# Patient Record
Sex: FEMALE | Race: WHITE | HISPANIC OR LATINO | Employment: UNEMPLOYED | URBAN - METROPOLITAN AREA
[De-identification: names, ages, dates, MRNs, and addresses within clinical notes are randomized per-mention and may not be internally consistent; named-entity substitution may affect disease eponyms.]

---

## 2017-04-24 ENCOUNTER — GENERIC CONVERSION - ENCOUNTER (OUTPATIENT)
Dept: OTHER | Facility: OTHER | Age: 67
End: 2017-04-24

## 2017-04-24 ENCOUNTER — APPOINTMENT (OUTPATIENT)
Dept: LAB | Facility: HOSPITAL | Age: 67
End: 2017-04-24
Payer: MEDICARE

## 2017-04-24 ENCOUNTER — ALLSCRIPTS OFFICE VISIT (OUTPATIENT)
Dept: OTHER | Facility: OTHER | Age: 67
End: 2017-04-24

## 2017-04-24 ENCOUNTER — TRANSCRIBE ORDERS (OUTPATIENT)
Dept: LAB | Facility: HOSPITAL | Age: 67
End: 2017-04-24
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
## 2017-04-24 DIAGNOSIS — R42 DIZZINESS AND GIDDINESS: ICD-10-CM

## 2017-04-24 DIAGNOSIS — D72.828 OTHER ELEVATED WHITE BLOOD CELL COUNT: ICD-10-CM

## 2017-04-24 LAB
ALBUMIN SERPL BCP-MCNC: 3.7 G/DL (ref 3.5–5)
ALP SERPL-CCNC: 118 U/L (ref 46–116)
ALT SERPL W P-5'-P-CCNC: 25 U/L (ref 12–78)
ANION GAP SERPL CALCULATED.3IONS-SCNC: 6 MMOL/L (ref 4–13)
AST SERPL W P-5'-P-CCNC: 14 U/L (ref 5–45)
BASOPHILS # BLD AUTO: 0.03 THOUSANDS/ΜL (ref 0–0.1)
BASOPHILS NFR BLD AUTO: 0 % (ref 0–1)
BILIRUB SERPL-MCNC: 0.67 MG/DL (ref 0.2–1)
BUN SERPL-MCNC: 16 MG/DL (ref 5–25)
CALCIUM SERPL-MCNC: 9.7 MG/DL (ref 8.3–10.1)
CHLORIDE SERPL-SCNC: 104 MMOL/L (ref 100–108)
CO2 SERPL-SCNC: 31 MMOL/L (ref 21–32)
CREAT SERPL-MCNC: 1.26 MG/DL (ref 0.6–1.3)
EOSINOPHIL # BLD AUTO: 0.26 THOUSAND/ΜL (ref 0–0.61)
EOSINOPHIL NFR BLD AUTO: 2 % (ref 0–6)
ERYTHROCYTE [DISTWIDTH] IN BLOOD BY AUTOMATED COUNT: 12.8 % (ref 11.6–15.1)
GFR SERPL CREATININE-BSD FRML MDRD: 42.6 ML/MIN/1.73SQ M
GLUCOSE SERPL-MCNC: 92 MG/DL (ref 65–140)
HBA1C MFR BLD HPLC: 5.4 %
HCT VFR BLD AUTO: 42.1 % (ref 34.8–46.1)
HGB BLD-MCNC: 13.6 G/DL (ref 11.5–15.4)
LYMPHOCYTES # BLD AUTO: 2.63 THOUSANDS/ΜL (ref 0.6–4.47)
LYMPHOCYTES NFR BLD AUTO: 22 % (ref 14–44)
MCH RBC QN AUTO: 30 PG (ref 26.8–34.3)
MCHC RBC AUTO-ENTMCNC: 32.3 G/DL (ref 31.4–37.4)
MCV RBC AUTO: 93 FL (ref 82–98)
MONOCYTES # BLD AUTO: 0.55 THOUSAND/ΜL (ref 0.17–1.22)
MONOCYTES NFR BLD AUTO: 5 % (ref 4–12)
NEUTROPHILS # BLD AUTO: 8.24 THOUSANDS/ΜL (ref 1.85–7.62)
NEUTS SEG NFR BLD AUTO: 71 % (ref 43–75)
NRBC BLD AUTO-RTO: 0 /100 WBCS
PLATELET # BLD AUTO: 295 THOUSANDS/UL (ref 149–390)
PMV BLD AUTO: 12.7 FL (ref 8.9–12.7)
POTASSIUM SERPL-SCNC: 4.3 MMOL/L (ref 3.5–5.3)
PROT SERPL-MCNC: 8.3 G/DL (ref 6.4–8.2)
RBC # BLD AUTO: 4.53 MILLION/UL (ref 3.81–5.12)
SODIUM SERPL-SCNC: 141 MMOL/L (ref 136–145)
WBC # BLD AUTO: 11.75 THOUSAND/UL (ref 4.31–10.16)

## 2017-04-24 PROCEDURE — 36415 COLL VENOUS BLD VENIPUNCTURE: CPT

## 2017-04-24 PROCEDURE — 85025 COMPLETE CBC W/AUTO DIFF WBC: CPT

## 2017-04-24 PROCEDURE — 80053 COMPREHEN METABOLIC PANEL: CPT

## 2017-04-25 ENCOUNTER — GENERIC CONVERSION - ENCOUNTER (OUTPATIENT)
Dept: OTHER | Facility: OTHER | Age: 67
End: 2017-04-25

## 2017-05-30 ENCOUNTER — APPOINTMENT (OUTPATIENT)
Dept: LAB | Facility: CLINIC | Age: 67
End: 2017-05-30
Payer: MEDICARE

## 2017-05-30 DIAGNOSIS — D72.828 OTHER ELEVATED WHITE BLOOD CELL COUNT: ICD-10-CM

## 2017-05-30 LAB
ERYTHROCYTE [DISTWIDTH] IN BLOOD BY AUTOMATED COUNT: 13 % (ref 11.6–15.1)
HCT VFR BLD AUTO: 41.1 % (ref 34.8–46.1)
HGB BLD-MCNC: 12.8 G/DL (ref 11.5–15.4)
MCH RBC QN AUTO: 29.6 PG (ref 26.8–34.3)
MCHC RBC AUTO-ENTMCNC: 31.1 G/DL (ref 31.4–37.4)
MCV RBC AUTO: 95 FL (ref 82–98)
PLATELET # BLD AUTO: 289 THOUSANDS/UL (ref 149–390)
PMV BLD AUTO: 12.6 FL (ref 8.9–12.7)
RBC # BLD AUTO: 4.33 MILLION/UL (ref 3.81–5.12)
WBC # BLD AUTO: 8.43 THOUSAND/UL (ref 4.31–10.16)

## 2017-05-30 PROCEDURE — 36415 COLL VENOUS BLD VENIPUNCTURE: CPT

## 2017-05-30 PROCEDURE — 85027 COMPLETE CBC AUTOMATED: CPT

## 2017-06-02 ENCOUNTER — ALLSCRIPTS OFFICE VISIT (OUTPATIENT)
Dept: OTHER | Facility: OTHER | Age: 67
End: 2017-06-02

## 2017-06-02 DIAGNOSIS — Z78.0 ASYMPTOMATIC MENOPAUSAL STATE: ICD-10-CM

## 2017-06-02 DIAGNOSIS — Z12.31 ENCOUNTER FOR SCREENING MAMMOGRAM FOR MALIGNANT NEOPLASM OF BREAST: ICD-10-CM

## 2018-01-10 NOTE — RESULT NOTES
Verified Results  (1) CBC/PLT/DIFF 24Apr2017 04:59PM Bertrand Skiff Order Number: [id number]     Test Name Result Flag Reference   WBC COUNT 11 75 Thousand/uL H 4 31-10 16   RBC COUNT 4 53 Million/uL  3 81-5 12   HEMOGLOBIN 13 6 g/dL  11 5-15 4   HEMATOCRIT 42 1 %  34 8-46  1   MCV 93 fL  82-98   MCH 30 0 pg  26 8-34 3   MCHC 32 3 g/dL  31 4-37 4   RDW 12 8 %  11 6-15 1   MPV 12 7 fL  8 9-12 7   PLATELET COUNT 484 Thousands/uL  149-390   nRBC AUTOMATED 0 /100 WBCs     NEUTROPHILS RELATIVE PERCENT 71 %  43-75   LYMPHOCYTES RELATIVE PERCENT 22 %  14-44   MONOCYTES RELATIVE PERCENT 5 %  4-12   EOSINOPHILS RELATIVE PERCENT 2 %  0-6   BASOPHILS RELATIVE PERCENT 0 %  0-1   NEUTROPHILS ABSOLUTE COUNT 8 24 Thousands/? ??L H 1 85-7 62   LYMPHOCYTES ABSOLUTE COUNT 2 63 Thousands/? ??L  0 60-4 47   MONOCYTES ABSOLUTE COUNT 0 55 Thousand/? ??L  0 17-1 22   EOSINOPHILS ABSOLUTE COUNT 0 26 Thousand/? ??L  0 00-0 61   BASOPHILS ABSOLUTE COUNT 0 03 Thousands/? ??L  0 00-0 10       Plan  Other elevated white blood cell (WBC) count    · (1) CBC/ PLT (NO DIFF); Status:Active;  Requested for:25Apr2017;

## 2018-01-11 NOTE — PROGRESS NOTES
Chief Complaint  Patient here for A1C testing      Active Problems    1  Acute ischemic stroke (434 91) (I63 9)   2  Asthma (493 90) (J45 909)   3  Chronic migraine (346 70) (G43 709)   4  Diabetes mellitus type 2, noninsulin dependent (250 00) (E11 9)   5  Encounter for cervical Pap smear with pelvic exam (V76 2,V72 31) (Z01 419)   6  Encounter for screening colonoscopy (V76 51) (Z12 11)   7  Encounter for screening mammogram for malignant neoplasm of breast (V76 12)   (Z12 31)   8  Hernia, abdominal (553 9) (K46 9)   9  Hyperlipidemia (272 4) (E78 5)   10  Hypertension (401 9) (I10)   11  Need for pneumococcal vaccine (V03 82) (Z23)    Current Meds   1  Accu-Chek Petra Device; USE AS DIRECTED; Therapy: 21JSN5982 to (Last PW:76ABK6922)  Requested for: 27WVT9480 Ordered   2  Accu-Chek Petra In Vitro Strip; use 1-2 test strips every day for glucose checks; Therapy: 77NPH0322 to (Last Rx:99Ibm6914)  Requested for: 08RLA1509 Ordered   3  Accu-Chek Petra Plus w/Device Kit; Therapy: 43PBQ1435 to Recorded   4  Atorvastatin Calcium 40 MG Oral Tablet; TAKE 1 TABLET DAILY AS DIRECTED; Therapy: 83PBC8993 to (Deo Milligan)  Requested for: 11VMI9307; Last   Rx:13Oct2015 Ordered   5  Clopidogrel Bisulfate 75 MG Oral Tablet; take 1 tablet by mouth once daily; Therapy: 28ZSS7172 to (Evaluate:62Dvt4745)  Requested for: 45Wwj1112; Last   Rx:18Ghx1195 Ordered   6  Folic Acid 1 MG Oral Tablet; TAKE 1 TABLET DAILY; Therapy: 62DTP5664 to (Deo Milligan)  Requested for: 79LFS2812; Last   Rx:13Oct2015 Ordered   7  Lancets Miscellaneous; Use to obtain blood sample twice a day to monitor your blood   sugar; Therapy: 63ITD9846 to (Last Rx:56Nzn9471)  Requested for: 14CYI8231 Ordered   8  Lisinopril 10 MG Oral Tablet; TAKE 1 TABLET DAILY; Therapy: 61YYM7491 to (Chris Cabrales)  Requested for: 16HNW4322; Last   TC:61HHF4202 Ordered   9   MetFORMIN HCl  MG Oral Tablet Extended Release 24 Hour; take 1 tablet by   mouth daily  WITH THE EVENING MEAL; Therapy: 04UXA0972 to (Evaluate:01Jun2017)  Requested for: 97UTS6320; Last   Rx:06Jun2016 Ordered   10  Symbicort 80-4 5 MCG/ACT Inhalation Aerosol; Therapy: 43OLQ6548 to Recorded   11  Ventolin  (90 Base) MCG/ACT Inhalation Aerosol Solution; Therapy: 08WRD7916 to Recorded    Allergies    1   No Known Drug Allergies    Results/Data  Hemoglobin A1c- POC 78GLI1852 04:13PM [facility]     Test Name Result Flag Reference   HEMOGLOBIN A1C 5 5         Plan  Diabetes mellitus type 2, noninsulin dependent    · Hemoglobin A1c- POC; Status:Complete;   Done: 59IWQ4327 04:13PM    Future Appointments    Date/Time Provider Specialty Site   09/22/2016 03:20 PM Yamila Ruiz DO Internal Medicine  [facility] PCP   08/26/2016 01:40 PM Specialty Clinic, [address] [place] [address]     Signatures   Electronically signed by : Sonali Juárez, ; Jul 14 2016  4:08PM EST                       (Author)    Electronically signed by : ROMAINE Lynch ; Jul 14 2016  4:23PM EST                       (Co-author)

## 2018-01-14 VITALS
BODY MASS INDEX: 33.05 KG/M2 | DIASTOLIC BLOOD PRESSURE: 88 MMHG | TEMPERATURE: 98.4 F | WEIGHT: 175.04 LBS | HEART RATE: 78 BPM | HEIGHT: 61 IN | SYSTOLIC BLOOD PRESSURE: 150 MMHG

## 2018-01-22 VITALS
WEIGHT: 179.01 LBS | HEIGHT: 60 IN | DIASTOLIC BLOOD PRESSURE: 82 MMHG | HEART RATE: 82 BPM | SYSTOLIC BLOOD PRESSURE: 140 MMHG | BODY MASS INDEX: 35.15 KG/M2 | TEMPERATURE: 98.7 F

## 2018-01-23 NOTE — MISCELLANEOUS
Reason For Visit  Reason For Visit Free Text Note Form: Assistance with community resources   Case Management Documentation [facility]:   Information obtained from the patient, family member(s) and medical record Miranda Baker [phone]  The patient is receiving food stamps assistance  Other needs and concerns include safety  Patient's financial status inadequate medical insurance  She is also dealing with additional issues such as language/communication barrier  Patient is participating in [facility] [place]  Action Plan: information provided and PA Waiver/ MOWs/HHC if MD moran  plan reviewed  Progress Note  SW met with pt as per Provider request to see this 72 y/o  Bhutanese Speaking female pt who is s/p a fall in her bathtub this date  Pt has suffered a stroke last Fall  She resides with he disabled  is s first floor apt  Pt uses a can and has a walker as well  As per Provider pt is also not eating enough  SW has explained Taty Bruno and PA Waiver Program services as well as MOWs  Pt agrees to all three  Referral for the PA waiver has been faxed in this date  Request fro [employer] also called intis date  When MD order received with also make referral to [facility] as per pt approval and request  Pt also referred to [facility] our Financial Counselor for [employer] after Medicare  Sw to remain available to assist as  indicated  ADDENDUM:  Referral for [facility] received and faxed in on 5/1/17 1         1 Amended By: Lynne Pena; May 01 2017 4:38 PM EST    Active Problems   1  Acute ischemic stroke (434 91) (I63 9)  2  Asthma (493 90) (J45 909)  3  Chronic migraine (346 70) (G43 709)  4  Diabetes mellitus type 2, noninsulin dependent (250 00) (E11 9)  5  Dizziness (780 4) (R42)  6  Encounter for cervical Pap smear with pelvic exam (V76 2,V72 31) (Z01 419)  7  Encounter for screening colonoscopy (V76 51) (Z12 11)  8   Encounter for screening mammogram for malignant neoplasm of breast (V76 12)   (Z12 31)  9  Fall in home, initial encounter (C882 5,Q958 8) ([address]  FFFO,Y00 936)  10  Hernia, abdominal (553 9) (K46 9)  11  Hyperlipidemia (272 4) (E78 5)  12  Hypertension (401 9) (I10)  13  Need for pneumococcal vaccine (V03 82) (Z23)    Current Meds  1  Accu-Chek Petra Device; USE AS DIRECTED; Therapy: 94HLG7575 to (Last TE:59XPT5354)  Requested for: 79DJD0176 Ordered  2  Accu-Chek Petra Plus w/Device Kit; Therapy: 99TXL4566 to Recorded  3  Accu-Chek Petra STRP; use 1-2 test strips every day for glucose checks; Therapy: 48BQW1588 to (Last Rx:65Kze5826)  Requested for: 85KST4594 Ordered  4  Atorvastatin Calcium 40 MG Oral Tablet; TAKE 1 TABLET DAILY AS DIRECTED; Therapy: 42UPA8209 to (Evaluate:68Wfn3577)  Requested for: 88Fac5587; Last   Rx:37Bfl4027 Ordered  5  Clopidogrel Bisulfate 75 MG Oral Tablet (Plavix); take 1 tablet by mouth once daily; Therapy: 37ZSS8231 to (Evaluate:17Mar2017)  Requested for: 05VPF6098; Last   Rx:28Wwq1566 Ordered  6  Folic Acid 1 MG Oral Tablet; TAKE 1 TABLET DAILY; Therapy: 74RUN7010 to (Evaluate:13Oct2017)  Requested for: 45PBX9897; Last   Rx:18Oct2016 Ordered  7  Lancets Miscellaneous; Use to obtain blood sample twice a day to monitor your blood   sugar; Therapy: 43UXT7419 to (Last Rx:34Pnk6173)  Requested for: 27JWO2493 Ordered  8  Lisinopril 10 MG Oral Tablet; TAKE 1 TABLET DAILY; Therapy: 18VIU0165 to (Tacy Heidrick)  Requested for: 99SBK4411; Last   VX:25RMH4880 Ordered  9  MetFORMIN HCl  MG Oral Tablet Extended Release 24 Hour; take 1 tablet by   mouth daily  WITH THE EVENING MEAL; Therapy: 31VSK3147 to (Evaluate:01Jun2017)  Requested for: 29CHA8330; Last   Rx:06Jun2016 Ordered  10  Symbicort 80-4 5 MCG/ACT Inhalation Aerosol; Therapy: 40WOJ0220 to Recorded  11  Ventolin  (90 Base) MCG/ACT Inhalation Aerosol Solution; Therapy: 81DQW4438 to Recorded    Allergies   1   No Known Drug Allergies    Future Appointments    Date/Time Provider Specialty Site   05/10/2017 10:20 AM Rosana Whitten 6 PCP     Signatures   Electronically signed by : Carmen Dutta LCSW; Apr 24 2017  5:21PM EST                       (Author)    Electronically signed by : Carmen Dutta LCSW; May  1 2017  4:39PM EST                       (Author)

## 2018-01-30 RX ORDER — BUTALBITAL, ACETAMINOPHEN AND CAFFEINE 50; 325; 40 MG/1; MG/1; MG/1
1 TABLET ORAL EVERY 4 HOURS
COMMUNITY
Start: 2015-10-17 | End: 2022-01-30 | Stop reason: HOSPADM

## 2018-01-30 RX ORDER — LISINOPRIL 10 MG/1
10 TABLET ORAL
COMMUNITY
Start: 2015-10-17

## 2018-01-30 RX ORDER — ATORVASTATIN CALCIUM 40 MG/1
40 TABLET, FILM COATED ORAL
COMMUNITY
Start: 2015-10-17

## 2018-01-30 RX ORDER — ATORVASTATIN CALCIUM 40 MG/1
40 TABLET, FILM COATED ORAL
Refills: 0 | OUTPATIENT
Start: 2018-01-30

## 2018-01-30 RX ORDER — METFORMIN HYDROCHLORIDE 750 MG/1
750 TABLET, EXTENDED RELEASE ORAL
COMMUNITY

## 2018-01-30 RX ORDER — LANCETS 30 GAUGE
1 EACH MISCELLANEOUS DAILY
COMMUNITY
Start: 2015-07-01

## 2018-01-30 RX ORDER — ATORVASTATIN CALCIUM 40 MG/1
TABLET, FILM COATED ORAL
Qty: 90 TABLET | Refills: 0 | OUTPATIENT
Start: 2018-01-30

## 2018-01-30 RX ORDER — FOLIC ACID 1 MG/1
1 TABLET ORAL
COMMUNITY

## 2018-01-30 RX ORDER — CLOPIDOGREL BISULFATE 75 MG/1
75 TABLET ORAL
COMMUNITY
Start: 2015-10-17 | End: 2018-01-30

## 2018-01-30 RX ORDER — ASPIRIN 325 MG
325 TABLET, DELAYED RELEASE (ENTERIC COATED) ORAL
COMMUNITY
End: 2018-01-30

## 2018-01-30 RX ORDER — BUDESONIDE AND FORMOTEROL FUMARATE DIHYDRATE 80; 4.5 UG/1; UG/1
1 AEROSOL RESPIRATORY (INHALATION) 2 TIMES DAILY
COMMUNITY
Start: 2016-01-08

## 2018-01-30 RX ORDER — ATORVASTATIN CALCIUM 40 MG/1
1 TABLET, FILM COATED ORAL DAILY
COMMUNITY
Start: 2015-06-15 | End: 2018-01-30

## 2018-01-30 RX ORDER — ALBUTEROL SULFATE 90 UG/1
2 AEROSOL, METERED RESPIRATORY (INHALATION) 2 TIMES DAILY
COMMUNITY
Start: 2015-06-15

## 2018-01-30 NOTE — TELEPHONE ENCOUNTER
Please advise patient to make an apt  No med refills  She past due for her six month apt  Thanks   Cyrus Grimaldo

## 2018-01-31 NOTE — TELEPHONE ENCOUNTER
Called pt to schedule appt  She stated she will call back to schedule appt once she coordinate a ride

## 2018-06-04 RX ORDER — METFORMIN HYDROCHLORIDE 750 MG/1
TABLET, EXTENDED RELEASE ORAL
Qty: 90 TABLET | Refills: 3 | OUTPATIENT
Start: 2018-06-04

## 2018-06-27 ENCOUNTER — TELEPHONE (OUTPATIENT)
Dept: INTERNAL MEDICINE CLINIC | Facility: CLINIC | Age: 68
End: 2018-06-27

## 2022-01-29 ENCOUNTER — HOSPITAL ENCOUNTER (OUTPATIENT)
Facility: HOSPITAL | Age: 72
Setting detail: OBSERVATION
Discharge: HOME | End: 2022-01-30
Attending: EMERGENCY MEDICINE | Admitting: PSYCHIATRY & NEUROLOGY
Payer: COMMERCIAL

## 2022-01-29 ENCOUNTER — APPOINTMENT (EMERGENCY)
Dept: RADIOLOGY | Facility: HOSPITAL | Age: 72
End: 2022-01-29
Payer: COMMERCIAL

## 2022-01-29 ENCOUNTER — APPOINTMENT (OUTPATIENT)
Dept: RADIOLOGY | Facility: HOSPITAL | Age: 72
End: 2022-01-29
Payer: COMMERCIAL

## 2022-01-29 DIAGNOSIS — G45.9 TIA (TRANSIENT ISCHEMIC ATTACK): ICD-10-CM

## 2022-01-29 DIAGNOSIS — I63.9 STROKE (HCC): Primary | ICD-10-CM

## 2022-01-29 DIAGNOSIS — I63.9 DYSARTHRIA DUE TO ACUTE STROKE (HCC): ICD-10-CM

## 2022-01-29 DIAGNOSIS — Z86.73 HISTORY OF CVA (CEREBROVASCULAR ACCIDENT): ICD-10-CM

## 2022-01-29 DIAGNOSIS — R47.1 DYSARTHRIA: ICD-10-CM

## 2022-01-29 DIAGNOSIS — R47.1 DYSARTHRIA DUE TO ACUTE STROKE (HCC): ICD-10-CM

## 2022-01-29 PROBLEM — R29.90 STROKE-LIKE SYMPTOMS: Status: ACTIVE | Noted: 2022-01-29

## 2022-01-29 PROBLEM — J45.20 MILD INTERMITTENT ASTHMA WITHOUT COMPLICATION: Status: ACTIVE | Noted: 2017-06-02

## 2022-01-29 PROBLEM — I10 PRIMARY HYPERTENSION: Status: ACTIVE | Noted: 2022-01-29

## 2022-01-29 LAB
ANION GAP SERPL CALCULATED.3IONS-SCNC: 3 MMOL/L (ref 4–13)
APTT PPP: 32 SECONDS (ref 23–37)
BACTERIA UR QL AUTO: ABNORMAL /HPF
BILIRUB UR QL STRIP: NEGATIVE
BUN SERPL-MCNC: 14 MG/DL (ref 5–25)
CALCIUM SERPL-MCNC: 9.1 MG/DL (ref 8.3–10.1)
CARDIAC TROPONIN I PNL SERPL HS: 3 NG/L
CHLORIDE SERPL-SCNC: 108 MMOL/L (ref 100–108)
CLARITY UR: CLEAR
CO2 SERPL-SCNC: 29 MMOL/L (ref 21–32)
COLOR UR: YELLOW
CREAT SERPL-MCNC: 1.19 MG/DL (ref 0.6–1.3)
ERYTHROCYTE [DISTWIDTH] IN BLOOD BY AUTOMATED COUNT: 13.4 % (ref 11.6–15.1)
FLUAV RNA RESP QL NAA+PROBE: NEGATIVE
FLUBV RNA RESP QL NAA+PROBE: NEGATIVE
GFR SERPL CREATININE-BSD FRML MDRD: 46 ML/MIN/1.73SQ M
GLUCOSE SERPL-MCNC: 130 MG/DL (ref 65–140)
GLUCOSE SERPL-MCNC: 196 MG/DL (ref 65–140)
GLUCOSE SERPL-MCNC: 203 MG/DL (ref 65–140)
GLUCOSE UR STRIP-MCNC: NEGATIVE MG/DL
HCT VFR BLD AUTO: 43.1 % (ref 34.8–46.1)
HGB BLD-MCNC: 13 G/DL (ref 11.5–15.4)
HGB UR QL STRIP.AUTO: ABNORMAL
HYALINE CASTS #/AREA URNS LPF: ABNORMAL /LPF
INR PPP: 1.03 (ref 0.84–1.19)
KETONES UR STRIP-MCNC: NEGATIVE MG/DL
LEUKOCYTE ESTERASE UR QL STRIP: NEGATIVE
MCH RBC QN AUTO: 29.6 PG (ref 26.8–34.3)
MCHC RBC AUTO-ENTMCNC: 30.2 G/DL (ref 31.4–37.4)
MCV RBC AUTO: 98 FL (ref 82–98)
NITRITE UR QL STRIP: NEGATIVE
NON-SQ EPI CELLS URNS QL MICRO: ABNORMAL /HPF
PA ADP BLD-ACNC: 268 PRU (ref 194–418)
PH UR STRIP.AUTO: 6 [PH] (ref 4.5–8)
PLATELET # BLD AUTO: 231 THOUSANDS/UL (ref 149–390)
PLATELET # BLD AUTO: 292 THOUSANDS/UL (ref 149–390)
PMV BLD AUTO: 12.5 FL (ref 8.9–12.7)
PMV BLD AUTO: 12.6 FL (ref 8.9–12.7)
POTASSIUM SERPL-SCNC: 4.2 MMOL/L (ref 3.5–5.3)
PROT UR STRIP-MCNC: NEGATIVE MG/DL
PROTHROMBIN TIME: 13.1 SECONDS (ref 11.6–14.5)
RBC # BLD AUTO: 4.39 MILLION/UL (ref 3.81–5.12)
RBC #/AREA URNS AUTO: ABNORMAL /HPF
RSV RNA RESP QL NAA+PROBE: NEGATIVE
SARS-COV-2 RNA RESP QL NAA+PROBE: NEGATIVE
SODIUM SERPL-SCNC: 140 MMOL/L (ref 136–145)
SP GR UR STRIP.AUTO: 1.01 (ref 1–1.03)
UROBILINOGEN UR QL STRIP.AUTO: 0.2 E.U./DL
WBC # BLD AUTO: 10.88 THOUSAND/UL (ref 4.31–10.16)
WBC #/AREA URNS AUTO: ABNORMAL /HPF

## 2022-01-29 PROCEDURE — 82948 REAGENT STRIP/BLOOD GLUCOSE: CPT

## 2022-01-29 PROCEDURE — 85730 THROMBOPLASTIN TIME PARTIAL: CPT

## 2022-01-29 PROCEDURE — 85049 AUTOMATED PLATELET COUNT: CPT | Performed by: PSYCHIATRY & NEUROLOGY

## 2022-01-29 PROCEDURE — 0241U HB NFCT DS VIR RESP RNA 4 TRGT: CPT

## 2022-01-29 PROCEDURE — 36415 COLL VENOUS BLD VENIPUNCTURE: CPT

## 2022-01-29 PROCEDURE — 84484 ASSAY OF TROPONIN QUANT: CPT

## 2022-01-29 PROCEDURE — 85576 BLOOD PLATELET AGGREGATION: CPT | Performed by: PSYCHIATRY & NEUROLOGY

## 2022-01-29 PROCEDURE — 93005 ELECTROCARDIOGRAM TRACING: CPT

## 2022-01-29 PROCEDURE — G1004 CDSM NDSC: HCPCS

## 2022-01-29 PROCEDURE — 70498 CT ANGIOGRAPHY NECK: CPT

## 2022-01-29 PROCEDURE — 99223 1ST HOSP IP/OBS HIGH 75: CPT | Performed by: PSYCHIATRY & NEUROLOGY

## 2022-01-29 PROCEDURE — NC001 PR NO CHARGE: Performed by: PSYCHIATRY & NEUROLOGY

## 2022-01-29 PROCEDURE — 85027 COMPLETE CBC AUTOMATED: CPT

## 2022-01-29 PROCEDURE — 70496 CT ANGIOGRAPHY HEAD: CPT

## 2022-01-29 PROCEDURE — 80048 BASIC METABOLIC PNL TOTAL CA: CPT

## 2022-01-29 PROCEDURE — 85610 PROTHROMBIN TIME: CPT

## 2022-01-29 PROCEDURE — 99285 EMERGENCY DEPT VISIT HI MDM: CPT

## 2022-01-29 PROCEDURE — 99285 EMERGENCY DEPT VISIT HI MDM: CPT | Performed by: EMERGENCY MEDICINE

## 2022-01-29 PROCEDURE — 81001 URINALYSIS AUTO W/SCOPE: CPT

## 2022-01-29 PROCEDURE — 87086 URINE CULTURE/COLONY COUNT: CPT

## 2022-01-29 PROCEDURE — 70551 MRI BRAIN STEM W/O DYE: CPT

## 2022-01-29 PROCEDURE — 71045 X-RAY EXAM CHEST 1 VIEW: CPT

## 2022-01-29 RX ORDER — LISINOPRIL 10 MG/1
10 TABLET ORAL DAILY
Status: DISCONTINUED | OUTPATIENT
Start: 2022-01-30 | End: 2022-01-29

## 2022-01-29 RX ORDER — METFORMIN HYDROCHLORIDE 750 MG/1
750 TABLET, EXTENDED RELEASE ORAL
Status: DISCONTINUED | OUTPATIENT
Start: 2022-01-30 | End: 2022-01-29

## 2022-01-29 RX ORDER — ATORVASTATIN CALCIUM 40 MG/1
40 TABLET, FILM COATED ORAL EVERY EVENING
Status: DISCONTINUED | OUTPATIENT
Start: 2022-01-30 | End: 2022-01-30 | Stop reason: HOSPADM

## 2022-01-29 RX ORDER — FOLIC ACID 1 MG/1
1 TABLET ORAL DAILY
Status: DISCONTINUED | OUTPATIENT
Start: 2022-01-30 | End: 2022-01-30 | Stop reason: HOSPADM

## 2022-01-29 RX ORDER — BUDESONIDE AND FORMOTEROL FUMARATE DIHYDRATE 80; 4.5 UG/1; UG/1
1 AEROSOL RESPIRATORY (INHALATION) 2 TIMES DAILY
Status: DISCONTINUED | OUTPATIENT
Start: 2022-01-29 | End: 2022-01-30 | Stop reason: HOSPADM

## 2022-01-29 RX ORDER — ATORVASTATIN CALCIUM 20 MG/1
40 TABLET, FILM COATED ORAL EVERY EVENING
Status: DISCONTINUED | OUTPATIENT
Start: 2022-01-29 | End: 2022-01-29

## 2022-01-29 RX ORDER — ALBUTEROL SULFATE 90 UG/1
2 AEROSOL, METERED RESPIRATORY (INHALATION) EVERY 6 HOURS PRN
Status: DISCONTINUED | OUTPATIENT
Start: 2022-01-29 | End: 2022-01-30 | Stop reason: HOSPADM

## 2022-01-29 RX ORDER — ATORVASTATIN CALCIUM 20 MG/1
80 TABLET, FILM COATED ORAL EVERY EVENING
Status: DISCONTINUED | OUTPATIENT
Start: 2022-01-30 | End: 2022-01-29

## 2022-01-29 RX ORDER — ASPIRIN 81 MG/1
81 TABLET, CHEWABLE ORAL DAILY
Status: DISCONTINUED | OUTPATIENT
Start: 2022-01-30 | End: 2022-01-30 | Stop reason: HOSPADM

## 2022-01-29 RX ADMIN — IOHEXOL 85 ML: 350 INJECTION, SOLUTION INTRAVENOUS at 14:32

## 2022-01-29 RX ADMIN — TICAGRELOR 180 MG: 90 TABLET ORAL at 17:22

## 2022-01-29 RX ADMIN — BUDESONIDE AND FORMOTEROL FUMARATE DIHYDRATE 1 PUFF: 80; 4.5 AEROSOL RESPIRATORY (INHALATION) at 23:48

## 2022-01-29 NOTE — CONSULTS
[address] 80 y o  female MRN: [id number]  Unit/Bed#: ED 03 Encounter: [id number]    Code Status: Level 3 - DNAR and DNI  POA:      Reason for Admission / Principal Problem: stroke symptoms; workup  Reason for Consult: management of underlying medical problem     Impression:  Patient Active Problem List   Diagnosis    History of CVA (cerebrovascular accident)    Left-sided weakness    Mild intermittent asthma without complication    Morbid obesity [facility] [address] )    Vertigo    Diabetes mellitus type 2, noninsulin dependent ([facility] [address] )    Chronic migraine    Primary hypertension    Stroke-like symptoms       A/P:    Stroke like symptoms  DDX include: Ischemic infarct vs TIA vs recrudescence of prior CVA  Hx of prior CVA with residual left sided upper and lower extremity weakness 4/5 strength   Compliant with home ASA and plavix daily at home   -not tPA candidate; NIH score 3  -admitted to Neurology primary service with Medicine consult  -CT w o contrast and CTA H/N negative   Plavix switched to Brilinta; give 1x load 180mg and continued with 90mg BID;  will need price check for this   -cont ASA 81 mg daily  -increased atorvastatin from 40mg to 80mg daily; follow up lipid panel  -obtain EKG   -Echo  -tele  -Brain MRI  -Neuro Checks  -STAT CT with change in neurologic exam/ mental status   -SBP goal: 160-180; allow for permissive HTN  -speech eval  -PT/OT  -Fall precautions; uses cane to ambulate    Essential HTN  Home meds: Lisinopril 10mg daily  BP on presentation ranging -180  Permit permissive HTN with -180  Holding lisinopril to allow for this   Can resume after 24hrs    HLD  Home atorva 40mg daily increased to 80mg daily   Check lipid panel     T2 Diabetes Mellitus; non-insulin dependent   Home Metformin held while in pt  Check A1c  Consider correctional SSI if hyperglycemia occurs    Morbid Obesity  Follow up OP PCP  Wt loss encouraged w life style modifications    Mild intermittent Asthma  Not in acute exacerbation   Cont home inhalers: Symbicort and albuterol PRN     DVT PPX:  Lovenox 40mg SC daily       HPI: Tyree Henning is a 80 y o  female w sig PMH of prior CVA with residual weakness 4/5 in  LUE/LLE (compliant with daily ASA and plavix ), HTN, HLD, non-insulin dependent T2 DM, migraines, vertigo, obesity who presents to [facility] ED with dysarthria and mild aphasia which started this morning on 1/29/22 at 9am  Last well known was last night prior to bed at 9pm  Pt's  noted pt to have dysarthria on awakening  Pt states her only symptoms included numbness and tingling in left side of face and more pronounce in left jaw/ face area  Denies any weakness/ numbness or tingling in any other body part  Denies falls  Denies HA, fevers, chills, SOB, CP, palpitations, visual disturbance, gait instability  Pt admitted to Neurology service  Internal medicine consulted for assistance of management of underlying chronic medical conditions  History obtained from pt and pt's daughter at bedside who helped with Tunisian translation  At time of examine, pt resting in bed comfortably  NAD  Admits to numbness/ tingling in left side of face  Daughter states that pt still has some slurred speech  On exam, pt has LUE/LLE weakness 4/5 strength which is pt's baseline  Pt is vaccinated for COVID-19 X2  Still needs booster  Denies any known allergies  Level 3 DNR DNI Code status  PMH:  Past Medical History:   Diagnosis Date    Diabetes mellitus ([facility] [address] )     Hypertension     Stroke ([facility] [address] )         PSH:  History reviewed  No pertinent surgical history  Allergies: No Known Allergies    Family History: History reviewed  No pertinent family history      Social History:   Social History     Tobacco Use   Smoking Status Never Smoker   Smokeless Tobacco Not on file Social History     Substance and Sexual Activity   Alcohol Use No      Social History     Substance and Sexual Activity   Drug Use No        Home Medications:   Prior to Admission medications    Medication Sig Start Date End Date Taking? Authorizing Provider   albuterol (VENTOLIN HFA) 90 mcg/act inhaler Inhale 2 puffs 2 (two) times a day 6/15/15   Historical Provider, MD   aspirin 325 mg tablet Take 325 mg by mouth    Historical Provider, MD   atorvastatin (LIPITOR) 40 mg tablet Take 40 mg by mouth 10/17/15   Historical Provider, MD   Blood Glucose Monitoring Suppl (ACCU-CHEK YOUSUF CONNECT) w/Device KIT 1 Device by Does not apply route daily 7/1/15   Historical Provider, MD   budesonide-formoterol (SYMBICORT) 80-4 5 MCG/ACT inhaler Inhale 1 puff 2 (two) times a day 1/8/16   Historical Provider, MD alarconbital-acetaminophen-caffeine (ESGIC PLUS) -40 MG per tablet Take 1 tablet by mouth every 4 (four) hours as needed for headaches 11/10/16   Jennifer Lockett MD   butalbital-acetaminophen-caffeine (FIORICET,ESGIC) -40 mg per tablet Take 1 tablet by mouth every 4 (four) hours 10/17/15   Historical Provider, MD   butalbital-acetaminophen-caffeine-codeine (FIORICET WITH CODEINE) -69-30 MG per capsule Take 1 capsule by mouth every 4 (four) hours as needed for headaches    Historical Provider, MD   clopidogrel (PLAVIX) 75 mg tablet Take 75 mg by mouth daily    Historical Provider, MD   folic acid (FOLVITE) 1 mg tablet Take 1 mg by mouth    Historical Provider, MD   glucose blood (ACCU-CHEK YOUSUF PLUS) test strip 1 strip by In Vitro route daily 7/1/15   Historical Provider, MD   Lancets MISC 1 each by Does not apply route daily 7/1/15   Historical Provider, MD   lisinopril (ZESTRIL) 10 mg tablet Take 10 mg by mouth 10/17/15   Historical Provider, MD   metFORMIN (GLUCOPHAGE-XR) 750 mg 24 hr tablet Take 750 mg by mouth    Historical Provider, MD       ROS:   Review of Systems   Constitutional: Negative  Negative for activity change, chills and fever  HENT: Negative  Eyes: Negative  Negative for visual disturbance  Respiratory: Negative  Cardiovascular: Negative  Gastrointestinal: Negative  Genitourinary: Negative  Musculoskeletal: Negative  Neurological: Positive for speech difficulty, weakness and numbness (left side of face)  Negative for dizziness, seizures, syncope, light-headedness and headaches  Dysarthria  Mild aphasia   Psychiatric/Behavioral: Negative  All other systems reviewed and are negative  Vitals:   Vitals:    01/29/22 1445 01/29/22 1500 01/29/22 1530 01/29/22 1600   BP: 139/62 141/61 150/70 160/68   Pulse: 84 86 76 92   Resp: 18 18 18 18   Temp:       SpO2: 97% 97% 98% 99%     Temp  Min: 97 9 °F (36 6 °C)  Max: 97 9 °F (36 6 °C)     There is no height or weight on file to calculate BMI  PHYSICAL EXAM:  Physical Exam  Vitals and nursing note reviewed  Constitutional:       General: She is not in acute distress  Appearance: Normal appearance  She is obese  She is not ill-appearing  HENT:      Head: Normocephalic and atraumatic  Eyes:      General: No scleral icterus  Conjunctiva/sclera: Conjunctivae normal    Cardiovascular:      Rate and Rhythm: Normal rate and regular rhythm  Pulses: Normal pulses  Heart sounds: Normal heart sounds  No murmur heard  Pulmonary:      Effort: Pulmonary effort is normal  No respiratory distress  Breath sounds: Normal breath sounds  Abdominal:      General: Bowel sounds are normal  There is no distension  Palpations: Abdomen is soft  Tenderness: There is no abdominal tenderness  Musculoskeletal:         General: No swelling or tenderness  Normal range of motion  Cervical back: Normal range of motion and neck supple  Skin:     General: Skin is warm and dry  Capillary Refill: Capillary refill takes less than 2 seconds     Neurological:      Mental Status: She is alert and oriented to person, place, and time  Mental status is at baseline  Sensory: No sensory deficit  Motor: Weakness present  Comments: 4/5 LUE/LLE strength     Psychiatric:         Mood and Affect: Mood normal          Behavior: Behavior normal          Thought Content: Thought content normal          Judgment: Judgment normal          Labs:   Results from last 7 days   Lab Units 01/29/22  1428   WBC Thousand/uL 10 88*   HEMOGLOBIN g/dL 13 0   HEMATOCRIT % 43 1   PLATELETS Thousands/uL 292     Results from last 7 days   Lab Units 01/29/22  1428   POTASSIUM mmol/L 4 2   CHLORIDE mmol/L 108   CO2 mmol/L 29   BUN mg/dL 14   CREATININE mg/dL 1 19   CALCIUM mg/dL 9 1         No results found for: PHOS   Results from last 7 days   Lab Units 01/29/22  1428   INR  1 03   PTT seconds 32     No results found for: TROPONINT  ABG:No results found for: PHART, PFM2KON, PO2ART, QOE4CLE, Y5PEJNCE, BEART, SOURCE    Imaging: I have personally reviewed pertinent reports  EKG: This was personally reviewed by myself     Micro: reviewed chart  Blood Culture: No results found for: BLOODCX  Urine Culture: No results found for: URINECX  Sputum Culture: No components found for: SPUTUMCX  Wound Culure: No results found for: WOUNDCULT    VTE Pharmacologic Prophylaxis: Enoxaparin (Lovenox)  VTE Mechanical Prophylaxis: sequential compression device    Fina John MD  1/29/2022 6:52 PM

## 2022-01-29 NOTE — H&P
History and Pyhsical- Stroke   [facility] 80 y o  female MRN: [id number]  Unit/Bed#: ED 03 Encounter: [id number]      Assessment/Plan     * Dysarthria due to acute stroke [facility])  Assessment & Plan  Assessment:  80 y o  right handed female with history of HTN, DM, and multiple prior strokes with residual left sided weakness and numbness and presumed right-sided visual deficit (on chronic DAPT with aspirin and plavix), who presented as a stroke on 1/29/2022 with dysarthria that was first noted when she woke up around 9am, but she did not present to the ED until 2:20pm and was therefore not a candidate for tPA  She was also not a candidate for tPA due to her symptoms being mild and significantly improved since they began  Etiology of dysarthria is uncertain, with the differential including TIA/stroke (not localizable), or recrudescence of prior stroke symptoms, which pt's daughter confirmed pt has had previously  Workup:  CT head wo 1/29/2022:  No acute findings  Numerous chronic strokes  CTA head/neck 1/29/2022: No acute findings, no LVO or critical stenosis, scattered atherosclerosis of varying severity seen throughout the intracranial vessels, including ACAs and MCAs  Plan:  - admit to neurology service with SOD consulting  - stroke pathway  - continue pt's home aspirin 81mg daily  - check P2Y12 and then load with Brilinta 180mg followed by 90mg BID  If P2Y12 is low, could consider changing to pletal instead  - continue home lipitor 40mg daily (consider increasing if LDL >70)  - MRI brain wo, echo, lipid panel, HbA1c  - permissive HTN to 220/110 for 24 hours, monitor on telemetry  - normothermia, euglycemia  - avoid hypotonic fluids  - frequent neurochecks per stroke pathway protocol   Contact neurology and obtain STAT CT head wo and consider CTA head/neck for significant change in exam          History of CVA (cerebrovascular accident)  Assessment & Plan  Pt had 3 strokes in 2015 with residual left-sided weakness and numbness, and right inferior quadrantanopsia  She has been on DAPT with aspirin and plavix ever since  - see above for ongoing plan for stroke prevention    Primary hypertension  Assessment & Plan  - permissive HTN for 24 hours  Holding home lisinopril for now  Diabetes mellitus type 2, noninsulin dependent ([address] )  Assessment & Plan  History of DM2 on metformin   - hold metformin while inpatient and because she received IV contrast  - Fingersticks and sliding scale insulin TIDAC    Recent Labs     01/29/22  1440   POCGLU 196*       Blood Sugar Average: Last 72 hrs:  (P) 196    Mild intermittent asthma without complication  Assessment & Plan  - Symbicort BID  - Albuterol inhaler PRN      TPA Decision: Patient not a TPA candidate  Unclear time of onset outside appropriate time window  and Symptoms resolved/clearly non disabling  Recommendations for outpatient neurological follow up have yet to be determined  History of Present Illness     Reason for Consult / Principal Problem: dysarthria  Hx and PE limited by: English not being the patient's primary language, but she is able to speak English fairly well and her daughter was able to help as well  Patient last known well: last night before bed  Stroke alert called: 2:21pm  Neurology time of arrival: 2:23pm  HPI: Victorino Portillo is a 80 y o  right handed female with history of HTN, DM, and multiple prior strokes (3 known acute strokes in 2015 per available records) with residual left sided weakness and numbness and presumed right-sided visual deficit (on chronic DAPT with aspirin and plavix), who presented as a stroke alert with dysarthria that was first noted when she woke up this morning  Last known normal the night prior before going to bed    Per pt's daughter, pt's  reported that pt was having slurred speech when she first woke up around 9am   He apparently tried to get her to go to the hospital to be evaluated by she refused  Eventually he called their daughter, who when she heard pt's speech, immediately determined she needed to go to the hospital   She did note that pt had more difficulty walking than usual on the way to the car  When the patient was being evaluated in the ED, pt's daughter reported that pt's speech was actually much improved from earlier when she had first heard it, but that is was still slurred, and sounded like a child having trouble annunciating properly when learning how to speak  Review of Systems: As above  10 point ROS completed, and otherwise unremarkable and not pertinent to the HPI  Historical Information   Past Medical History:   Diagnosis Date    Diabetes mellitus ([facility] [address] )     Hypertension     Stroke [facility])      Past Surgical History:   Procedure Laterality Date     SECTION      x2     Social History   Social History     Substance and Sexual Activity   Alcohol Use No     Social History     Substance and Sexual Activity   Drug Use No     E-Cigarette/Vaping     E-Cigarette/Vaping Substances     Social History     Tobacco Use   Smoking Status Never Smoker   Smokeless Tobacco Not on file     Family History:   Family History   Problem Relation Age of Onset    Stroke Father        Review of previous medical records was completed      Meds/Allergies   all current active meds have been reviewed, current meds:   Current Facility-Administered Medications   Medication Dose Route Frequency    albuterol (PROVENTIL HFA,VENTOLIN HFA) inhaler 2 puff  2 puff Inhalation Q6H PRN    [START ON 2022] aspirin chewable tablet 81 mg  81 mg Oral Daily    [START ON 2022] atorvastatin (LIPITOR) tablet 80 mg  80 mg Oral QPM    budesonide-formoterol (SYMBICORT) 80-4 5 MCG/ACT inhaler 1 puff  1 puff Inhalation BID    [START ON 2022] enoxaparin (LOVENOX) subcutaneous injection 40 mg  40 mg Subcutaneous Daily    [START ON ] folic acid (FOLVITE) tablet 1 mg  1 mg Oral Daily  [START ON 2022] ticagrelor (BRILINTA) tablet 90 mg  90 mg Oral Q12H [address]    and PTA meds:   Prior to Admission Medications   Prescriptions Last Dose Informant Patient Reported? Taking? Blood Glucose Monitoring Suppl (ACCU-CHEK YOUSUF CONNECT) w/Device KIT   Yes No   Si Device by Does not apply route daily   Lancets MISC   Yes No   Si each by Does not apply route daily   albuterol (VENTOLIN HFA) 90 mcg/act inhaler   Yes No   Sig: Inhale 2 puffs 2 (two) times a day   aspirin 325 mg tablet   Yes No   Sig: Take 325 mg by mouth   atorvastatin (LIPITOR) 40 mg tablet   Yes No   Sig: Take 40 mg by mouth   budesonide-formoterol (SYMBICORT) 80-4 5 MCG/ACT inhaler   Yes No   Sig: Inhale 1 puff 2 (two) times a day   butalbital-acetaminophen-caffeine (ESGIC PLUS) -40 MG per tablet   No No   Sig: Take 1 tablet by mouth every 4 (four) hours as needed for headaches   butalbital-acetaminophen-caffeine (FIORICET,ESGIC) -40 mg per tablet   Yes No   Sig: Take 1 tablet by mouth every 4 (four) hours   butalbital-acetaminophen-caffeine-codeine (FIORICET WITH CODEINE) -66-30 MG per capsule   Yes No   Sig: Take 1 capsule by mouth every 4 (four) hours as needed for headaches   clopidogrel (PLAVIX) 75 mg tablet   Yes No   Sig: Take 75 mg by mouth daily   folic acid (FOLVITE) 1 mg tablet   Yes No   Sig: Take 1 mg by mouth   glucose blood (ACCU-CHEK YOUSUF PLUS) test strip   Yes No   Si strip by In Vitro route daily   lisinopril (ZESTRIL) 10 mg tablet   Yes No   Sig: Take 10 mg by mouth   metFORMIN (GLUCOPHAGE-XR) 750 mg 24 hr tablet   Yes No   Sig: Take 750 mg by mouth      Facility-Administered Medications: None       No Known Allergies    Objective   Vitals:Blood pressure 160/68, pulse 92, temperature 97 9 °F (36 6 °C), resp  rate 18, SpO2 99 %  ,There is no height or weight on file to calculate BMI  No intake or output data in the 24 hours ending 22 7490    Invasive Devices:    Invasive Devices Report    Peripheral Intravenous Line            Peripheral IV 01/29/22 Left Antecubital <1 day              GENERAL PHYSICAL EXAMINATION   Constitutional: No acute distress  Pleasant, well-groomed  ENMT: Mucous membranes are moist    Cardiovascular: regular rate and rhythm  No edema  Pulmonary: normal respiratory effort  Musculoskeletal: See below  Skin: warm and dry  Psych: See mental status below     NEUROLOGICAL EXAMINATION   Mental Status: Mood and affect normal, alert and oriented to person, place and time  Able to provide some history  Language: Spontaneous & fluent with good comprehension, able to repeat phrases, name objects, and follow complex commands  Cranial Nerves:   II, III: PERRL, right inferior homonymous quadrantinopsia  III, IV, VI: Extraocular movements intact, no nystagmus, no ptosis  V: Sensation to light touch decrease on the left in V1-V3   VII: Symmetrical face and expression  No weakness of orbicularis oculi  VIII: Hearing intact to voice  IX, X: Uvula midline, palate elevates symmetrically, mild dysarthria  XI: 5/5 strength in trapezius & sternocleidomastoid bilaterally  XII: Tongue is midline with symmetric movement  Motor examination: There is no atrophy and tone is mildly increased on the left  Strength 5/5 throughout in the right, 4-/5 left deltoid, 4+/5 left biceps/triceps, 4+/5 left , 4+/5 left hip flexion, 4+/5 left knee flex/ext, 3/5 left dorsiflexion, 5/5 left plantar flexion  No pronator drift  No fasciculations or spontaneous muscle movements are noted  Sensory examination: Sensation is decreased to pinprick throughout on the left  Reflexes: Reflexes are 2+ B/L UEs, 0 right patella, 2+ left patella, 0 B/L ankles  Toes downgoing on the right, mute on the left  Coordination: Finger to nose and heel to shin are intact without dysmetria  There is no tremor noted  Gait: Deferred due to acuity of the situation        NIHSS:  1a Level of Consciousness: 0 = Alert   1b  LOC Questions: 0 = Answers both correctly   1c  LOC Commands: 0 = Obeys both correctly   2  Best Gaze: 0 = Normal   3  Visual: 1 = Partial hemianopia   4  Facial Palsy: 0=Normal symmetric movement   5a  Motor Right Arm: 0=No drift, limb holds 90 (or 45) degrees for full 10 seconds   5b  Motor Left Arm: 0=No drift, limb holds 90 (or 45) degrees for full 10 seconds   6a  Motor Right Le=No drift, limb holds 90 (or 45) degrees for full 10 seconds   6b  Motor Left Le=No drift, limb holds 90 (or 45) degrees for full 10 seconds   7  Limb Ataxia:  0=Absent   8  Sensory: 1=Mild to moderate sensory loss; patient feels pinprick is less sharp or is dull on the affected side; there is a loss of superficial pain with pinprick but patient is aware She is being touched   9  Best Language:  0=No aphasia, normal   10  Dysarthria: 1=Mild to moderate, patient slurs at least some words and at worst, can be understood with some difficulty   11  Extinction and Inattention (formerly Neglect): 0=No abnormality   Total Score: 3 (only 1 point for new deficits)     Time NIHSS was completed: 2:23pm    Modified Keith Score: 1-2    Lab Results:   I have personally reviewed pertinent reports    CBC:   Results from last 7 days   Lab Units 22  1722 22  1428   WBC Thousand/uL  --  10 88*   RBC Million/uL  --  4 39   HEMOGLOBIN g/dL  --  13 0   HEMATOCRIT %  --  43 1   MCV fL  --  98   PLATELETS Thousands/uL 231 292   BMP/CMP:   Results from last 7 days   Lab Units 22  1428   SODIUM mmol/L 140   POTASSIUM mmol/L 4 2   CHLORIDE mmol/L 108   CO2 mmol/L 29   BUN mg/dL 14   CREATININE mg/dL 1 19   CALCIUM mg/dL 9 1   EGFR ml/min/1 73sq m 46   , HgBA1C:   , TSH:   , Coagulation:   Results from last 7 days   Lab Units 22  1428   INR  1 03   , Lipid Profile:   , Urinalysis:   Results from last 7 days   Lab Units 22  1556   COLOR UA  Yellow   CLARITY UA  Clear   SPEC GRAV UA  1 010   PH UA 6  0   LEUKOCYTES UA  Negative   NITRITE UA  Negative   GLUCOSE UA mg/dl Negative   KETONES UA mg/dl Negative   BILIRUBIN UA  Negative   BLOOD UA  Trace*     Imaging Studies: I have personally reviewed pertinent reports  and I have personally reviewed pertinent films in PACS  VTE Prophylaxis: Sequential compression device (Venodyne)  and Enoxaparin (Lovenox)    Code Status: Level 3 - DNAR and DNI  Advance Directive and Living Will:       Power of :    POLST:      Counseling / Coordination of Care  Total Critical Care time spent 52 minutes excluding procedures, teaching and family updates

## 2022-01-29 NOTE — ED PROVIDER NOTES
History  Chief Complaint   Patient presents with   [address]    Slurred Speech     Patient is a 81 y/o F with PMH CVA in 2015 with residual left arm and left leg weakness, DM presenting with dysarthria  Pt Dominican speaking, daughter here translating  Daughter states patient with dysarthria first noticed around 9AM this morning on her initial contact with her mother for the day  Daughter spoke with patient before bed last night and she had normal speech  Also concerned for patient speaking "gibberish" and not making sense  Daughter notes she has some left arm and left leg weakness from her prior stroke that is not worsened today  She is on aspirin and plavix daily and notes compliance  Denies recent illness, fevers, chills, CP, SOB, N/V/D, new weakness  Prior to Admission Medications   Prescriptions Last Dose Informant Patient Reported? Taking?    Blood Glucose Monitoring Suppl (ACCU-CHEK YOUSUF CONNECT) w/Device KIT   Yes No   Si Device by Does not apply route daily   Lancets MISC   Yes No   Si each by Does not apply route daily   albuterol (VENTOLIN HFA) 90 mcg/act inhaler   Yes No   Sig: Inhale 2 puffs 2 (two) times a day   aspirin 325 mg tablet   Yes No   Sig: Take 325 mg by mouth   atorvastatin (LIPITOR) 40 mg tablet   Yes No   Sig: Take 40 mg by mouth   budesonide-formoterol (SYMBICORT) 80-4 5 MCG/ACT inhaler   Yes No   Sig: Inhale 1 puff 2 (two) times a day   butalbital-acetaminophen-caffeine (ESGIC PLUS) -40 MG per tablet   No No   Sig: Take 1 tablet by mouth every 4 (four) hours as needed for headaches   butalbital-acetaminophen-caffeine (FIORICET,ESGIC) -40 mg per tablet   Yes No   Sig: Take 1 tablet by mouth every 4 (four) hours   butalbital-acetaminophen-caffeine-codeine (FIORICET WITH CODEINE) -12-30 MG per capsule   Yes No   Sig: Take 1 capsule by mouth every 4 (four) hours as needed for headaches   clopidogrel (PLAVIX) 75 mg tablet   Yes No   Sig: Take 75 mg by mouth daily   folic acid (FOLVITE) 1 mg tablet   Yes No   Sig: Take 1 mg by mouth   glucose blood (ACCU-CHEK YOUSUF PLUS) test strip   Yes No   Si strip by In Vitro route daily   lisinopril (ZESTRIL) 10 mg tablet   Yes No   Sig: Take 10 mg by mouth   metFORMIN (GLUCOPHAGE-XR) 750 mg 24 hr tablet   Yes No   Sig: Take 750 mg by mouth      Facility-Administered Medications: None       Past Medical History:   Diagnosis Date    Diabetes mellitus ([facility] [address] )     Hypertension     Stroke [facility])        Past Surgical History:   Procedure Laterality Date     SECTION      x2       Family History   Problem Relation Age of Onset    Stroke Father      I have reviewed and agree with the history as documented  E-Cigarette/Vaping     E-Cigarette/Vaping Substances     Social History     Tobacco Use    Smoking status: Never Smoker    Smokeless tobacco: Not on file   Substance Use Topics    Alcohol use: No    Drug use: No        Review of Systems   Constitutional: Negative for chills and fever  HENT: Negative for ear pain and sore throat  Eyes: Negative for pain and visual disturbance  Respiratory: Negative for cough and shortness of breath  Cardiovascular: Negative for chest pain and palpitations  Gastrointestinal: Negative for abdominal pain and vomiting  Genitourinary: Negative for dysuria and hematuria  Musculoskeletal: Negative for arthralgias and back pain  Skin: Negative for color change and rash  Neurological: Positive for weakness  Negative for seizures, syncope, numbness and headaches  All other systems reviewed and are negative        Physical Exam  ED Triage Vitals   Temperature Pulse Respirations Blood Pressure SpO2   22 1422 22 1422 22 1422 22 1422 22 1422   97 9 °F (36 6 °C) 102 18 (!) 174/79 98 %      Temp src Heart Rate Source Patient Position - Orthostatic VS BP Location FiO2 (%)   -- 22 1430 22 1900 -- --    Monitor Lying        Pain Score --                    Orthostatic Vital Signs  Vitals:    01/29/22 1600 01/29/22 1829 01/29/22 1900 01/29/22 2000   BP: 160/68 155/75 170/75 (!) 177/74   Pulse: 92 90 74 78   Patient Position - Orthostatic VS:   Lying Lying       Physical Exam  Vitals and nursing note reviewed  Constitutional:       General: She is not in acute distress  Appearance: She is not toxic-appearing  HENT:      Head: Normocephalic and atraumatic  Right Ear: External ear normal       Left Ear: External ear normal       Nose: Nose normal       Mouth/Throat:      Pharynx: Oropharynx is clear  No oropharyngeal exudate or posterior oropharyngeal erythema  Eyes:      Extraocular Movements: Extraocular movements intact  Pupils: Pupils are equal, round, and reactive to light  Cardiovascular:      Rate and Rhythm: Normal rate and regular rhythm  Pulses: Normal pulses  Heart sounds: Normal heart sounds  No murmur heard  No friction rub  No gallop  Pulmonary:      Effort: Pulmonary effort is normal  No respiratory distress  Breath sounds: Normal breath sounds  No wheezing, rhonchi or rales  Abdominal:      General: Abdomen is flat  There is no distension  Palpations: Abdomen is soft  Tenderness: There is no abdominal tenderness  There is no guarding or rebound  Musculoskeletal:         General: No deformity  Normal range of motion  Cervical back: Normal range of motion  No rigidity  Right lower leg: No edema  Left lower leg: No edema  Skin:     General: Skin is warm and dry  Capillary Refill: Capillary refill takes less than 2 seconds  Findings: No rash  Neurological:      Mental Status: She is alert and oriented to person, place, and time  Cranial Nerves: No cranial nerve deficit  Sensory: No sensory deficit  Motor: Weakness present  Comments: No CN deficit  No pronator drift  4/5 strength left UE and LLE   Mild ataxia in left UE, unclear if 2/2 weakness  Pt with dysarthria w/o aphasia  Sensation intact  Psychiatric:         Mood and Affect: Mood normal          ED Medications  Medications   albuterol (PROVENTIL HFA,VENTOLIN HFA) inhaler 2 puff (has no administration in time range)   budesonide-formoterol (SYMBICORT) 80-4 5 MCG/ACT inhaler 1 puff (has no administration in time range)   folic acid (FOLVITE) tablet 1 mg (has no administration in time range)   aspirin chewable tablet 81 mg (has no administration in time range)   ticagrelor (BRILINTA) tablet 180 mg (180 mg Oral Given 1/29/22 1722)     Followed by   ticagrelor (BRILINTA) tablet 90 mg (has no administration in time range)   enoxaparin (LOVENOX) subcutaneous injection 40 mg (has no administration in time range)   insulin lispro (HumaLOG) 100 units/mL subcutaneous injection 1-5 Units (has no administration in time range)   atorvastatin (LIPITOR) tablet 40 mg (has no administration in time range)   iohexol (OMNIPAQUE) 350 MG/ML injection (SINGLE-DOSE) 85 mL (85 mL Intravenous Given 1/29/22 1432)       Diagnostic Studies  Results Reviewed     Procedure Component Value Units Date/Time    P2Y12 Platelet inhibitor [[id number]]  (Normal) Collected: 01/29/22 1722    Lab Status: Final result Specimen: Blood from Arm, Left Updated: 01/29/22 1758     P2Y12 268 PRU     Platelet count [[id number]]  (Normal) Collected: 01/29/22 1722    Lab Status: Final result Specimen: Blood from Arm, Left Updated: 01/29/22 1741     Platelets 147 Thousands/uL      MPV 12 5 fL     COVID/FLU/RSV - 2 hour TAT [[id number]]  (Normal) Collected: 01/29/22 1441    Lab Status: Final result Specimen: Nares from Nose Updated: 01/29/22 1632     SARS-CoV-2 Negative     INFLUENZA A PCR Negative     INFLUENZA B PCR Negative     RSV PCR Negative    Narrative:      FOR PEDIATRIC PATIENTS - copy/paste COVID Guidelines URL to browser: [email or web address]    SARS-CoV-2 assay is a Nucleic Acid Amplification assay intended for the  qualitative detection of nucleic acid from SARS-CoV-2 in nasopharyngeal  swabs  Results are for the presumptive identification of SARS-CoV-2 RNA  Positive results are indicative of infection with SARS-CoV-2, the virus  causing COVID-19, but do not rule out bacterial infection or co-infection  with other viruses  Laboratories within the [place] and its  territories are required to report all positive results to the appropriate  public health authorities  Negative results do not preclude SARS-CoV-2  infection and should not be used as the sole basis for treatment or other  patient management decisions  Negative results must be combined with  clinical observations, patient history, and epidemiological information  This test has not been FDA cleared or approved  This test has been authorized by FDA under an Emergency Use Authorization  (EUA)  This test is only authorized for the duration of time the  declaration that circumstances exist justifying the authorization of the  emergency use of an in vitro diagnostic tests for detection of SARS-CoV-2  virus and/or diagnosis of COVID-19 infection under section 564(b)(1) of  the Act, 21 U  S C  640NZO-0(O)(1), unless the authorization is terminated  or revoked sooner  The test has been validated but independent review by FDA  and CLIA is pending  Test performed using [employer] GeneXpert: This RT-PCR assay targets N2,  a region unique to SARS-CoV-2  A conserved region in the E-gene was chosen  for pan-Sarbecovirus detection which includes SARS-CoV-2  Urine Microscopic [[id number]]  (Abnormal) Collected: 01/29/22 1556    Lab Status: Final result Specimen: Urine, Clean Catch Updated: 01/29/22 1620     RBC, UA 2-4 /hpf      WBC, UA 10-20 /hpf      Epithelial Cells Moderate /hpf      Bacteria, UA Occasional /hpf      Hyaline Casts, UA None Seen /lpf     Urine culture [[id number]] Collected: 01/29/22 1556    Lab Status:  In process Specimen: Urine, Clean Catch Updated: 01/29/22 1620    Urine Macroscopic, POC [[id number]]  (Abnormal) Collected: 01/29/22 1556    Lab Status: Final result Specimen: Urine Updated: 01/29/22 1557     Color, UA Yellow     Clarity, UA Clear     pH, UA 6 0     Leukocytes, UA Negative     Nitrite, UA Negative     Protein, UA Negative mg/dl      Glucose, UA Negative mg/dl      Ketones, UA Negative mg/dl      Urobilinogen, UA 0 2 E U /dl      Bilirubin, UA Negative     Blood, UA Trace     Specific [place], UA 1 010    Narrative:      CLINITEK RESULT    HS Troponin 0hr (reflex protocol) [[id number]]  (Normal) Collected: 01/29/22 1428    Lab Status: Final result Specimen: Blood from Arm, Left Updated: 01/29/22 1500     hs TnI 0hr 3 ng/L     Protime-INR [[id number]]  (Normal) Collected: 01/29/22 1428    Lab Status: Final result Specimen: Blood from Arm, Left Updated: 01/29/22 1456     Protime 13 1 seconds      INR 1 03    APTT [[id number]]  (Normal) Collected: 01/29/22 1428    Lab Status: Final result Specimen: Blood from Arm, Left Updated: 01/29/22 1456     PTT 32 seconds     Basic metabolic panel [[id number]]  (Abnormal) Collected: 01/29/22 1428    Lab Status: Final result Specimen: Blood from Arm, Left Updated: 01/29/22 1453     Sodium 140 mmol/L      Potassium 4 2 mmol/L      Chloride 108 mmol/L      CO2 29 mmol/L      ANION GAP 3 mmol/L      BUN 14 mg/dL      Creatinine 1 19 mg/dL      Glucose 203 mg/dL      Calcium 9 1 mg/dL      eGFR 46 ml/min/1 73sq m     Narrative:      [facility] guidelines for Chronic Kidney Disease (CKD):     Stage 1 with normal or high GFR (GFR > 90 mL/min/1 73 square meters)    Stage 2 Mild CKD (GFR = 60-89 mL/min/1 73 square meters)    Stage 3A Moderate CKD (GFR = 45-59 mL/min/1 73 square meters)    Stage 3B Moderate CKD (GFR = 30-44 mL/min/1 73 square meters)    Stage 4 Severe CKD (GFR = 15-29 mL/min/1 73 square meters)    Stage 5 End Stage CKD (GFR <15 mL/min/1 73 square meters)  Note: GFR calculation is accurate only with a steady state creatinine    Fingerstick Glucose (POCT) [[id number]]  (Abnormal) Collected: 01/29/22 1440    Lab Status: Final result Updated: 01/29/22 1448     POC Glucose 196 mg/dl     CBC and Platelet [[id number]]  (Abnormal) Collected: 01/29/22 1428    Lab Status: Final result Specimen: Blood from Arm, Left Updated: 01/29/22 1437     WBC 10 88 Thousand/uL      RBC 4 39 Million/uL      Hemoglobin 13 0 g/dL      Hematocrit 43 1 %      MCV 98 fL      MCH 29 6 pg      MCHC 30 2 g/dL      RDW 13 4 %      Platelets 529 Thousands/uL      MPV 12 6 fL                  CTA stroke alert (head/neck)   Final Result by Shay Wilde MD (01/29 1456)      No cervical or intracranial large vessel occlusion  Chronic intracranial MCA atherosclerotic disease  Anatomic variations of the anterior circulation and posterior circulation as described above  Findings were directly discussed with Suresh Collins at 2:50 PM                         Workstation performed: [id number]         CT stroke alert brain   Final Result by Shay Wilde MD (01/29 1456)      No acute intracranial abnormality  Chronic ischemic changes as described above  If there is concern for acute on chronic white matter disease, follow-up MRI imaging is recommended  Findings were directly discussed with Suresh Collins at 2:50 PM       Workstation performed: [id number]         XR chest 1 view portable    (Results Pending)   MRI brain wo contrast    (Results Pending)         Procedures  Procedures      ED Course  ED Course as of 01/29/22 2037   Sat Jan 29, 2022   1451 D/w radiologist who did not see large vessel occlusion, hemorrhage, or edema               Identification of Seniors at Risk      Most Recent Value   (ISAR) Identification of Seniors at Risk    Before the illness or injury that brought you to the Emergency, did you need someone to help you on a regular basis?  1 Filed at: 01/29/2022 1432   In the last 24 hours, have you needed more help than usual? 0 Filed at: 01/29/2022 1432   Have you been hospitalized for one or more nights during the past 6 months? 0 Filed at: 01/29/2022 1432   In general, do you see well? 0 Filed at: 01/29/2022 1432   In general, do you have serious problems with your memory? 0 Filed at: 01/29/2022 1432   Do you take more than three different medications every day? 1 Filed at: 01/29/2022 1432   ISAR Score 2 Filed at: 01/29/2022 1432                              [facility]  Number of Diagnoses or Management Options  Dysarthria  TIA (transient ischemic attack)  Diagnosis management comments: 81 y/o F with PMH CVA presenting with concern for stroke-like symptoms  Per daughter, Murtaza Ng initially thought to be 9AM, stroke alert called  On further questioning, daughter states she knows she was normal last night, but unsure if she was normal this morning or if she woke up like this  CT and CTA read as w/o findings concerning for stroke  Pt examines with NIHSS of 3, though 2 points due to deficits of prior stroke, new point given for mild dysarthria  Patient admitted to neurology for stroke pathway  Disposition  Final diagnoses:   TIA (transient ischemic attack)   Dysarthria     Time reflects when diagnosis was documented in both MDM as applicable and the Disposition within this note     Time User Action Codes Description Comment    1/29/2022  2:25 PM Vickey Iglesias Add [G45 9] TIA (transient ischemic attack)     1/29/2022  4:24 PM Lupillo Anderson Add [R47 1] Dysarthria       ED Disposition     ED Disposition Condition Date/Time Comment    Admit Stable Sat Jan 29, 2022  4:25 PM Case was discussed with Dr Isabela Mcrae and the patient's admission status was agreed to be Admission Status: inpatient status to the service of Dr Isabela Mcrae           Follow-up Information    None         Patient's Medications   Discharge Prescriptions    No medications on file     No discharge procedures on file  PDMP Review     None           ED Provider  Attending physically available and evaluated THE [facility]  I managed the patient along with the ED Attending      Electronically Signed by         Joseline Duffy MD  01/29/22 2037

## 2022-01-30 ENCOUNTER — APPOINTMENT (OUTPATIENT)
Dept: NON INVASIVE DIAGNOSTICS | Facility: HOSPITAL | Age: 72
End: 2022-01-30
Payer: COMMERCIAL

## 2022-01-30 VITALS
WEIGHT: 198 LBS | HEART RATE: 81 BPM | BODY MASS INDEX: 36.44 KG/M2 | DIASTOLIC BLOOD PRESSURE: 59 MMHG | SYSTOLIC BLOOD PRESSURE: 169 MMHG | RESPIRATION RATE: 14 BRPM | OXYGEN SATURATION: 97 % | HEIGHT: 62 IN | TEMPERATURE: 98 F

## 2022-01-30 LAB
ANION GAP SERPL CALCULATED.3IONS-SCNC: 6 MMOL/L (ref 4–13)
AORTIC ROOT: 3 CM
APICAL FOUR CHAMBER EJECTION FRACTION: 78 %
ASCENDING AORTA: 2.8 CM (ref 2.04–3.06)
ATRIAL RATE: 78 BPM
ATRIAL RATE: 89 BPM
BUN SERPL-MCNC: 12 MG/DL (ref 5–25)
CALCIUM SERPL-MCNC: 9.9 MG/DL (ref 8.3–10.1)
CHLORIDE SERPL-SCNC: 108 MMOL/L (ref 100–108)
CHOLEST SERPL-MCNC: 94 MG/DL
CO2 SERPL-SCNC: 26 MMOL/L (ref 21–32)
CREAT SERPL-MCNC: 1.04 MG/DL (ref 0.6–1.3)
E WAVE DECELERATION TIME: 247 MS
ERYTHROCYTE [DISTWIDTH] IN BLOOD BY AUTOMATED COUNT: 13.3 % (ref 11.6–15.1)
EST. AVERAGE GLUCOSE BLD GHB EST-MCNC: 134 MG/DL
FRACTIONAL SHORTENING: 34 % (ref 28–44)
GFR SERPL CREATININE-BSD FRML MDRD: 54 ML/MIN/1.73SQ M
GLUCOSE SERPL-MCNC: 103 MG/DL (ref 65–140)
GLUCOSE SERPL-MCNC: 116 MG/DL (ref 65–140)
GLUCOSE SERPL-MCNC: 116 MG/DL (ref 65–140)
GLUCOSE SERPL-MCNC: 118 MG/DL (ref 65–140)
GLUCOSE SERPL-MCNC: 199 MG/DL (ref 65–140)
HBA1C MFR BLD: 6.3 %
HCT VFR BLD AUTO: 39.2 % (ref 34.8–46.1)
HDLC SERPL-MCNC: 43 MG/DL
HGB BLD-MCNC: 12 G/DL (ref 11.5–15.4)
INTERVENTRICULAR SEPTUM IN DIASTOLE (PARASTERNAL SHORT AXIS VIEW): 1.1 CM (ref 0.54–1)
LDLC SERPL CALC-MCNC: 31 MG/DL (ref 0–100)
LEFT ATRIUM AREA SYSTOLE SINGLE PLANE A4C: 17.7 CM2
LEFT ATRIUM SIZE: 3.8 CM
LEFT INTERNAL DIMENSION IN SYSTOLE: 2.5 CM (ref 2.1–4)
LEFT VENTRICULAR INTERNAL DIMENSION IN DIASTOLE: 3.8 CM (ref 4.93–7.34)
LEFT VENTRICULAR POSTERIOR WALL IN END DIASTOLE: 1.2 CM (ref 0.52–0.99)
LEFT VENTRICULAR STROKE VOLUME: 39 ML
MCH RBC QN AUTO: 29.4 PG (ref 26.8–34.3)
MCHC RBC AUTO-ENTMCNC: 30.6 G/DL (ref 31.4–37.4)
MCV RBC AUTO: 96 FL (ref 82–98)
MV E'TISSUE VEL-SEP: 7 CM/S
MV PEAK A VEL: 1.12 M/S
MV PEAK E VEL: 80 CM/S
MV STENOSIS PRESSURE HALF TIME: 0 MS
P AXIS: 69 DEGREES
P AXIS: 79 DEGREES
PLATELET # BLD AUTO: 244 THOUSANDS/UL (ref 149–390)
PMV BLD AUTO: 12.7 FL (ref 8.9–12.7)
POTASSIUM SERPL-SCNC: 3.9 MMOL/L (ref 3.5–5.3)
PR INTERVAL: 160 MS
PR INTERVAL: 176 MS
QRS AXIS: 68 DEGREES
QRS AXIS: 74 DEGREES
QRSD INTERVAL: 72 MS
QRSD INTERVAL: 78 MS
QT INTERVAL: 350 MS
QT INTERVAL: 358 MS
QTC INTERVAL: 408 MS
QTC INTERVAL: 425 MS
RA PRESSURE ESTIMATED: 3 MMHG
RBC # BLD AUTO: 4.08 MILLION/UL (ref 3.81–5.12)
RIGHT ATRIUM AREA SYSTOLE A4C: 14.5 CM2
RIGHT VENTRICLE ID DIMENSION: 3 CM
SL CV LV EF: 70
SL CV PED ECHO LEFT VENTRICLE DIASTOLIC VOLUME (MOD BIPLANE) 2D: 62 ML
SL CV PED ECHO LEFT VENTRICLE SYSTOLIC VOLUME (MOD BIPLANE) 2D: 23 ML
SODIUM SERPL-SCNC: 140 MMOL/L (ref 136–145)
T WAVE AXIS: 69 DEGREES
T WAVE AXIS: 90 DEGREES
TRIGL SERPL-MCNC: 98 MG/DL
VENTRICULAR RATE: 78 BPM
VENTRICULAR RATE: 89 BPM
WBC # BLD AUTO: 11.85 THOUSAND/UL (ref 4.31–10.16)
Z-SCORE OF ASCENDING AORTA: 0.97
Z-SCORE OF INTERVENTRICULAR SEPTUM IN END DIASTOLE: 2.76
Z-SCORE OF LEFT VENTRICULAR DIMENSION IN END SYSTOLE: -4.59
Z-SCORE OF LEFT VENTRICULAR POSTERIOR WALL IN END DIASTOLE: 3.71

## 2022-01-30 PROCEDURE — 97163 PT EVAL HIGH COMPLEX 45 MIN: CPT

## 2022-01-30 PROCEDURE — 80048 BASIC METABOLIC PNL TOTAL CA: CPT | Performed by: PSYCHIATRY & NEUROLOGY

## 2022-01-30 PROCEDURE — 85027 COMPLETE CBC AUTOMATED: CPT | Performed by: PSYCHIATRY & NEUROLOGY

## 2022-01-30 PROCEDURE — 93306 TTE W/DOPPLER COMPLETE: CPT | Performed by: INTERNAL MEDICINE

## 2022-01-30 PROCEDURE — 92610 EVALUATE SWALLOWING FUNCTION: CPT

## 2022-01-30 PROCEDURE — 99217 PR OBSERVATION CARE DISCHARGE MANAGEMENT: CPT | Performed by: PSYCHIATRY & NEUROLOGY

## 2022-01-30 PROCEDURE — 93010 ELECTROCARDIOGRAM REPORT: CPT | Performed by: INTERNAL MEDICINE

## 2022-01-30 PROCEDURE — 82948 REAGENT STRIP/BLOOD GLUCOSE: CPT

## 2022-01-30 PROCEDURE — 83036 HEMOGLOBIN GLYCOSYLATED A1C: CPT | Performed by: PSYCHIATRY & NEUROLOGY

## 2022-01-30 PROCEDURE — 80061 LIPID PANEL: CPT | Performed by: PSYCHIATRY & NEUROLOGY

## 2022-01-30 PROCEDURE — 97166 OT EVAL MOD COMPLEX 45 MIN: CPT

## 2022-01-30 PROCEDURE — C8929 TTE W OR WO FOL WCON,DOPPLER: HCPCS

## 2022-01-30 PROCEDURE — 99219 PR INITIAL OBSERVATION CARE/DAY 50 MINUTES: CPT | Performed by: INTERNAL MEDICINE

## 2022-01-30 RX ORDER — ASPIRIN 81 MG/1
81 TABLET, CHEWABLE ORAL DAILY
Qty: 90 TABLET | Refills: 0 | Status: SHIPPED | OUTPATIENT
Start: 2022-01-31 | End: 2022-08-07

## 2022-01-30 RX ADMIN — ATORVASTATIN CALCIUM 40 MG: 40 TABLET, FILM COATED ORAL at 17:11

## 2022-01-30 RX ADMIN — BUDESONIDE AND FORMOTEROL FUMARATE DIHYDRATE 1 PUFF: 80; 4.5 AEROSOL RESPIRATORY (INHALATION) at 09:04

## 2022-01-30 RX ADMIN — TICAGRELOR 90 MG: 90 TABLET ORAL at 12:06

## 2022-01-30 RX ADMIN — ALBUTEROL SULFATE 2 PUFF: 90 AEROSOL, METERED RESPIRATORY (INHALATION) at 02:02

## 2022-01-30 RX ADMIN — BUDESONIDE AND FORMOTEROL FUMARATE DIHYDRATE 1 PUFF: 80; 4.5 AEROSOL RESPIRATORY (INHALATION) at 17:11

## 2022-01-30 RX ADMIN — PERFLUTREN 0.6 ML/MIN: 6.52 INJECTION, SUSPENSION INTRAVENOUS at 14:30

## 2022-01-30 RX ADMIN — INSULIN LISPRO 1 UNITS: 100 INJECTION, SOLUTION INTRAVENOUS; SUBCUTANEOUS at 11:48

## 2022-01-30 RX ADMIN — ENOXAPARIN SODIUM 40 MG: 40 INJECTION SUBCUTANEOUS at 09:04

## 2022-01-30 RX ADMIN — FOLIC ACID 1 MG: 1 TABLET ORAL at 09:04

## 2022-01-30 RX ADMIN — ASPIRIN 81 MG: 81 TABLET, CHEWABLE ORAL at 09:04

## 2022-01-30 NOTE — DISCHARGE INSTRUCTIONS
Efectos de un derrame cerebral   LO QUE NECESITA SABER:   Los efectos de un accidente cerebrovascular pueden ser diferentes para cada persona  Pueden depender de donde ocurrió el accidente cerebrovascular en el cerebro y cuánto daño hay  Algunas personas podrían recuperarse por completo  Otras podrían sufrir efectos a Danyell Jetty  Es importante hablar con jackson médico sobre cualquier síntoma que tenga después de un derrame  Hay medicamentos y terapias que ayudan a [place] de un derrame  INSTRUCCIONES SOBRE [facility]ITAL[facility]:   Llame al 911 o pida a otra persona que llame al 911 en cualquiera de los siguientes casos:   · Usted tiene alguno de los siguientes signos de derrame cerebral:      ¨ Adormecimiento o caída de un lado de jackson isael     ¨ Debilidad en un brazo o mayte pierna    ¨ Confusión o debilidad para hablar    ¨ Mareos o dolor de sukh intenso, o pérdida de la visión  ·            · A usted no lo pueden despertar  · Usted se  y se golpea la sukh  · Usted sufre mayte convulsión  · Usted se siente mareada, le hace falta el aire y tiene dolor de [place]  · Usted expectora clyde  Busque atención médica de inmediato si:   · Jackson brazo o pierna está adolorido, lorenz o más domonique de lo normal      · Usted se siente mareado, débil o tiene sensación de [place]  · Usted se  sin golpearse la sukh  · Jackson nivel de azúcar en la clyde o jackson presión arterial es más alto o bajo de lo que ha indicado jackson médico     · Usted sangra muy abena o no puede dejar de sangrar de mayte cortada o lesión  · Usted tiene dolor de sukh nuevo o severo  Pregúntele a jackson Pepper Chance vitaminas y minerales son adecuados para usted  · Usted tiene fiebre  · Usted tiene sarpullido  · Usted tiene nuevos síntomas o angie síntomas empeoran  · Usted se siente extremadamente cade o ansioso  · Usted siente que no puede lidiar con jackson condición  · Usted tiene dificultad para dormir  · Usted tiene [place]  · Usted se atraganta o tose cuando come o robert algo  · Usted tiene preguntas o inquietudes acerca de jackson condición o cuidado  Problemas con el habla, lenguaje o memoria:   · Dificultad para encontrar las palabras correctas o formar oraciones completas    · Dificultad para juntar palabras que anthony sentido    · Dificultad para prestar atención, o la habilidad para concentrarse por muy poco tiempo    · Dificultad para escribir o leer    · Problemas con la memoria, o sentirse desorientado con la hora, lugares o situaciones    · Problemas para pensar claramente, o sentirse confundido    · Dificultad para comprender el idioma escrito o hablado, o para aprender algo nuevo  Problemas musculares y nerviosos:  Un derrame podría afectar un lado de jackson cuerpo o parte de un lado  Puede que usted corra un mayor riesgo de caídas si tiene dificultad para  los músculos de angie piernas   Puede presentar cualquiera de los siguientes signos o síntomas:  · Incapacidad de  jackson brazo, pierna o un lado de jackson yordy (parálisis)    · Debilidad muscular, espasmos o músculos que se quedan en mayte posición (contraídos)    · Falta de equilibrio, dificultad para caminar o dificultad para agarrar objetos    · Lockanjana Glez en jackson vista o ricco vista    · Ignorar o no estar consciente de un lado de jackson cuerpo    · Adormecimiento de jackson brazo, mano, dedos, pierna, pie o dedos de los pies    · Dolor, hormigueo o pinchazos en las partes débiles o paralizadas de jackson cuerpo  Problemas intestinales o de vejiga:   · Pérdida del control de la orina o de las evacuaciones intestinales    · Sentirse maggy si tuviera que orinar frecuentemente, aun cuando jackson vejiga no está llena    · Dificultad para vaciar jackson vejiga, estreñimiento  Problemas para tragar o comer:   · Dificultad para tragar alimentos    · Dificultad para alimentarse    · Falta del sentido del gusto o un cambio en la forma en que usted prueba las cosas    · Riesgo más alto de inhalación (cuando la comida pasa a los pulmones) y neumonía debido a los problemas para tragar  Cambios en personalidad o estado de ánimo:   · Depresión, tristeza, irritabilidad o desesperanza    · Dorette Churn, frustración o ansiedad    · Dificultad para controlar emociones o expresar emociones inapropiadas    · Cambios de humor rápidos  Fatiga:   · Niveles bajos de energía    · Cansarse con facilidad    · Falta de motivación  Problemas para dormir:   · Desarrollo del apnea del sueño    · Cambios en el sueño, maggy dormir demasiado o no lo suficiente  Problemas con la función sexual:   · Dificultad para tener o mantener mayte erección    · Sequedad vaginal    · Mayte disminución en el interés por el sexo  Cuidados personales después de un derrame:   · Acuda a rehabilitación maggy se le indique  La rehabilitación es mayte parte importante del tratamiento  Un terapeuta del habla le ayuda a aprender de nuevo y a mejorar jackson capacidad de hablar y de tragar  Usted podría comenzar lentamente y empezar a realizar tareas más difíciles con el tiempo  Los fisioterapeutas pueden ayudarlo a obtener fuerza y resistencia  Los terapeutas ocupacionales le enseñan nuevas formas de hacer angie actividades cotidianas, maggy vestirse  La terapia puede ayudarlo a mejorar jackson capacidad de caminar y de LEX el equilibrio  Jackson terapia podría incluir tareas o movimientos que usted necesitará hacer para las actividades cotidianas  Un ejemplo es el poder subirse o levantarse usted mismo de mayte silla  · Quite cualquier cosa en la que se pueda tropezar  Quite cualquier cosa con la que pueda tropezarse  Coloque cinta [employer]  Mantenga libres los pasillos libres en jackson hogar  Asegúrese que jackson hogar esté charo iluminado  Coloque material antiderrapante en las superficies que podrían estar resbaladizas  Un ejemplo sería jackson karen de baño o el piso de la ducha  · Use aparatos de asistencia    Un bastón o mayte caminadora podrían ayudarlo a mantener jackson equilibrio mientras camina  · Controle otras afecciones médicas  Controle angie trastornos cardíacos, presión arterial y diabetes  El [place] de estos trastornos puede reducir jackson riesgo de otro derrame  Tómese angie medicamentos maggy se le indique  Siga las indicaciones de jackson médico para jackson dieta  · Únase a un jerry de apoyo  La scott después de un derrame puede ser difícil  Podría ser útil hablar con otras personas que amaya sufrido un derrame  También hay grupos de apoyo para miembros de la robin o personas de [de-identified] de las personas que amaya sufrido un derrame  Pídale a jackson médico más información sobre grupos de [place]  Conozca los signos de la depresión: La depresión podría ocurrir después de tener un accidente cerebrovascular  La depresión puede reducir jackson calidad de scott y llevar a que usted deje de hacer cosas que podrían fortalecerlo  La depresión puede llevar a que usted pierda jackson independencia  Conozca los signos de la depresión para que pueda recibir [place]  Dígale a angie familiares y amigos que si notan estos signos, deben informar a jackson médico  Usted podría mostrar cualquiera de los siguientes signos de depresión:  · Tristeza extrema    · Roslyn la interacción social con familiares o amigos    · Falta de interés en cosas que antes disfrutaba    · Irritabilidad    · Dificultad para dormir    · Niveles bajos de energía    · Un cambio en hábitos alimenticios, ganancia o pérdida repentina de peso  Manejar después de un derrame:  No maneje un automóvil sin hablar antes con jackson médico o terapeuta ocupacional  Los efectos de un derrame podrían hacer del manejo mayte actividad difícil o insegura  Es probable que usted tenga que someterse a mayte evaluación de manejo antes de poder manejar un jose de forma vang y legal  Driscilla [place] es probable que necesite ashley un programa de capacitación para manejar o instalar equipo especial en jackson jose   Pídale a jackson médico más información sobre manejar después de un derrame  Para apoyo y más información:   · National Stroke Association  [address] , [place] [address]  Phone: [phone]  Web Address: Cal com au  org  Acuda a laura consultas de control con hassan médico según le indicaron  Es posible que usted necesite concurrir para que le realicen exámenes periódicos de hassan función cerebral  Si usted está tomando warfarina, usted necesitará hacer citas para análisis de [place] periódicos  Laura niveles del índice internacional normalizado (IIN) también necesitan revisarse  Estos exámenes ayudan a determinar si usted está tomando la cantidad apropiada de Verneda Amarillo  Anote laura preguntas para que se acuerde de hacerlas maikel laura visitas  © 2017 [address]  Information is for End User's use only and may not be sold, redistributed or otherwise used for commercial purposes  All illustrations and images included in CareNotes® are the copyrighted property of A D A M , Inc  or Gustabo Lang  Esta información es sólo para uso en educación  Hassan intención no es darle un consejo médico sobre enfermedades o tratamientos  Colsulte con hassan Omar Pinta farmacéutico antes de seguir cualquier régimen médico para saber si es seguro y efectivo para usted

## 2022-01-30 NOTE — OCCUPATIONAL THERAPY NOTE
Occupational Therapy Evaluation     Patient Name: Erwin Aponte  Today's Date: 2022  Problem List  Principal Problem:    Dysarthria due to acute stroke [facility])  Active Problems:    History of CVA (cerebrovascular accident)    Left-sided weakness    Mild intermittent asthma without complication    Morbid obesity ([facility] [address] )    Vertigo    Diabetes mellitus type 2, noninsulin dependent ([facility] )    Migraine    Primary hypertension    Stroke-like symptoms    Past Medical History  Past Medical History:   Diagnosis Date    Diabetes mellitus ([facility] )     Hypertension     Stroke [facility])      Past Surgical History  Past Surgical History:   Procedure Laterality Date     SECTION      x2             22 0938   OT Last Visit   OT Visit Date 22   Note Type   Note type Evaluation   Restrictions/Precautions   Weight Bearing Precautions Per Order No   Other Precautions Impulsive; Bed Alarm;Multiple lines;Telemetry; Fall Risk   Pain Assessment   Pain Assessment Tool 0-10   Pain Score No Pain   Patient's Stated Pain Goal No pain   Hospital Pain Intervention(s) Repositioned; Ambulation/increased activity; Emotional support   Home Living   Type of Home Apartment  (6th floor )   Home Layout Elevator; One level;Performs ADLs on one level; Able to live on main level with bedroom/bathroom   Bathroom Shower/Tub Tub/shower unit   Bathroom Toilet Standard   Bathroom Equipment Grab bars in shower; Shower chair;Grab bars around toilet   Home Equipment Walker;Quad cane   Additional Comments Pt reports living in Apt w/ spouse and tub/shower unit w/ grab bars/SC/ and standard toilet w/ grab bars  Pt ambulated w/ quad cane PTA   Prior Function   Level of [place] Needs assistance with IADLs; Needs assistance with ADLs and functional mobility   Lives With Spouse   Receives Help From Family   ADL Assistance Needs assistance   IADLs Needs assistance   Falls in the last 6 months 0   Vocational Retired   Comments Pt reports living w/ spouse but receives A w/ all ADLs, IADLS from [facility] (caregiver)  [facility] visits everyday 8am-7pm     Lifestyle   Autonomy Needs A w/ all ADLs,IADLs, transfers anf functional mobility w/ quad cane   Reciprocal Relationships lives w/ spouse ; receives A from [facility] everyday (caregiver)   Service to Others retired   [address] enjoys watching tv and playing games   Psychosocial   Psychosocial (WDL) WDL   Patient Behaviors/Mood Cooperative;Verbal;Pleasant   ADL   Where Assessed Edge of bed   Eating Assistance 5  Supervision/Setup   Grooming Assistance 5  Supervision/Setup   UB Bathing Assistance 5  Supervision/Setup   LB Bathing Assistance 4  Minimal Assistance   UB Dressing Assistance 5  Supervision/Setup   LB Dressing Assistance 4  [address] 4  Minimal Assistance   Additional Comments Pt requires S w/ UB ADLs and Min A x1 w/ LB ADLs w/ verbal cues for safety and extended time  Bed Mobility   Rolling L 5  Supervision   Additional items Assist x 1; Increased time required;Verbal cues   Supine to Sit 5  Supervision   Additional items Increased time required;Assist x 1;Verbal cues   Additional Comments Pt requires S w/ bed mobility w/ extended time and verbal cues for safety  Pt sat EOB for 10 mins and tolerated well  Transfers   Sit to Stand 4  Minimal assistance   Additional items Assist x 1; Increased time required;Verbal cues   Stand to Sit 4  Minimal assistance   Additional items Assist x 1; Increased time required;Verbal cues   Additional Comments Pt required Min A x1 w/ STS transfer w/ verbal cueing w/ quad cane for safety and extended time  Pt was educated on how to properly manage and hold quad cane before ambulation  Functional Mobility   Functional Mobility 4  Minimal assistance   Additional Comments Pt required Min A x1 w/ extended time and safety awareness for functional mobility and ambulated short household distance  Pt needed quad cane and VC to keep quad cane flat on floor  After ambulation pt properly demonstrated quad cane use  Additional items   (quad cane)   Balance   Static Sitting Fair   Dynamic Sitting Fair   Static Standing Fair -   Dynamic Standing Poor +   Ambulatory Poor +   Activity Tolerance   Activity Tolerance Patient tolerated treatment well   Medical Staff Made Aware yes, PT Sky   Nurse Made Aware yes, Nurse Dariela CRAWFORD Assessment   RUE Assessment WFL   LUE Assessment   LUE Assessment WFL  (pt demonstrated slight LUE weakness)   Hand Function   Gross Motor Coordination Functional   Fine Motor Coordination Functional   Sensation   Light Touch Partial deficits in the LUE;Partial deficits in the LLE   Additional Comments PMH- Pt experiencing some numbness in LUE and LLE   Vision-Basic Assessment   Current Vision Wears glasses only for reading   Vision - Complex Assessment   Ocular Range of Motion [facility]   Perception   Inattention/Neglect Appears intact   Cognition   Overall Cognitive Status WFL   Arousal/Participation Alert; Responsive; Cooperative   Attention Attends with cues to redirect   Orientation Level Oriented X4   Memory Within functional limits   Following Commands Follows one step commands without difficulty   Comments Pt was alert, responsive and verbal throughout session, Pt was pleasant and followed one step commands without repetition  Pt able to demonstrate recall education on quad cane; demonstrated proper hand placement   Assessment   Limitation Decreased endurance;Decreased self-care trans;Decreased high-level ADLs; Decreased sensation   Prognosis Fair   Assessment Pt is a 80 y o female and was admitted 1/29/2022 due to slurred speech and stroke like symptoms w/ L facial numbness/tingling  Pt has hx of 3 strokes in 2015 leaving residual LLE/LUE weakness and numbness  Pt was consulted to neurology and diagnosed w/ no acute findings   Pt  has a past medical history of Diabetes mellitus ([facility] [address] ), Hypertension, and Stroke [facility])  Pt has up w/ assistance orders  Pt currently lives w/ spouse in 6th floor apt w/ elevator w/ 0STE, and tub/shower w/ SC, grab bars and standard toilet w/ grab bars  PTA, forest (caregiver) provided A every day 8am-7pm w/ all ADLS/IADLS, transfers and functional mobility w/ quad cane  Pt is A/O x 4 and is not driving  Pt assessed and needed S w/ all UB ADLs and Min A x1 w/ LB ADLs, bed mobility, functional mobility and transfers w/ quad cane  Pt educated on proper hand placement and usage of quad cane  Pt able to reciprocate education at end of assessment to ensure safety  Pt is currently limited and restricted due to: increased fall risk, decreased sensation and decrease activity tolerance  Due to limitations being PMH residual deficits and pt being S/Min A x 1 w/ ADLs/IADLs, bed mobility, transfers and functional mobility w/ quad cane; recommend home discharge w/ continued family support upon D/C when medically stable and clinically appropriate  Recommend nursing staff to continue to engage in skillful functional activities/task to increase engagement in meaningful occupations  No further acute skilled OT needs  WILL D/C OT      Goals   Patient Goals to go home   Recommendation   OT Discharge Recommendation No rehabilitation needs   OT - OK to Discharge Yes   Additional Comments  per OT: pt appropriate to D/C home; due to pt is at baseline and receives A from dgemre (caregiver) every day   AM-PAC Daily Activity Inpatient   Lower Body Dressing 3   Bathing 3   Toileting 3   Upper Body Dressing 3   Grooming 4   Eating 4   Daily Activity Raw Score 20   Daily Activity Standardized Score (Calc for Raw Score >=11) 42 03   AM-PAC Applied Cognition Inpatient   Following a Speech/Presentation 4   Understanding Ordinary Conversation 4   Taking Medications 4   Remembering Where Things Are Placed or Put Away 4   Remembering List of 4-5 Errands 4   Taking Care of Complicated Tasks 3   Applied Cognition Raw Score 23   Applied Cognition Standardized Score 53 08   Barthel Index   Feeding 5   Bathing 0   Grooming Score 0   Dressing Score 5   Bladder Score 5   Bowels Score 5   Toilet Use Score 5   Transfers (Bed/Chair) Score 10   Mobility (Level Surface) Score 10   Stairs Score 0   Barthel Index Score 45     Betzaida Diggs, OTS

## 2022-01-30 NOTE — ASSESSMENT & PLAN NOTE
History of DM2 on metformin   - hold metformin while inpatient and because she received IV contrast  - Fingersticks and sliding scale insulin TIDAC    Recent Labs     01/29/22  1440   POCGLU 196*       Blood Sugar Average: Last 72 hrs:  (P) 196

## 2022-01-30 NOTE — ASSESSMENT & PLAN NOTE
- permissive HTN for 24 hours  Holding home lisinopril for now      After discharge, patient can start back on lisinopril 10mg tomorrow

## 2022-01-30 NOTE — PLAN OF CARE
Problem: MOBILITY - ADULT  Goal: Maintain or return to baseline ADL function  Description: INTERVENTIONS:  -  Assess patient's ability to carry out ADLs; assess patient's baseline for ADL function and identify physical deficits which impact ability to perform ADLs (bathing, care of mouth/teeth, toileting, grooming, dressing, etc )  - Assess/evaluate cause of self-care deficits   - Assess range of motion  - Assess patient's mobility; develop plan if impaired  - Assess patient's need for assistive devices and provide as appropriate  - Encourage maximum independence but intervene and supervise when necessary  - Involve family in performance of ADLs  - Assess for home care needs following discharge   - Consider OT consult to assist with ADL evaluation and planning for discharge  - Provide patient education as appropriate  1/30/2022 1758 by Ras Salas RN  Outcome: Completed  1/30/2022 0800 by Ras Salas RN  Outcome: Progressing  Goal: Maintains/Returns to pre admission functional level  Description: INTERVENTIONS:  - Perform BMAT or MOVE assessment daily    - Set and communicate daily mobility goal to care team and patient/family/caregiver  - Collaborate with rehabilitation services on mobility goals if consulted  - Perform Range of Motion  times a day  - Reposition patient every  hours    - Dangle patient  times a day  - Stand patient  times a day  - Ambulate patient  times a day  - Out of bed to chair  times a day   - Out of bed for meals  times a day  - Out of bed for toileting  - Record patient progress and toleration of activity level   1/30/2022 1758 by Ras Salas RN  Outcome: Completed  1/30/2022 0800 by Ras Salas RN  Outcome: Progressing     Problem: Prexisting or High Potential for Compromised Skin Integrity  Goal: Skin integrity is maintained or improved  Description: INTERVENTIONS:  - Identify patients at risk for skin breakdown  - Assess and monitor skin integrity  - Assess and monitor nutrition and hydration status  - Monitor labs   - Assess for incontinence   - Turn and reposition patient  - Assist with mobility/ambulation  - Relieve pressure over bony prominences  - Avoid friction and shearing  - Provide appropriate hygiene as needed including keeping skin clean and dry  - Evaluate need for skin moisturizer/barrier cream  - Collaborate with interdisciplinary team   - Patient/family teaching  - Consider wound care consult   1/30/2022 1758 by Evelyn Ma RN  Outcome: Completed  1/30/2022 0800 by Evelyn Ma RN  Outcome: Progressing     Problem: PAIN - ADULT  Goal: Verbalizes/displays adequate comfort level or baseline comfort level  Description: Interventions:  - Encourage patient to monitor pain and request assistance  - Assess pain using appropriate pain scale  - Administer analgesics based on type and severity of pain and evaluate response  - Implement non-pharmacological measures as appropriate and evaluate response  - Consider cultural and social influences on pain and pain management  - Notify physician/advanced practitioner if interventions unsuccessful or patient reports new pain  1/30/2022 1758 by Evelyn Ma RN  Outcome: Completed  1/30/2022 0800 by Evelyn Ma RN  Outcome: Progressing     Problem: INFECTION - ADULT  Goal: Absence or prevention of progression during hospitalization  Description: INTERVENTIONS:  - Assess and monitor for signs and symptoms of infection  - Monitor lab/diagnostic results  - Monitor all insertion sites, i e  indwelling lines, tubes, and drains  - Monitor endotracheal if appropriate and nasal secretions for changes in amount and color  - [place] appropriate cooling/warming therapies per order  - Administer medications as ordered  - Instruct and encourage patient and family to use good hand hygiene technique  - Identify and instruct in appropriate isolation precautions for identified infection/condition  1/30/2022 1758 by Lc Escobar Mitesh Benavidez RN  Outcome: Completed  1/30/2022 0800 by Luann Arora RN  Outcome: Progressing  Goal: Absence of fever/infection during neutropenic period  Description: INTERVENTIONS:  - Monitor WBC    1/30/2022 1758 by Luann Arora RN  Outcome: Completed  1/30/2022 0800 by Luann Arora RN  Outcome: Progressing     Problem: SAFETY ADULT  Goal: Maintain or return to baseline ADL function  Description: INTERVENTIONS:  -  Assess patient's ability to carry out ADLs; assess patient's baseline for ADL function and identify physical deficits which impact ability to perform ADLs (bathing, care of mouth/teeth, toileting, grooming, dressing, etc )  - Assess/evaluate cause of self-care deficits   - Assess range of motion  - Assess patient's mobility; develop plan if impaired  - Assess patient's need for assistive devices and provide as appropriate  - Encourage maximum independence but intervene and supervise when necessary  - Involve family in performance of ADLs  - Assess for home care needs following discharge   - Consider OT consult to assist with ADL evaluation and planning for discharge  - Provide patient education as appropriate  1/30/2022 1758 by Luann Arora RN  Outcome: Completed  1/30/2022 0800 by Luann Arora RN  Outcome: Progressing  Goal: Maintains/Returns to pre admission functional level  Description: INTERVENTIONS:  - Perform BMAT or MOVE assessment daily    - Set and communicate daily mobility goal to care team and patient/family/caregiver  - Collaborate with rehabilitation services on mobility goals if consulted  - Perform Range of Motion  times a day  - Reposition patient every  hours    - Dangle patient  times a day  - Stand patient  times a day  - Ambulate patient  times a day  - Out of bed to chair  times a day   - Out of bed for meals  times a day  - Out of bed for toileting  - Record patient progress and toleration of activity level   1/30/2022 1758 by Luann Arora RN  Outcome: Completed  1/30/2022 0800 by Keagan Jones RN  Outcome: Progressing  Goal: Patient will remain free of falls  Description: INTERVENTIONS:  - Educate patient/family on patient safety including physical limitations  - Instruct patient to call for assistance with activity   - Consult OT/PT to assist with strengthening/mobility   - Keep Call bell within reach  - Keep bed low and locked with side rails adjusted as appropriate  - Keep care items and personal belongings within reach  - Initiate and maintain comfort rounds  - Make Fall Risk Sign visible to staff  - Offer Toileting every  Hours, in advance of need  - Initiate/Maintain alarm  - Obtain necessary fall risk management equipment:  - Apply yellow socks and bracelet for high fall risk patients  - Consider moving patient to room near nurses station  1/30/2022 1758 by Keagan Jones RN  Outcome: Completed  1/30/2022 0800 by Keagan Jones RN  Outcome: Progressing     Problem: DISCHARGE PLANNING  Goal: Discharge to home or other facility with appropriate resources  Description: INTERVENTIONS:  - Identify barriers to discharge w/patient and caregiver  - Arrange for needed discharge resources and transportation as appropriate  - Identify discharge learning needs (meds, wound care, etc )  - Arrange for interpretive services to assist at discharge as needed  - Refer to Case Management Department for coordinating discharge planning if the patient needs post-hospital services based on physician/advanced practitioner order or complex needs related to functional status, cognitive ability, or social support system  1/30/2022 1758 by Keagan Jones RN  Outcome: Completed  1/30/2022 0800 by Keagan Jones RN  Outcome: Progressing     Problem: Knowledge Deficit  Goal: Patient/family/caregiver demonstrates understanding of disease process, treatment plan, medications, and discharge instructions  Description: Complete learning assessment and assess knowledge base   Interventions:  - Provide teaching at level of understanding  - Provide teaching via preferred learning methods  1/30/2022 1758 by Roselind Goodpasture, RN  Outcome: Completed  1/30/2022 0800 by Roselind Goodpasture, RN  Outcome: Progressing     Problem: Neurological Deficit  Goal: Neurological status is stable or improving  Description: Interventions:  - Monitor and assess patient's level of consciousness, motor function, sensory function, and level of assistance needed for ADLs  - Monitor and report changes from baseline  Collaborate with interdisciplinary team to initiate plan and implement interventions as ordered  - Provide and maintain a safe environment  - Consider seizure precautions  - Consider fall precautions  - Consider aspiration precautions  - Consider bleeding precautions  1/30/2022 1758 by Roselind Goodpasture, RN  Outcome: Completed  1/30/2022 0800 by Roselind Goodpasture, RN  Outcome: Progressing     Problem: Activity Intolerance/Impaired Mobility  Goal: Mobility/activity is maintained at optimum level for patient  Description: Interventions:  - Assess and monitor patient  barriers to mobility and need for assistive/adaptive devices  - Assess patient's emotional response to limitations  - Collaborate with interdisciplinary team and initiate plans and interventions as ordered  - Encourage independent activity per ability   - Maintain proper body alignment  - Perform active/passive rom as tolerated/ordered  - Plan activities to conserve energy   - Turn patient as appropriate  1/30/2022 1758 by Roselind Goodpasture, RN  Outcome: Completed  1/30/2022 0800 by Roselind Goodpasture, RN  Outcome: Progressing     Problem: Communication Impairment  Goal: Ability to express needs and understand communication  Description: Assess patient's communication skills and ability to understand information    Patient will demonstrate use of effective communication techniques, alternative methods of communication and understanding even if not able to speak  - Encourage communication and provide alternate methods of communication as needed  - Collaborate with case management/ for discharge needs  - Include patient/family/caregiver in decisions related to communication  1/30/2022 1758 by Jay Gilbert RN  Outcome: Completed  1/30/2022 0800 by Jay Gilbert RN  Outcome: Progressing     Problem: Potential for Aspiration  Goal: Non-ventilated patient's risk of aspiration is minimized  Description: Assess and monitor vital signs, respiratory status, and labs (WBC)  Monitor for signs of aspiration (tachypnea, cough, rales, wheezing, cyanosis, fever)  - Assess and monitor patient's ability to swallow  - Place patient up in chair to eat if possible  - HOB up at 90 degrees to eat if unable to get patient up into chair   - Supervise patient during oral intake  - Instruct patient/ family to take small bites  - Instruct patient/ family to take small single sips when taking liquids  - Follow patient-specific strategies generated by speech pathologist   1/30/2022 1758 by Jay Gilbert RN  Outcome: Completed  1/30/2022 0800 by Jay Gilbert RN  Outcome: Progressing     Problem: Nutrition  Goal: Nutrition/Hydration status is improving  Description: Monitor and assess patient's nutrition/hydration status for malnutrition (ex- brittle hair, bruises, dry skin, pale skin and conjunctiva, muscle wasting, smooth red tongue, and disorientation)  Collaborate with interdisciplinary team and initiate plan and interventions as ordered  Monitor patient's weight and dietary intake as ordered or per policy  Utilize nutrition screening tool and intervene per policy  Determine patient's food preferences and provide high-protein, high-caloric foods as appropriate  - Assist patient with eating   - Allow adequate time for meals   - Encourage patient to take dietary supplement as ordered    - Collaborate with clinical nutritionist   - Include patient/family/caregiver in decisions related to nutrition    1/30/2022 1758 by Linda Rico RN  Outcome: Completed  1/30/2022 0800 by Linda Rico RN  Outcome: Progressing     Problem: Potential for Falls  Goal: Patient will remain free of falls  Description: INTERVENTIONS:  - Educate patient/family on patient safety including physical limitations  - Instruct patient to call for assistance with activity   - Consult OT/PT to assist with strengthening/mobility   - Keep Call bell within reach  - Keep bed low and locked with side rails adjusted as appropriate  - Keep care items and personal belongings within reach  - Initiate and maintain comfort rounds  - Make Fall Risk Sign visible to staff  - Offer Toileting every  Hours, in advance of need  - Initiate/Maintain alarm  - Obtain necessary fall risk management equipment:   - Apply yellow socks and bracelet for high fall risk patients  - Consider moving patient to room near nurses station  1/30/2022 1758 by Linda Rico RN  Outcome: Completed  1/30/2022 0800 by Linda Rico RN  Outcome: Progressing

## 2022-01-30 NOTE — ASSESSMENT & PLAN NOTE
Home meds:  Lisinopril 10 milligram    Plan  -As per Neurology cold permissive hypertension x 24 hours  -resume lisinopril tomorrow

## 2022-01-30 NOTE — ASSESSMENT & PLAN NOTE
PMH of prior CVA in 2015 with residual weakness 4/5 in  LUE/LLE (compliant with daily ASA and plavix ),

## 2022-01-30 NOTE — DISCHARGE INSTR - AVS FIRST PAGE
Take Aspirin 81mg daily by mouth  Take Brilinta 90mg by mouth twice daily    Take Lisinopril 10mg daily    Followup with your primary care doctor in 1 week and discuss especially about blood pressure control  Followup with a stroke specialist in 4 weeks to talk about your stroke    Please followup with Physical therapy to work with your strength

## 2022-01-30 NOTE — ASSESSMENT & PLAN NOTE
Presented to ED on 01/29 at 9 a m  due to dysarthria, numbness and tingling left side of face  Did not receive tPA on presentation as patient was nearly back to baseline  Imaging including MRI, CT head and CTA unremarkable for acute stroke      Plan  · A/p as per primary team (neurology)  · Continue aspirin, Brilinta  · PT OT consult pending

## 2022-01-30 NOTE — ED ATTENDING ATTESTATION
1/29/2022  I saw and evaluated the patient  I have discussed the patient with the resident physician and agree with the resident's findings, assessment and plan as documented in the resident physician's note, unless otherwise documented below  All available laboratory and imaging studies were reviewed by myself  I was present for key portions of any procedure(s) performed by the resident and I was immediately available to provide assistance  I agree with the current assessment done in the Emergency Department  I have conducted an independent evaluation of this patient  Chief Complaint   Patient presents with   Celena Carter is a 80 y o  female with PMH of DM, HTN and stroke with residual LUE and LLE weakness presenting with slurred speech  Patient is Telugu-speaking, patient's daughter assist with interpretation, they decline interpretation service such as Cyraphone  Patient reports that her speech became challenging yesterday, however, she did not wish to go to the hospital and [place] anyone  Patient's daughter spoke to her this morning at 9:00 a m  And realized that her speech was both garbled and gibberish, not making sense  Yesterday in the evening, patient had no such difficulties  Patient has baseline left upper and lower extremity weakness from prior stroke in 2015  This appears to be baseline and is not worse than usual   No new facial weakness, no weakness in right upper lower extremity  No recent illnesses  No fevers or chills  No chest pain  Patient's daughter notes baseline shortness of breath due to underlying asthma  No nausea, vomiting, or diarrhea  Patient denies burning with urination  Patient has been taking Plavix and aspirin daily        REVIEW OF SYSTEMS    Constitutional: denies fevers, chills  Visual/Eyes: no changes in vision  HENT: no rhinorrhea, no sore throat  Cardiac: no chest pain  Respiratory: baseline shortness of breath in setting of asthma  GI: no abdominal pain, nausea, vomiting, or diarrhea  : no burning with urination  Heme/Onc: no easy bruising  Endocrine: known diabetes  Neuro: no new focal weakness or numbness, no headaches    Ten systems reviewed and negative unless otherwise noted in HPI and above    PAST MEDICAL HISTORY  Past Medical History:   Diagnosis Date    Diabetes mellitus ([facility] )     Hypertension     Stroke ([facility] )        SURGICAL HISTORY  Past Surgical History:   Procedure Laterality Date     SECTION      x2       FAMILY HISTORY  Family History   Problem Relation Age of Onset    Stroke Father         CURRENT MEDICATIONS  No current facility-administered medications on file prior to encounter       Current Outpatient Medications on File Prior to Encounter   Medication Sig    albuterol (VENTOLIN HFA) 90 mcg/act inhaler Inhale 2 puffs 2 (two) times a day    aspirin 325 mg tablet Take 325 mg by mouth    atorvastatin (LIPITOR) 40 mg tablet Take 40 mg by mouth    Blood Glucose Monitoring Suppl (ACCU-CHEK YOUSUF CONNECT) w/Device KIT 1 Device by Does not apply route daily    budesonide-formoterol (SYMBICORT) 80-4 5 MCG/ACT inhaler Inhale 1 puff 2 (two) times a day    butalbital-acetaminophen-caffeine (ESGIC PLUS) -40 MG per tablet Take 1 tablet by mouth every 4 (four) hours as needed for headaches    butalbital-acetaminophen-caffeine (FIORICET,ESGIC) -40 mg per tablet Take 1 tablet by mouth every 4 (four) hours    butalbital-acetaminophen-caffeine-codeine (FIORICET WITH CODEINE) -61-30 MG per capsule Take 1 capsule by mouth every 4 (four) hours as needed for headaches    clopidogrel (PLAVIX) 75 mg tablet Take 75 mg by mouth daily    folic acid (FOLVITE) 1 mg tablet Take 1 mg by mouth    glucose blood (ACCU-CHEK YOUSUF PLUS) test strip 1 strip by In Vitro route daily    Lancets MISC 1 each by Does not apply route daily    lisinopril (ZESTRIL) 10 mg tablet Take 10 mg by mouth  metFORMIN (GLUCOPHAGE-XR) 750 mg 24 hr tablet Take 750 mg by mouth       ALLERGIES  No Known Allergies    SOCIAL HISTORY  Social History     Socioeconomic History    Marital status: /Civil Union     Spouse name: None    Number of children: None    Years of education: None    Highest education level: None   Occupational History    None   Tobacco Use    Smoking status: Never Smoker    Smokeless tobacco: None   Substance and Sexual Activity    Alcohol use: No    Drug use: No    Sexual activity: None   Other Topics Concern    None   Social History Narrative    None     Social Determinants of Health     Financial Resource Strain: Not on file   Food Insecurity: Not on file   Transportation Needs: Not on file   Physical Activity: Not on file   Stress: Not on file   Social Connections: Not on file   Intimate Partner Violence: Not on file   Housing Stability: Not on file       PHYSICAL EXAM  BP (!) 177/74   Pulse 78   Temp 97 9 °F (36 6 °C)   Resp 18   SpO2 98%   Vital signs and nursing notes reviewed    CONSTITUTIONAL: female appearing stated age resting in bed, initially tachypneic but this is improved during evaluation  HEENT: atraumatic, normocephalic  Sclera anicteric, conjunctiva are not injected  Moist oral mucosa  CARDIOVASCULAR/CHEST: RRR, no M/R/G  2+ radial pulses  PULMONARY: Breathing comfortably on RA  Breath sounds are equal and clear to auscultation, no wheezing  ABDOMEN: non-distended  BS present, normoactive  Non-tender  MSK: moves all extremities, no deformities, no peripheral edema, no calf asymmetry  NEURO: Awake, alert, and oriented x 3  Pupils 2 mm and reactive  Extraocular movements are intact, face appears grossly symmetric, right upper and right lower extremity are 5/5 strength, left upper and left lower extremity 4/5 strength  Patient does have some expressive aphasia  She has no pronator drift     SKIN: Warm, appears well-perfused  MENTAL STATUS: Normal affect    NIHSS Baseline:   Level of Consciousness 0  LOC Questions 0  LOC Commands 0  Best Gaze 0  Visual 0  Facial Palsy 0  Motor Arm, Left 1  Motor Arm, Right 0  Motor Leg, Left 1  Motor Leg, Right 0  Limb Ataxia 0  Sensory 0  Best Language 1  Dysarthria 0  Extinction and Inattention 0  Total: 3    DIAGNOSTIC STUDIES  Results Reviewed     Procedure Component Value Units Date/Time    P2Y12 Platelet inhibitor [[id number]]  (Normal) Collected: 01/29/22 1722    Lab Status: Final result Specimen: Blood from Arm, Left Updated: 01/29/22 1758     P2Y12 268 PRU     Platelet count [[id number]]  (Normal) Collected: 01/29/22 1722    Lab Status: Final result Specimen: Blood from Arm, Left Updated: 01/29/22 1741     Platelets 221 Thousands/uL      MPV 12 5 fL     COVID/FLU/RSV - 2 hour TAT [[id number]]  (Normal) Collected: 01/29/22 1441    Lab Status: Final result Specimen: Nares from Nose Updated: 01/29/22 1632     SARS-CoV-2 Negative     INFLUENZA A PCR Negative     INFLUENZA B PCR Negative     RSV PCR Negative    Narrative:      FOR PEDIATRIC PATIENTS - copy/paste COVID Guidelines URL to browser: [email or web address]    SARS-CoV-2 assay is a Nucleic Acid Amplification assay intended for the  qualitative detection of nucleic acid from SARS-CoV-2 in nasopharyngeal  swabs  Results are for the presumptive identification of SARS-CoV-2 RNA  Positive results are indicative of infection with SARS-CoV-2, the virus  causing COVID-19, but do not rule out bacterial infection or co-infection  with other viruses  Laboratories within the [place] and its  territories are required to report all positive results to the appropriate  public health authorities  Negative results do not preclude SARS-CoV-2  infection and should not be used as the sole basis for treatment or other  patient management decisions   Negative results must be combined with  clinical observations, patient history, and epidemiological information  This test has not been FDA cleared or approved  This test has been authorized by FDA under an Emergency Use Authorization  (EUA)  This test is only authorized for the duration of time the  declaration that circumstances exist justifying the authorization of the  emergency use of an in vitro diagnostic tests for detection of SARS-CoV-2  virus and/or diagnosis of COVID-19 infection under section 564(b)(1) of  the Act, 21 U  S C  780GOI-8(P)(5), unless the authorization is terminated  or revoked sooner  The test has been validated but independent review by FDA  and CLIA is pending  Test performed using [employer] GeneXpert: This RT-PCR assay targets N2,  a region unique to SARS-CoV-2  A conserved region in the E-gene was chosen  for pan-Sarbecovirus detection which includes SARS-CoV-2  Urine Microscopic [[id number]]  (Abnormal) Collected: 01/29/22 1556    Lab Status: Final result Specimen: Urine, Clean Catch Updated: 01/29/22 1620     RBC, UA 2-4 /hpf      WBC, UA 10-20 /hpf      Epithelial Cells Moderate /hpf      Bacteria, UA Occasional /hpf      Hyaline Casts, UA None Seen /lpf     Urine culture [[id number]] Collected: 01/29/22 1556    Lab Status:  In process Specimen: Urine, Clean Catch Updated: 01/29/22 1620    Urine Macroscopic, POC [[id number]]  (Abnormal) Collected: 01/29/22 1556    Lab Status: Final result Specimen: Urine Updated: 01/29/22 1557     Color, UA Yellow     Clarity, UA Clear     pH, UA 6 0     Leukocytes, UA Negative     Nitrite, UA Negative     Protein, UA Negative mg/dl      Glucose, UA Negative mg/dl      Ketones, UA Negative mg/dl      Urobilinogen, UA 0 2 E U /dl      Bilirubin, UA Negative     Blood, UA Trace     Specific [place], UA 1 010    Narrative:      CLINITEK RESULT    HS Troponin 0hr (reflex protocol) [[id number]]  (Normal) Collected: 01/29/22 1428    Lab Status: Final result Specimen: Blood from Arm, Left Updated: 01/29/22 1500     hs TnI 0hr 3 ng/L Rayshawn Dyer [[id number]]  (Normal) Collected: 01/29/22 1428    Lab Status: Final result Specimen: Blood from Arm, Left Updated: 01/29/22 1456     Protime 13 1 seconds      INR 1 03    APTT [[id number]]  (Normal) Collected: 01/29/22 1428    Lab Status: Final result Specimen: Blood from Arm, Left Updated: 01/29/22 1456     PTT 32 seconds     Basic metabolic panel [[id number]]  (Abnormal) Collected: 01/29/22 1428    Lab Status: Final result Specimen: Blood from Arm, Left Updated: 01/29/22 1453     Sodium 140 mmol/L      Potassium 4 2 mmol/L      Chloride 108 mmol/L      CO2 29 mmol/L      ANION GAP 3 mmol/L      BUN 14 mg/dL      Creatinine 1 19 mg/dL      Glucose 203 mg/dL      Calcium 9 1 mg/dL      eGFR 46 ml/min/1 73sq m     Narrative:      [place] guidelines for Chronic Kidney Disease (CKD):     Stage 1 with normal or high GFR (GFR > 90 mL/min/1 73 square meters)    Stage 2 Mild CKD (GFR = 60-89 mL/min/1 73 square meters)    Stage 3A Moderate CKD (GFR = 45-59 mL/min/1 73 square meters)    Stage 3B Moderate CKD (GFR = 30-44 mL/min/1 73 square meters)    Stage 4 Severe CKD (GFR = 15-29 mL/min/1 73 square meters)    Stage 5 End Stage CKD (GFR <15 mL/min/1 73 square meters)  Note: GFR calculation is accurate only with a steady state creatinine    Fingerstick Glucose (POCT) [[id number]]  (Abnormal) Collected: 01/29/22 1440    Lab Status: Final result Updated: 01/29/22 1448     POC Glucose 196 mg/dl     CBC and Platelet [[id number]]  (Abnormal) Collected: 01/29/22 1428    Lab Status: Final result Specimen: Blood from Arm, Left Updated: 01/29/22 1437     WBC 10 88 Thousand/uL      RBC 4 39 Million/uL      Hemoglobin 13 0 g/dL      Hematocrit 43 1 %      MCV 98 fL      MCH 29 6 pg      MCHC 30 2 g/dL      RDW 13 4 %      Platelets 828 Thousands/uL      MPV 12 6 fL           CTA stroke alert (head/neck)   Final Result      No cervical or intracranial large vessel occlusion        Chronic intracranial MCA atherosclerotic disease  Anatomic variations of the anterior circulation and posterior circulation as described above  Findings were directly discussed with Norma Sullivan at 2:50 PM                         Workstation performed: [id number]         CT stroke alert brain   Final Result      No acute intracranial abnormality  Chronic ischemic changes as described above  If there is concern for acute on chronic white matter disease, follow-up MRI imaging is recommended  Findings were directly discussed with Norma Sullivan at 2:50 PM       Workstation performed: [id number]         XR chest 1 view portable    (Results Pending)   MRI brain wo contrast    (Results Pending)       PROCEDURES  ECG 12 Lead Documentation Only    Date/Time: 1/29/2022 2:50 PM  Performed by: Kervin Lee MD  Authorized by: Kervin Lee MD     Comments:      Normal sinus rhythm, ventricular rate 89, VT interval 160, QRS 72, , normal axis, no ST/T-wave changes to suggest ischemia, no STEMI, premature atrial contractions are present  No significant change from prior EKG dated 11/10/2016  ED COURSE  Medications   iohexol (OMNIPAQUE) 350 MG/ML injection (SINGLE-DOSE) 85 mL (85 mL Intravenous Given 1/29/22 152)     28-year-old female presenting with dysarthria  Last known normal was last night, patient is out of window for tPA  Vital signs reviewed, hypertensive, within normal limits otherwise  Neurology is at bedside to evaluate the patient immediately after stroke alert is called  NIHSS is 3  CT head/CT head and neck angiography obtained, with findings as above  Patient is already on statins and dual anti-platelet therapy  EKG obtained, as reviewed by me, as above  Labs reveal baseline renal function, mild leukocytosis of 10 88, baseline hemoglobin of 13, normal coags, negative for influenza/RSV/COVID-19 PCR  UA is not consistent with UTI    Chest x-ray to my review is without infiltrates to suggest pneumonia  Patient will be admitted to the hospital for further evaluation of her stroke/stroke-like symptoms      CLINICAL IMPRESSION  Final diagnoses:   TIA (transient ischemic attack)   Dysarthria   Stroke ([facility] )

## 2022-01-30 NOTE — PROGRESS NOTES
INTERNAL MEDICINE RESIDENCY PROGRESS NOTE     Name: Pat Casey   Age & Sex: 80 y o  female   MRN: [id number]  Unit/Bed#: [facility] 728-01   Encounter: [id number]  Team: SOD Team A    PATIENT INFORMATION     Name: Pat Casey   Age & Sex: 80 y o  female   MRN: [id number]  Hospital Stay Days: 0    ASSESSMENT/PLAN     Principal Problem:    Dysarthria due to acute stroke [facility])  Active Problems:    Stroke-like symptoms    Diabetes mellitus type 2, noninsulin dependent ([facility] )    History of CVA (cerebrovascular accident)    Left-sided weakness    Mild intermittent asthma without complication    Morbid obesity ([facility] )    Vertigo    Migraine    Primary hypertension      Stroke-like symptoms  Assessment & Plan  Presented to ED on 01/29 at 9 a m  due to dysarthria, numbness and tingling left side of face  Did not receive tPA on presentation as patient was nearly back to baseline  Imaging including MRI, CT head and CTA unremarkable for acute stroke  Plan  · A/p as per primary team (neurology)  · Continue aspirin, Brilinta  · PT OT consult pending    Diabetes mellitus type 2, noninsulin dependent [facility])  Assessment & Plan  Lab Results   Component Value Date    HGBA1C 6 3 (H) 01/30/2022     Home meds: metformin 750 mg QD    Recent Labs     01/29/22  2116 01/30/22  0212 01/30/22  0710 01/30/22  1044   POCGLU 130 116 116 199*       Blood Sugar Average: Last 72 hrs:  (P) 151 4     Plan:  -SSI  -goal blood glucose 140-180 in patient    Primary hypertension  Assessment & Plan  Home meds:  Lisinopril 10 milligram    Plan  -As per Neurology cold permissive hypertension x 24 hours  -resume lisinopril tomorrow      History of CVA (cerebrovascular accident)  Assessment & Plan  PMH of prior CVA in 2015 with residual weakness 4/5 in  LUE/LLE (compliant with daily ASA and plavix ),       Disposition:  Continue inpatient care  PT OT recommendations prior to discharge planning    SUBJECTIVE     Patient seen and examined   No acute events overnight  States that she is back to her baseline  Aside from residual known left upper and lower extremities deficits, she has no complaints at this time  Dysarthria resolved      OBJECTIVE     Vitals:    22 0600 22 0654 22 0743 22 1054   BP: 145/79 150/76  (!) 171/81   Pulse: 90 89  79   Resp:  18  14   Temp:  98 2 °F (36 8 °C)     SpO2: 96% 97% 97% 98%   Weight:       Height:          Temperature:   Temp (24hrs), Av 1 °F (36 7 °C), Min:97 9 °F (36 6 °C), Max:98 2 °F (36 8 °C)    Temperature: 98 2 °F (36 8 °C)  Intake & Output:  I/O     None        Weights:        Body mass index is 36 29 kg/m²  Weight (last 2 days)     Date/Time Weight    22 2300 90 (198 41)        Physical Exam  Constitutional:       Appearance: She is obese  Eyes:      Extraocular Movements: Extraocular movements intact  Conjunctiva/sclera: Conjunctivae normal       Pupils: Pupils are equal, round, and reactive to light  Cardiovascular:      Rate and Rhythm: Normal rate and regular rhythm  Abdominal:      General: Bowel sounds are normal  There is no distension  Tenderness: There is no abdominal tenderness  Musculoskeletal:      Right lower leg: No edema  Left lower leg: No edema  Neurological:      Mental Status: She is alert and oriented to person, place, and time  Cranial Nerves: Facial asymmetry present  No dysarthria  Sensory: Sensory deficit present  Motor: Weakness present  Comments: +4/5 LUE LLE strenght        LABORATORY DATA     Labs: I have personally reviewed pertinent reports    Results from last 7 days   Lab Units 22  0555 22  1722 22  1428   WBC Thousand/uL 11 85*  --  10 88*   HEMOGLOBIN g/dL 12 0  --  13 0   HEMATOCRIT % 39 2  --  43 1   PLATELETS Thousands/uL 244 231 292      Results from last 7 days   Lab Units 22  0555 22  1428   POTASSIUM mmol/L 3 9 4 2   CHLORIDE mmol/L 108 108   CO2 mmol/L 26 29 BUN mg/dL 12 14   CREATININE mg/dL 1 04 1 19   CALCIUM mg/dL 9 9 9 1              Results from last 7 days   Lab Units 01/29/22  1428   INR  1 03   PTT seconds 32               IMAGING & DIAGNOSTIC TESTING     Radiology Results: I have personally reviewed pertinent reports  MRI brain wo contrast    Result Date: 1/30/2022  Impression: White matter changes suggestive of chronic microangiopathy  No acute intracranial pathology  Chronic infarcts are noted as described  Focus of susceptibility artifact lateral aspect of the kwaku on the left may represent cavernoma  Chronic microhemorrhage from hypertension not excluded but less likely as this is a solitary finding  Workstation performed: [id number]     CT stroke alert brain    Result Date: 1/29/2022  Impression: No acute intracranial abnormality  Chronic ischemic changes as described above  If there is concern for acute on chronic white matter disease, follow-up MRI imaging is recommended  Findings were directly discussed with Sundeep Campbell at 2:50 PM  Workstation performed: [id number]     CTA stroke alert (head/neck)    Result Date: 1/29/2022  Impression: No cervical or intracranial large vessel occlusion  Chronic intracranial MCA atherosclerotic disease  Anatomic variations of the anterior circulation and posterior circulation as described above  Findings were directly discussed with Sundeep Campbell at 2:50 PM  Workstation performed: [id number]     Other Diagnostic Testing: I have personally reviewed pertinent reports      ACTIVE MEDICATIONS     Current Facility-Administered Medications   Medication Dose Route Frequency    albuterol (PROVENTIL HFA,VENTOLIN HFA) inhaler 2 puff  2 puff Inhalation Q6H PRN    aspirin chewable tablet 81 mg  81 mg Oral Daily    atorvastatin (LIPITOR) tablet 40 mg  40 mg Oral QPM    budesonide-formoterol (SYMBICORT) 80-4 5 MCG/ACT inhaler 1 puff  1 puff Inhalation BID    enoxaparin (LOVENOX) subcutaneous injection 40 mg  40 mg Subcutaneous Daily    folic acid (FOLVITE) tablet 1 mg  1 mg Oral Daily    insulin lispro (HumaLOG) 100 units/mL subcutaneous injection 1-5 Units  1-5 Units Subcutaneous TID AC    ticagrelor (BRILINTA) tablet 90 mg  90 mg Oral Q12H [facility]       VTE Pharmacologic Prophylaxis: Enoxaparin (Lovenox)  VTE Mechanical Prophylaxis: sequential compression device    Portions of the record may have been created with voice recognition software  Occasional wrong word or "sound a like" substitutions may have occurred due to the inherent limitations of voice recognition software    Read the chart carefully and recognize, using context, where substitutions have occurred   ==  Annamaria Ibarra, [address]  Internal Medicine Residency PGY-1

## 2022-01-30 NOTE — PLAN OF CARE
Problem: MOBILITY - ADULT  Goal: Maintain or return to baseline ADL function  Description: INTERVENTIONS:  -  Assess patient's ability to carry out ADLs; assess patient's baseline for ADL function and identify physical deficits which impact ability to perform ADLs (bathing, care of mouth/teeth, toileting, grooming, dressing, etc )  - Assess/evaluate cause of self-care deficits   - Assess range of motion  - Assess patient's mobility; develop plan if impaired  - Assess patient's need for assistive devices and provide as appropriate  - Encourage maximum independence but intervene and supervise when necessary  - Involve family in performance of ADLs  - Assess for home care needs following discharge   - Consider OT consult to assist with ADL evaluation and planning for discharge  - Provide patient education as appropriate  Outcome: Progressing  Goal: Maintains/Returns to pre admission functional level  Description: INTERVENTIONS:  - Perform BMAT or MOVE assessment daily    - Set and communicate daily mobility goal to care team and patient/family/caregiver  - Collaborate with rehabilitation services on mobility goals if consulted  - Perform Range of Motion  times a day  - Reposition patient every  hours    - Dangle patient  times a day  - Stand patient  times a day  - Ambulate patient  times a day  - Out of bed to chair  times a day   - Out of bed for meals  times a day  - Out of bed for toileting  - Record patient progress and toleration of activity level   Outcome: Progressing     Problem: Prexisting or High Potential for Compromised Skin Integrity  Goal: Skin integrity is maintained or improved  Description: INTERVENTIONS:  - Identify patients at risk for skin breakdown  - Assess and monitor skin integrity  - Assess and monitor nutrition and hydration status  - Monitor labs   - Assess for incontinence   - Turn and reposition patient  - Assist with mobility/ambulation  - Relieve pressure over bony prominences  - Avoid friction and shearing  - Provide appropriate hygiene as needed including keeping skin clean and dry  - Evaluate need for skin moisturizer/barrier cream  - Collaborate with interdisciplinary team   - Patient/family teaching  - Consider wound care consult   Outcome: Progressing     Problem: PAIN - ADULT  Goal: Verbalizes/displays adequate comfort level or baseline comfort level  Description: Interventions:  - Encourage patient to monitor pain and request assistance  - Assess pain using appropriate pain scale  - Administer analgesics based on type and severity of pain and evaluate response  - Implement non-pharmacological measures as appropriate and evaluate response  - Consider cultural and social influences on pain and pain management  - Notify physician/advanced practitioner if interventions unsuccessful or patient reports new pain  Outcome: Progressing     Problem: INFECTION - ADULT  Goal: Absence or prevention of progression during hospitalization  Description: INTERVENTIONS:  - Assess and monitor for signs and symptoms of infection  - Monitor lab/diagnostic results  - Monitor all insertion sites, i e  indwelling lines, tubes, and drains  - Monitor endotracheal if appropriate and nasal secretions for changes in amount and color  - [place] appropriate cooling/warming therapies per order  - Administer medications as ordered  - Instruct and encourage patient and family to use good hand hygiene technique  - Identify and instruct in appropriate isolation precautions for identified infection/condition  Outcome: Progressing  Goal: Absence of fever/infection during neutropenic period  Description: INTERVENTIONS:  - Monitor WBC    Outcome: Progressing     Problem: SAFETY ADULT  Goal: Maintain or return to baseline ADL function  Description: INTERVENTIONS:  -  Assess patient's ability to carry out ADLs; assess patient's baseline for ADL function and identify physical deficits which impact ability to perform ADLs (bathing, care of mouth/teeth, toileting, grooming, dressing, etc )  - Assess/evaluate cause of self-care deficits   - Assess range of motion  - Assess patient's mobility; develop plan if impaired  - Assess patient's need for assistive devices and provide as appropriate  - Encourage maximum independence but intervene and supervise when necessary  - Involve family in performance of ADLs  - Assess for home care needs following discharge   - Consider OT consult to assist with ADL evaluation and planning for discharge  - Provide patient education as appropriate  Outcome: Progressing  Goal: Maintains/Returns to pre admission functional level  Description: INTERVENTIONS:  - Perform BMAT or MOVE assessment daily    - Set and communicate daily mobility goal to care team and patient/family/caregiver  - Collaborate with rehabilitation services on mobility goals if consulted  - Perform Range of Motion  times a day  - Reposition patient every  hours    - Dangle patient  times a day  - Stand patient  times a day  - Ambulate patient  times a day  - Out of bed to chair  times a day   - Out of bed for meals  times a day  - Out of bed for toileting  - Record patient progress and toleration of activity level   Outcome: Progressing  Goal: Patient will remain free of falls  Description: INTERVENTIONS:  - Educate patient/family on patient safety including physical limitations  - Instruct patient to call for assistance with activity   - Consult OT/PT to assist with strengthening/mobility   - Keep Call bell within reach  - Keep bed low and locked with side rails adjusted as appropriate  - Keep care items and personal belongings within reach  - Initiate and maintain comfort rounds  - Make Fall Risk Sign visible to staff  - Offer Toileting every  Hours, in advance of need  - Initiate/Maintain alarm  - Obtain necessary fall risk management equipment:  - Apply yellow socks and bracelet for high fall risk patients  - Consider moving patient to room near nurses station  Outcome: Progressing     Problem: DISCHARGE PLANNING  Goal: Discharge to home or other facility with appropriate resources  Description: INTERVENTIONS:  - Identify barriers to discharge w/patient and caregiver  - Arrange for needed discharge resources and transportation as appropriate  - Identify discharge learning needs (meds, wound care, etc )  - Arrange for interpretive services to assist at discharge as needed  - Refer to Case Management Department for coordinating discharge planning if the patient needs post-hospital services based on physician/advanced practitioner order or complex needs related to functional status, cognitive ability, or social support system  Outcome: Progressing     Problem: Knowledge Deficit  Goal: Patient/family/caregiver demonstrates understanding of disease process, treatment plan, medications, and discharge instructions  Description: Complete learning assessment and assess knowledge base  Interventions:  - Provide teaching at level of understanding  - Provide teaching via preferred learning methods  Outcome: Progressing     Problem: Neurological Deficit  Goal: Neurological status is stable or improving  Description: Interventions:  - Monitor and assess patient's level of consciousness, motor function, sensory function, and level of assistance needed for ADLs  - Monitor and report changes from baseline  Collaborate with interdisciplinary team to initiate plan and implement interventions as ordered  - Provide and maintain a safe environment  - Consider seizure precautions  - Consider fall precautions  - Consider aspiration precautions  - Consider bleeding precautions  Outcome: Progressing     Problem: Activity Intolerance/Impaired Mobility  Goal: Mobility/activity is maintained at optimum level for patient  Description: Interventions:  - Assess and monitor patient  barriers to mobility and need for assistive/adaptive devices    - Assess patient's emotional response to limitations  - Collaborate with interdisciplinary team and initiate plans and interventions as ordered  - Encourage independent activity per ability   - Maintain proper body alignment  - Perform active/passive rom as tolerated/ordered  - Plan activities to conserve energy   - Turn patient as appropriate  Outcome: Progressing     Problem: Communication Impairment  Goal: Ability to express needs and understand communication  Description: Assess patient's communication skills and ability to understand information  Patient will demonstrate use of effective communication techniques, alternative methods of communication and understanding even if not able to speak  - Encourage communication and provide alternate methods of communication as needed  - Collaborate with case management/ for discharge needs  - Include patient/family/caregiver in decisions related to communication  Outcome: Progressing     Problem: Potential for Aspiration  Goal: Non-ventilated patient's risk of aspiration is minimized  Description: Assess and monitor vital signs, respiratory status, and labs (WBC)  Monitor for signs of aspiration (tachypnea, cough, rales, wheezing, cyanosis, fever)  - Assess and monitor patient's ability to swallow  - Place patient up in chair to eat if possible  - HOB up at 90 degrees to eat if unable to get patient up into chair   - Supervise patient during oral intake  - Instruct patient/ family to take small bites  - Instruct patient/ family to take small single sips when taking liquids  - Follow patient-specific strategies generated by speech pathologist   Outcome: Progressing     Problem: Nutrition  Goal: Nutrition/Hydration status is improving  Description: Monitor and assess patient's nutrition/hydration status for malnutrition (ex- brittle hair, bruises, dry skin, pale skin and conjunctiva, muscle wasting, smooth red tongue, and disorientation)   Collaborate with interdisciplinary team and initiate plan and interventions as ordered  Monitor patient's weight and dietary intake as ordered or per policy  Utilize nutrition screening tool and intervene per policy  Determine patient's food preferences and provide high-protein, high-caloric foods as appropriate  - Assist patient with eating   - Allow adequate time for meals   - Encourage patient to take dietary supplement as ordered  - Collaborate with clinical nutritionist   - Include patient/family/caregiver in decisions related to nutrition    Outcome: Progressing

## 2022-01-30 NOTE — ASSESSMENT & PLAN NOTE
Pt had 3 strokes in 2015 with residual left-sided weakness and numbness, and right inferior quadrantanopsia  She has been on DAPT with aspirin and plavix ever since    - see above for ongoing plan for stroke prevention

## 2022-01-30 NOTE — SPEECH THERAPY NOTE
Speech-Language Pathology Bedside Swallow Evaluation      Patient Name: Olga Jones    PSNAJ'Q Date: 2022     Problem List  Principal Problem:    Dysarthria due to acute stroke [facility])  Active Problems:    History of CVA (cerebrovascular accident)    Left-sided weakness    Mild intermittent asthma without complication    Morbid obesity ([facility] [place] [address] )    Vertigo    Diabetes mellitus type 2, noninsulin dependent ([facility] [place] [address] )    Migraine    Primary hypertension    Stroke-like symptoms      Past Medical History  Past Medical History:   Diagnosis Date    [facility]     Hypertension     Stroke [facility])        Past Surgical History  Past Surgical History:   Procedure Laterality Date     SECTION      x2       Summary   Pt presented with functional appearing oral and pharyngeal stage swallowing skills with materials administered today  The patient has some left side facial weakness and numbness, but tolerates puree and regular solids with thin liquids well  No s/s aspiration observed  Patient admitted on stroke pathway with dysarthria  She continues with some slurred speech at times  Would benefit from formal evaluation, if able during admission, or with outpatient if symptoms continue at discharge  Risk/s for Aspiration: low     Recommended Diet: regular diet and thin liquids   Recommended Form of Meds: whole with liquid   Aspiration precautions and swallowing strategies: small bites/sips and alternating bites and sips    Current Medical Status  HPI: Olga Jones is a 80 y o  right handed female with history of HTN, DM, and multiple prior strokes (3 known acute strokes in 2015 per available records) with residual left sided weakness and numbness and presumed right-sided visual deficit (on chronic DAPT with aspirin and plavix), who presented as a stroke alert with dysarthria that was first noted when she woke up this morning  Last known normal the night prior before going to bed    Per pt's daughter, pt's  reported that pt was having slurred speech when she first woke up around 9am   He apparently tried to get her to go to the hospital to be evaluated by she refused  Eventually he called their daughter, who when she heard pt's speech, immediately determined she needed to go to the hospital   She did note that pt had more difficulty walking than usual on the way to the car  When the patient was being evaluated in the ED, pt's daughter reported that pt's speech was actually much improved from earlier when she had first heard it, but that is was still slurred, and sounded like a child having trouble annunciating properly when learning how to speak  Current Precautions:  Fall    Allergies:  No known food allergies  Past medical history:  Please see H&P for details    Special Studies:  MRI 1/29/2022: White matter changes suggestive of chronic microangiopathy  No acute intracranial pathology  Chronic infarcts are noted as described    Focus of susceptibility artifact lateral aspect of the kwaku on the left may represent cavernoma  Chronic microhemorrhage from hypertension not excluded but less likely as this is a solitary finding  Social/Education/Vocational Hx:  Pt lives with family    Swallow Information   Current Risks for Dysphagia & Aspiration: CVA and dysarthria  Current Symptoms/Concerns: none observed   Current Diet: NPO   Baseline Diet: regular diet and thin liquids    Baseline Assessment   Behavior/Cognition: alert  Speech/Language Status: able to participate in conversation and able to follow commands  Patient Positioning: upright in bed  Pain Status/Interventions/Response to Interventions: No report of or nonverbal indications of pain       Swallow Mechanism Exam  Facial: left facial droop  Labial: decreased ROM left side  Lingual: WFL  Velum: unable to visualize  Mandible: adequate ROM  Dentition: adequate  Vocal quality:clear/adequate   Volitional Cough: strong/productive Consistencies Assessed and Performance   Consistencies Administered: thin liquids, puree and hard solids  Materials administered included applesauce and toast with thin liquids     Oral Stage: WFL  Mastication was adequate with the materials administered today  Bolus formation and transfer were functional with no significant oral residue noted  No overt s/s reduced oral control  Pharyngeal Stage: WFL  Swallow Mechanics:  Swallowing initiation appeared prompt  Laryngeal rise was observed and judged to be within functional limits  No coughing, throat clearing, change in vocal quality or respiratory status noted today  Esophageal Concerns: none reported    Summary and Recommendations (see above)    Results Reviewed with: patient, RN and MD     Treatment Recommended: evaluation only   Will complete formal speech/language eval while in acute care, as able

## 2022-01-30 NOTE — ASSESSMENT & PLAN NOTE
Assessment:  80 y o  right handed female with history of HTN, DM, and multiple prior strokes with residual left sided weakness and numbness and presumed right-sided visual deficit (on chronic DAPT with aspirin and plavix), who presented as a stroke on 1/29/2022 with dysarthria that was first noted when she woke up around 9am, but she did not present to the ED until 2:20pm and was therefore not a candidate for tPA  She was also not a candidate for tPA due to her symptoms being mild and significantly improved since they began  Etiology of dysarthria is uncertain, with the differential including TIA/stroke (not localizable), or recrudescence of prior stroke symptoms, which pt's daughter confirmed pt has had previously  Workup:  CT head wo 1/29/2022:  No acute findings  Numerous chronic strokes  CTA head/neck 1/29/2022: No acute findings, no LVO or critical stenosis, scattered atherosclerosis of varying severity seen throughout the intracranial vessels, including ACAs and MCAs  MRI brain 1/29/2022: Negative for acute intracranial abnormalities; focus of susceptibility on left lateral kwaku noted which may be a cavernoma    P2Y12 assay normal at 268    Impression: Patient continues to have dysarthria despite MRI brain showing no strokes  Because of continued dysarthria, patient most likely has dysarthria from a MRI negative stroke  At this point, sole new symptom of dysarthria difficult to localize but seems most likely a small vessel stroke due to patient's htn (Patient has a history of uncontrolled blood pressures despite blood pressure control) and symptom wise fitting the effects on the deep brain structures  Plan:  - admit to neurology service with SOD consulting  - stroke pathway  - continue pt's home aspirin 81mg daily  - check P2Y12 and then load with Brilinta 180mg followed by 90mg BID  If P2Y12 is low, could consider changing to pletal instead    - continue home lipitor 40mg daily (consider increasing if LDL >70)  - MRI brain wo, echo, lipid panel, HbA1c  - permissive HTN to 220/110 for 24 hours, monitor on telemetry  - normothermia, euglycemia  - avoid hypotonic fluids  - frequent neurochecks per stroke pathway protocol  Contact neurology and obtain STAT CT head wo and consider CTA head/neck for significant change in exam      For discharge continue aspirin 81mg daily and Brilinta 90mg BID for patient and followup with Neurovascular outpatient in 4 weeks

## 2022-01-30 NOTE — PHYSICAL THERAPY NOTE
Physical Therapy Evaluation    Patient Name: Mitul Chaparro    AKWZQ'D Date: 2022     Problem List  Principal Problem:    Dysarthria due to acute stroke [facility])  Active Problems:    History of CVA (cerebrovascular accident)    Left-sided weakness    Mild intermittent asthma without complication    Morbid obesity ([facility] [address] )    Vertigo    Diabetes mellitus type 2, noninsulin dependent ([facility] )    Migraine    Primary hypertension    Stroke-like symptoms       Past Medical History  Past Medical History:   Diagnosis Date    Diabetes mellitus ([facility] )     Hypertension     Stroke [facility])         Past Surgical History  Past Surgical History:   Procedure Laterality Date     SECTION      x2     actual eval time: 920-940     22 0840   PT Last Visit   PT Visit Date 22   Note Type   Note type Evaluation   Pain Assessment   Pain Assessment Tool 0-10   Pain Score No Pain   Restrictions/Precautions   Weight Bearing Precautions Per Order No   Other Precautions Chair Alarm;Cognitive; Bed Alarm; Fall Risk  (L residual weakness and numbness (UE and LE))   Home Living   Type of [facility] One level;Elevator;Able to live on main level with bedroom/bathroom; Performs ADLs on one level  (6th floor apt, 0 CHAU, elevator )   Bathroom Shower/Tub Tub/shower unit   Bathroom Toilet Standard   Bathroom Equipment Grab bars in shower;Grab bars around toilet; Shower chair   Home Equipment Walker;Quad cane  (rollator)   Additional Comments pt reports ambulating with quad cane in house and using rollator in community PTA   Prior Function   Level of [place] Needs assistance with ADLs and functional mobility; Needs assistance with IADLs   Lives With Spouse   Receives Help From Family   ADL Assistance Needs assistance   IADLs Needs assistance   Falls in the last 6 months 0   Comments pt reports living with spouse PTA   Pt and spouse both recieve A from pt's daughter 7x/wk from 8am-7pm for ADLS/IADLS  General   Additional Pertinent History pt with PMH of prior strokes (2015) which resulted in residual L sided weakness and numbness  Per chart review, pt also has R visual deficits (R inferior quadrantanopsia)   Family/Caregiver Present No   Cognition   Overall Cognitive Status WFL   Arousal/Participation Cooperative   Orientation Level Oriented X4   Memory Within functional limits   Following Commands Follows one step commands without difficulty   Comments pleasant to work with   RLE Assessment   RLE Assessment X   Strength RLE   RLE Overall Strength 4+/5   LLE Assessment   LLE Assessment X   Strength LLE   LLE Overall Strength 4/5  (mild L tone noted-pt reports baseline)   Vision-Basic Assessment   Patient Visual Report   (R inferior quadrantanopsia )   Coordination   Rapid Alternating Movements Intact   Light Touch   RLE Light Touch Grossly intact   LLE Light Touch Impaired   Bed Mobility   Supine to Sit 5  Supervision   Additional items Increased time required;Verbal cues   Sit to Supine Unable to assess   Transfers   Sit to Stand 4  Minimal assistance   Additional items Assist x 1; Increased time required   Stand to Sit 4  Minimal assistance   Additional items Assist x 1; Increased time required   Stand pivot 4  Minimal assistance   Additional items Assist x 1; Increased time required   Additional Comments c quad cane vs RW   Ambulation/Elevation   Gait pattern Improper Weight shift;Decreased foot clearance; Short stride; Excessively slow;Decreased L stance   Gait Assistance 4  Minimal assist   Additional items Assist x 1;Verbal cues   Assistive Device Small base quad cane;Rolling walker   Distance 50'x2 +20'x2  (c Quad cane +c RW)   Stair Management Assistance Not tested   Ambulation/Elevation Additional Comments increased steadiness with RW, slow gait speed noted   Pt reports slower than her baseline   Balance   Static Sitting Fair +   Dynamic Sitting Fair   Static Standing Fair -   Dynamic Standing Poor +   Ambulatory Poor +   Endurance Deficit   Endurance Deficit Yes   Endurance Deficit Description fatigue   Activity Tolerance   Activity Tolerance Patient tolerated treatment well   Medical Staff Made Aware co-eval with OT, [place] 2* medical complexity and comorbidities   Nurse Made Aware pt cleared to see and mobilize per nursing    Assessment   Prognosis Fair   Problem List Decreased strength;Decreased endurance; Impaired balance;Decreased mobility; Decreased coordination; Impaired tone; Impaired vision   Assessment Pt is a 80 y o  female admitted to [facility] on 1/29/2022 with dysarthria and L facial numbness and tingling  Pt received a primary medical dx of dysarthria due to acute stroke  Imaging revealed no acute strokes  Pt has the following comorbidities which affect their treatment:h/o multiple strokes (2015), residual L sided weakness/numbness as well as personal factors including limited support at night after daughter leaves  Pt has a high complexity clinical presentation due to Ongoing medical management for primary dx, Increased reliance on more restrictive AD compared to baseline, Decreased activity tolerance compared to baseline, Fall risk, Increased assistance needed from caregiver at current time, Trending lab values, Continuous pulse oximetry monitoring  , and PMH  PT was consulted to evaluate pt's functional mobility and discharge needs  Upon evaluation, patient required S for bed mobility, minAx1 for STS transfers, minAx1 for ambulation  VC for proper use of quad cane and safety throughout  Pt's functional impairments include: increased LLE tone, decreased strength, balance, endurance, activity tolerance, and mobility  At conclusion of eval, pt remained seated in chair with chair alarm, phone, call bell, and all other personal needs within reach  Pt would benefit from skilled PT to address their functional mobility limitations   The patient's AM-PAC Basic Mobility Inpatient Short Form Raw Score is  18  A Raw score of greater than 16 suggests the patient may benefit from discharge to home  Please also refer to the recommendation of the Physical Therapist for safe discharge planning  D/C recommendations are home with OPPT and increased support from daughter  Pt reports her limitations are residual from her chronic strokes  She states her only new symptoms are minor L facial numbness and improve dysarthria  (reason for admission)  Pt reports she is moving slower than her baseline and will continue to benefit from PT during her hospital stay  Goals   Patient Goals to go home   STG Expiration Date 02/13/22   Short Term Goal #1 In 14 days, pt will: 1) perform bed mobility with mod I to promote functional independence  2) perform transfers with mod I to promote functional independence and decrease caregiver burden  3) ambulate 150' with mod I and least restrictive device to promote safe return to home environment  4) improve balance grades by 1/2 to promote safety with functional mobility  5) improve strength grades by 1/2 to promote safety with functional mobility  PT Treatment Day 0   Plan   Treatment/Interventions Functional transfer training;LE strengthening/ROM; Therapeutic exercise; Endurance training;Patient/family training;Equipment eval/education; Bed mobility;Gait training;Spoke to nursing;OT   PT Frequency 3-5x/wk   Recommendation   PT Discharge Recommendation Home with outpatient rehabilitation  (and increased support from daughter )   Equipment Recommended [facility] Recommended Wheeled walker   Nickie Cavazos [phone]   Turning in Bed Without Bedrails 3   Lying on Back to Sitting on Edge of Flat Bed 3   Moving Bed to Chair 3   Standing Up From Chair 3   Walk in Room 3   Climb 3-5 Stairs 3   Basic Mobility Inpatient Raw Score 18   Basic Mobility Standardized Score 41 05   Highest Level Of Mobility   -[facility] Goal 6: Walk 10 steps or more   -HL Highest Level of Mobility 7: Walk 25 feet or more   Modified [place] Scale   Modified [place] Scale 3   Sky Auguste, PT

## 2022-01-30 NOTE — PLAN OF CARE
Problem: PHYSICAL THERAPY ADULT  Goal: Performs mobility at highest level of function for planned discharge setting  See evaluation for individualized goals  Description: Treatment/Interventions: Functional transfer training,LE strengthening/ROM,Therapeutic exercise,Endurance training,Patient/family training,Equipment eval/education,Bed mobility,Gait training,Spoke to nursing,OT  Equipment Recommended: Shaniqua Socks       See flowsheet documentation for full assessment, interventions and recommendations  Note: Prognosis: Fair  Problem List: Decreased strength,Decreased endurance,Impaired balance,Decreased mobility,Decreased coordination,Impaired tone,Impaired vision  Assessment: Pt is a 80 y o  female admitted to [facility] on 1/29/2022 with dysarthria and L facial numbness and tingling  Pt received a primary medical dx of dysarthria due to acute stroke  Imaging revealed no acute strokes  Pt has the following comorbidities which affect their treatment:h/o multiple strokes (2015), residual L sided weakness/numbness as well as personal factors including limited support at night after daughter leaves  Pt has a high complexity clinical presentation due to Ongoing medical management for primary dx, Increased reliance on more restrictive AD compared to baseline, Decreased activity tolerance compared to baseline, Fall risk, Increased assistance needed from caregiver at current time, Trending lab values, Continuous pulse oximetry monitoring  , and PMH  PT was consulted to evaluate pt's functional mobility and discharge needs  Upon evaluation, patient required S for bed mobility, minAx1 for STS transfers, minAx1 for ambulation  VC for proper use of quad cane and safety throughout  Pt's functional impairments include: increased LLE tone, decreased strength, balance, endurance, activity tolerance, and mobility   At conclusion of eval, pt remained seated in chair with chair alarm, phone, call bell, and all other personal needs within reach  Pt would benefit from skilled PT to address their functional mobility limitations  The patient's AM-PAC Basic Mobility Inpatient Short Form Raw Score is  18  A Raw score of greater than 16 suggests the patient may benefit from discharge to home  Please also refer to the recommendation of the Physical Therapist for safe discharge planning  D/C recommendations are home with OPPT and increased support from daughter  Pt reports her limitations are residual from her chronic strokes  She states her only new symptoms are minor L facial numbness and improve dysarthria  (reason for admission)  Pt reports she is moving slower than her baseline and will continue to benefit from PT during her hospital stay  PT Discharge Recommendation: Home with outpatient rehabilitation (and increased support from daughter )          See flowsheet documentation for full assessment

## 2022-01-30 NOTE — ASSESSMENT & PLAN NOTE
Lab Results   Component Value Date    HGBA1C 6 3 (H) 01/30/2022     Home meds: metformin 750 mg QD    Recent Labs     01/29/22  2116 01/30/22  0212 01/30/22  0710 01/30/22  1044   POCGLU 130 116 116 199*       Blood Sugar Average: Last 72 hrs:  (P) 151 4     Plan:  -SSI  -goal blood glucose 140-180 in patient

## 2022-01-30 NOTE — DISCHARGE SUMMARY
NEUROLOGY RESIDENCY - DISCHARGE SUMMARY     Name: Cl Hinds   Age & Sex: 80 y o  female   MRN: [id number]  [facility]   Encounter: [id number]    Discharging Resident Physician: Kt Levin MD  Attending: Selma Patino MD  PCP: BRET Lemon  Admission Date: 1/29/2022  Discharge Date: 01/30/22    Cl Hinds will need follow up in in 4 weeks with neurovascular AP or attending  She will not require outpatient neurological testing  ASSESSMENT & PLAN     Primary hypertension  Assessment & Plan  - permissive HTN for 24 hours  Holding home lisinopril for now  After discharge, patient can start back on lisinopril 10mg tomorrow    Diabetes mellitus type 2, noninsulin dependent ([facility] [address] )  Assessment & Plan  History of DM2 on metformin   - hold metformin while inpatient and because she received IV contrast  - Fingersticks and sliding scale insulin TIDAC    Recent Labs     01/29/22  1440   POCGLU 196*       Blood Sugar Average: Last 72 hrs:  (P) 196    Mild intermittent asthma without complication  Assessment & Plan  - Symbicort BID  - Albuterol inhaler PRN    History of CVA (cerebrovascular accident)  Assessment & Plan  Pt had 3 strokes in 2015 with residual left-sided weakness and numbness, and right inferior quadrantanopsia  She has been on DAPT with aspirin and plavix ever since  - see above for ongoing plan for stroke prevention    * Dysarthria due to acute stroke [facility])  Assessment & Plan  Assessment:  80 y o  right handed female with history of HTN, DM, and multiple prior strokes with residual left sided weakness and numbness and presumed right-sided visual deficit (on chronic DAPT with aspirin and plavix), who presented as a stroke on 1/29/2022 with dysarthria that was first noted when she woke up around 9am, but she did not present to the ED until 2:20pm and was therefore not a candidate for tPA    She was also not a candidate for tPA due to her symptoms being mild and significantly improved since they began  Etiology of dysarthria is uncertain, with the differential including TIA/stroke (not localizable), or recrudescence of prior stroke symptoms, which pt's daughter confirmed pt has had previously  Workup:  CT head wo 1/29/2022:  No acute findings  Numerous chronic strokes  CTA head/neck 1/29/2022: No acute findings, no LVO or critical stenosis, scattered atherosclerosis of varying severity seen throughout the intracranial vessels, including ACAs and MCAs  MRI brain 1/29/2022: Negative for acute intracranial abnormalities; focus of susceptibility on left lateral kwaku noted which may be a cavernoma    P2Y12 assay normal at 268    Impression: Patient continues to have dysarthria despite MRI brain showing no strokes  Because of continued dysarthria, patient most likely has dysarthria from a MRI negative stroke  At this point, sole new symptom of dysarthria difficult to localize but seems most likely a small vessel stroke due to patient's htn (Patient has a history of uncontrolled blood pressures despite blood pressure control) and symptom wise fitting the effects on the deep brain structures  Plan:  - admit to neurology service with SOD consulting  - stroke pathway  - continue pt's home aspirin 81mg daily  - check P2Y12 and then load with Brilinta 180mg followed by 90mg BID  If P2Y12 is low, could consider changing to pletal instead  - continue home lipitor 40mg daily (consider increasing if LDL >70)  - MRI brain wo, echo, lipid panel, HbA1c  - permissive HTN to 220/110 for 24 hours, monitor on telemetry  - normothermia, euglycemia  - avoid hypotonic fluids  - frequent neurochecks per stroke pathway protocol  Contact neurology and obtain STAT CT head wo and consider CTA head/neck for significant change in exam      For discharge continue aspirin 81mg daily and Brilinta 90mg BID for patient and followup with Neurovascular outpatient in 4 weeks             Disposition: Home w/ outpatient rehab    Reason for Admission: Dysarthria    Consultations During Hospital Stay:  · Internal Medicine    Procedures Performed:     · None    Significant Findings / Test Results:     · Hem A1C 6 3%  · LDL 31  XR chest 1 view portable    Result Date: 1/30/2022  Impression: No acute cardiopulmonary disease  Workstation performed: [id number]     MRI brain wo contrast    Result Date: 1/30/2022  Impression: White matter changes suggestive of chronic microangiopathy  No acute intracranial pathology  Chronic infarcts are noted as described  Focus of susceptibility artifact lateral aspect of the kwaku on the left may represent cavernoma  Chronic microhemorrhage from hypertension not excluded but less likely as this is a solitary finding  Workstation performed: [id number]     CT stroke alert brain    Result Date: 1/29/2022  Impression: No acute intracranial abnormality  Chronic ischemic changes as described above  If there is concern for acute on chronic white matter disease, follow-up MRI imaging is recommended  Findings were directly discussed with Sundeep Campbell at 2:50 PM  Workstation performed: [id number]     CTA stroke alert (head/neck)    Result Date: 1/29/2022  Impression: No cervical or intracranial large vessel occlusion  Chronic intracranial MCA atherosclerotic disease  Anatomic variations of the anterior circulation and posterior circulation as described above  Findings were directly discussed with Sundeep Campbell at 2:50 PM  Workstation performed: [id number]       Incidental Findings:   · See above     Test Results Pending at Discharge (will require follow up): · None     Outpatient Tests Requested:  · None    Complications:  None    Hospital Course:     Mitul Chaparro is a 80 y o  female patient who originally presented to the hospital on 1/29/2022 due to stroke alert for sudden onset dysarthria when patient woke up in the morning 9am  Last known well the night before on 1/28/2022  When patient arrived in ED, patient had improved slurred speech  Blood pressure was 174/79 initially but fluctuated to 183/86 and 139/62  Patient has been on long term aspirin and plavix for stroke prevention  tPA was not given due to unclear onset outside time window and symptoms were nondisabling at the time, NIHSS was 3 but only 1 point was due to new deficits  Workup was done with MRI brain showing no stroke, CTA head and neck showing no cervical nor intracranial large vessel occlusion, CT head showed no acute intracranial abnormalities  TTE showed EF 70% with normal atria and trivial pericardial effusion  Patient on 1/30/2022 stated having improved dysarthria but not back to baseline with continued residual right sided deficits from past stroke admissions  Patient's MRI brain did not show signs of acute stroke but due to continued dysarthria, patient believed to have MRI negative stroke suspecting small vessel due to patient's symptoms  Patient was cleared to go home and adjusted her AP agents to aspirin 81mg daily and brilinta 90mg BID (P2Y12 normal and thought to be plavix failure) for stroke prevention  Patient to followup with Neurovascular in 4 weeks and PCP in 1 week (especially about continued blood pressure control)  Lisinopril 10mg daily to restart tomorrow morning for blood pressure control  Condition at Discharge: good     Discharge Day Visit / Exam:     Subjective:  Patient states having resolving dysarthria  Patient states having residual numbness of left face, left arm, left leg but states having no problems with ambulation, eating, weakness  Patient very eager to go home today      Vitals: Blood Pressure: 169/59 (01/30/22 1516)  Pulse: 81 (01/30/22 1516)  Temperature: 98 °F (36 7 °C) (01/30/22 1516)  Temp Source: Oral (01/30/22 1516)  Respirations: 14 (01/30/22 1516)  Height: 5' 2" (157 5 cm) (01/30/22 1449)  Weight - Scale: 89 8 kg (198 lb) (01/30/22 1449)  SpO2: 97 % (01/30/22 1516)    Exam:     Physical Exam  Eyes:      Extraocular Movements: EOM normal       Pupils: Pupils are equal, round, and reactive to light  Neurological:      Mental Status: She is oriented to person, place, and time  Coordination: Finger-Nose-Finger Test normal       Deep Tendon Reflexes: Strength normal       Reflex Scores:       Tricep reflexes are 2+ on the right side and 2+ on the left side  Bicep reflexes are 2+ on the right side and 2+ on the left side  Brachioradialis reflexes are 2+ on the right side and 2+ on the left side  Patellar reflexes are 1+ on the right side and 1+ on the left side  Achilles reflexes are 1+ on the right side and 1+ on the left side  Psychiatric:         Speech: Speech normal          Neurologic Exam     Mental Status   Oriented to person, place, and time  Oriented to person  Oriented to place  Oriented to time  Oriented to year, month and date  Registration: recalls 3 of 3 objects  Attention: normal  Concentration: normal    Speech: speech is normal   Level of consciousness: alert  Knowledge: good  Cranial Nerves     CN II   Visual fields full to confrontation  CN III, IV, VI   Pupils are equal, round, and reactive to light  Extraocular motions are normal    Right pupil: Shape: regular  Reactivity: brisk  Consensual response: intact  Left pupil: Shape: regular  Reactivity: brisk  Consensual response: intact  Nystagmus: none   Diplopia: none    CN V   Right facial sensation deficit: none  Left facial sensation deficit: complete    CN VII   Facial expression full, symmetric  CN IX, X   CN IX normal    CN X normal      CN XI   CN XI normal      Motor Exam   Muscle bulk: normal  Overall muscle tone: normal  Right arm tone: normal  Left arm tone: normal  Right arm pronator drift: absent  Right leg tone: normal  Left leg tone: normal    Strength   Strength 5/5 throughout       Sensory Exam   Right arm light touch: normal  Left arm light touch: decreased from elbow  Right leg light touch: normal  Left leg light touch: decreased from knee  Right arm pinprick: normal  Left arm pinprick: decreased from elbow  Right leg pinprick: normal  Left leg pinprick: decreased from knee    Gait, Coordination, and Reflexes     Coordination   Finger to nose coordination: normal    Reflexes   Right brachioradialis: 2+  Left brachioradialis: 2+  Right biceps: 2+  Left biceps: 2+  Right triceps: 2+  Left triceps: 2+  Right patellar: 1+  Left patellar: 1+  Right achilles: 1+  Left achilles: 1+  Right plantar: normal  Left plantar: normal      Discussion with Family: Discussed [place], daughter about discharge  Daughter to   Discharge instructions/Information to patient and family:   See after visit summary for information provided to patient and family  Provisions for Follow-Up Care:  See after visit summary for information related to follow-up care and any pertinent home health orders  Planned Readmission: None    Discharge Statement:  I spent 40 minutes discharging the patient  This time was spent on the day of discharge  I had direct contact with the patient on the day of discharge  Greater than 50% of the total time was spent examining patient, answering all patient questions, arranging and discussing plan of care with patient as well as directly providing post-discharge instructions  Additional time then spent on discharge activities  Discharge Medications:  See after visit summary for reconciled discharge medications provided to patient and family        ** Please Note: This note has been constructed using a voice recognition system **      ==  MD Deepika Kevin's Neurology Residency, PGY-2

## 2022-01-31 LAB — BACTERIA UR CULT: NORMAL

## 2022-01-31 NOTE — UTILIZATION REVIEW
Observation Admission Authorization Request   NOTIFICATION OF OBSERVATION ADMISSION/OBSERVATION AUTHORIZATION REQUEST   SERVICING FACILITY:   [facility]  Address: [address] [place] [address], [address] [place] [address]  Tax ID: [id number]  NPI: [id number]  Place of Service: On [address] [place] [address] Code: 22  CPT Code for Observation: CPT   CPT 39457     ATTENDING PROVIDER:  Attending Name and NPI#: Ramona Torres Md [[id number]]  Address: [address] [place] [address], [address] [place] [address]  Phone: [phone]     UTILIZATION REVIEW CONTACT:  [facility] Utilization   Network Utilization Review Department  Phone: [phone]  Fax: [phone]  Email: [email or web address]     PHYSICIAN ADVISORY SERVICES:  FOR NVOC-[id number] REVIEW - MEDICAL NECESSITY DENIAL  Phone: [phone]  Fax: [phone]  Email: [email or web address]     TYPE OF REQUEST:  Observation Status     ADMISSION INFORMATION:  ADMISSION DATE/TIME:   PATIENT DIAGNOSIS CODE/DESCRIPTION:  TIA (transient ischemic attack) [G45 9]  Slurred speech [R47 81]  Dysarthria [R47 1]  DISCHARGE DATE/TIME: 1/30/2022  8:24 PM  DISCHARGE DISPOSITION (IF DISCHARGED): Home/Self Care     IMPORTANT INFORMATION:  Please contact the [facility] directly with any questions or concerns regarding this request  Department voicemails are confidential     Send requests for admission clinical reviews, concurrent reviews, approvals, and administrative denials due to lack of clinical to fax [phone]

## 2022-01-31 NOTE — UTILIZATION REVIEW
Initial Clinical Review    Admission: Date/Time/Statement:   Admission Orders (From admission, onward)     Ordered        01/29/22 1618  Place in Observation  Once                      Orders Placed This Encounter   Procedures    Place in Observation     Standing Status:   Standing     Number of Occurrences:   1     Order Specific Question:   Level of Care     Answer:   Med Surg [16]     Order Specific Question:   Bed request comments     Answer:   telemetry     ED Arrival Information     Expected Arrival Acuity    - 1/29/2022 14:11 Emergent         Means of arrival Escorted by Service Admission type    [facility] Member Neurology Emergency         Arrival complaint    slurred speech         Chief Complaint   Patient presents with    STROKE Alert    Slurred Speech       Initial Presentation: 80 y o   female with history of HTN, DM, and multiple prior strokes (3 known acute strokes in 2015 per available records) with residual left sided weakness and numbness and presumed right-sided visual deficit (on chronic DAPT with aspirin and Plavix), who presented to the ED as a stroke alert with dysarthria that was first noted when she woke up this morning  Last known normal the night prior before going to bed  Per pt's daughter, pt's  reported that pt was having slurred speech when she first woke up around 9am   He apparently tried to get her to go to the hospital to be evaluated, but she refused  Eventually he called their daughter, who when she heard pt's speech, immediately determined she needed to go to the hospital   She did note that pt had more difficulty walking than usual on the way to the car  When the patient was being evaluated in the ED, pt's daughter reported that pt's speech was improved, but that is was still slurred  Physical exam: alert and oriented x3  Pt with dysarthria w/o aphasia  4/5 strength left UE and LLE  Mild ataxia in left UE       Plan: Admit to Observation for evaluation and treatment of dysarthria due to acute stroke:  Stroke Pathway with neuro checks, telemetry  MRI brain, ECHO, lipid panel, A1c  Continue home aspirin, check P2Y12, then load with Brilinta 180 mg followed by 90mg  Permissive HTN      1/29 Internal Medicine consult: Patient admits to numbness/ tingling in left side of face  Daughter states that pt still has some slurred speech  On exam, pt has LUE/LLE weakness 4/5 strength which is pt's baseline  Permissive hypertension, per Neurology, lisinopril held  A1c 6 3, continue sliding scale  1/30 Speech Bedside Swallow Evaluation: The patient has some left side facial weakness and numbness, but tolerates puree and regular solids with thin liquids well  No s/s aspiration observed  She continues with some slurred speech at times  Would benefit from formal evaluation, if able during admission, or with outpatient if symptoms continue at discharge    Recommendation, regular diet with thin liquids  Physical Therapy recommendation, home with outpatient rehab and increased family support  1/30 Neurology Discharge Summary: 80 y o  female patient who originally presented to the hospital on 1/29/2022 due to stroke alert for sudden onset dysarthria when patient woke up in the morning 9am  Last known well the night before on 1/28/2022  When patient arrived in ED, patient had improved slurred speech  Blood pressure was 174/79 initially but fluctuated to 183/86 and 139/62  Patient has been on long term aspirin and plavix for stroke prevention  tPA was not given due to unclear onset outside time window and symptoms were nondisabling at the time, NIHSS was 3 but only 1 point was due to new deficits  Workup was done with MRI brain showing no stroke, CTA head and neck showing no cervical nor intracranial large vessel occlusion, CT head showed no acute intracranial abnormalities   TTE showed EF 70% with normal atria and trivial pericardial effusion   Patient on 1/30/2022 stated having improved dysarthria but not back to baseline with continued residual right sided deficits from past stroke admissions  Patient's MRI brain did not show signs of acute stroke but due to continued dysarthria, patient believed to have MRI negative stroke suspecting small vessel due to patient's symptoms  Patient was cleared to go home and adjusted her AP agents to aspirin 81mg daily and brilinta 90mg BID (P2Y12 normal and thought to be plavix failure) for stroke prevention  Patient to followup with Neurovascular in 4 weeks and PCP in 1 week (especially about continued blood pressure control)  Lisinopril 10mg daily to restart tomorrow morning for blood pressure control        ED Triage Vitals   Temperature Pulse Respirations Blood Pressure SpO2   01/29/22 1422 01/29/22 1422 01/29/22 1422 01/29/22 1422 01/29/22 1422   97 9 °F (36 6 °C) 102 18 (!) 174/79 98 %      Temp Source Heart Rate Source Patient Position - Orthostatic VS BP Location FiO2 (%)   01/30/22 1516 01/29/22 1430 01/29/22 1900 -- --   Oral Monitor Lying        Pain Score       01/30/22 0044       No Pain          Wt Readings from Last 1 Encounters:   01/30/22 89 8 kg (198 lb)     Additional Vital Signs:       Date/Time Temp Pulse Resp BP MAP (mmHg) SpO2 O2 Device   01/30/22 15:16:30 98 °F (36 7 °C) 81 14 169/59 96 97 % None (Room air)   01/30/22 1449 -- 81 -- 169/59 -- -- --   01/30/22 10:54:55 -- 79 14 171/81 Abnormal  111 98 % --   01/30/22 0743 -- -- -- -- -- 97 % None (Room air)   01/30/22 06:54:31 98 2 °F (36 8 °C) 89 18 150/76 101 97 % --   01/30/22 0600 -- 90 -- 145/79 101 96 % --   01/30/22 0500 -- 78 -- 148/80 103 95 % --   01/30/22 0400 -- 78 -- 166/72 103 95 % --   01/30/22 03:56:49 -- 78 -- 166/72 103 95 % --   01/30/22 0300 -- 71 -- 156/74 101 96 % --   01/30/22 0130 -- 80 -- 169/77 108 96 % --   01/30/22 0100 -- 82 -- 165/78 107 96 % --   01/30/22 00:47:57 -- 80 -- 151/75 100 96 % --   01/29/22 2300 -- 80 17 162/78 -- -- --   01/29/22 2200 -- 88 -- 166/84 -- -- --   01/29/22 2100 -- 80 -- 168/80 -- -- None (Room air)   01/29/22 2000 -- 78 18 177/74 Abnormal  -- 98 % None (Room air)   01/29/22 1900 -- 74 18 170/75 -- 99 % None (Room air)   01/29/22 1829 -- 90 20 155/75 -- 99 % None (Room air)   01/29/22 1600 -- 92 18 160/68 -- 99 % --   01/29/22 1530 -- 76 18 150/70 -- 98 % None (Room air)   01/29/22 1500 -- 86 18 141/61 -- 97 % None (Room air)   01/29/22 1445 -- 84 18 139/62 -- 97 % None (Room air)   01/29/22 1430 -- 89 18 183/86 Abnormal  -- 98 % None (Room air)       Date and Time Eye Opening Best Verbal Response Best Motor Response Delmis Coma Scale Score   01/30/22 1600 4 5 6 15   01/30/22 1200 4 5 6 15   01/30/22 0743 4 5 6 15   01/29/22 2100 4 5 6 15   01/29/22 2000 4 5 6 15   01/29/22 1900 4 5 6 15   01/29/22 1829 4 5 6 15   01/29/22 1600 4 5 6 15   01/29/22 1530 4 5 6 15   01/29/22 1500 4 5 6 15   01/29/22 1445 4 5 6 15   01/29/22 1430 4 5 6 15   01/29/22 1422 4 5 6 15       Pertinent Labs/Diagnostic Test Results:     1/30 ECHO:   Left Ventricle: Left ventricular cavity size is normal  The left ventricular ejection fraction is 70%  Systolic function is vigorous  Wall motion is normal  Diastolic function is mildly abnormal, consistent with grade I (abnormal) relaxation  Wall thickness is mildly increased  Pericardium: There is a trivial pericardial effusion    1/29 MRI brain: No acute intracranial pathology  There are chronic lacunar infarcts identified in the right thalamus and bilateral centrum semiovale  No evidence of acute infarct  White matter changes suggestive of chronic microangiopathy  Focus of susceptibility artifact lateral aspect of the kwaku on the left may represent cavernoma  Chronic microhemorrhage from hypertension not excluded but less likely as this is a solitary finding  1/28 CTA head and neck: No cervical or intracranial large vessel occlusion   Chronic intracranial MCA atherosclerotic disease      1/29 CT brain: No acute intracranial abnormality  Chronic ischemic changes as described above  If there is concern for acute on chronic white matter disease, follow-up MRI imaging is recommended  1/29 EKG:    Sinus rhythm with Premature atrial complexes  Nonspecific ST abnormality  Abnormal ECG  When compared with ECG of 10-NOV-2016 09:18,  Premature atrial complexes are now Present  Nonspecific T wave abnormality no longer evident in Inferior leads    1/29 CXR: No acute cardiopulmonary disease         Results from last 7 days   Lab Units 01/29/22  1441   SARS-COV-2  Negative     Results from last 7 days   Lab Units 01/30/22  0555 01/29/22  1722 01/29/22  1428   WBC Thousand/uL 11 85*  --  10 88*   HEMOGLOBIN g/dL 12 0  --  13 0   HEMATOCRIT % 39 2  --  43 1   PLATELETS Thousands/uL 244 231 292         Results from last 7 days   Lab Units 01/30/22  0555 01/29/22  1428   SODIUM mmol/L 140 140   POTASSIUM mmol/L 3 9 4 2   CHLORIDE mmol/L 108 108   CO2 mmol/L 26 29   ANION GAP mmol/L 6 3*   BUN mg/dL 12 14   CREATININE mg/dL 1 04 1 19   EGFR ml/min/1 73sq m 54 46   CALCIUM mg/dL 9 9 9 1         Results from last 7 days   Lab Units 01/30/22  1554 01/30/22  1044 01/30/22  0710 01/30/22  0212 01/29/22  2116 01/29/22  1440   POC GLUCOSE mg/dl 103 199* 116 116 130 196*     Results from last 7 days   Lab Units 01/30/22  0555 01/29/22  1428   GLUCOSE RANDOM mg/dL 118 203*         Results from last 7 days   Lab Units 01/30/22  0555   HEMOGLOBIN A1C % 6 3*   EAG mg/dl 134           Results from last 7 days   Lab Units 01/29/22  1428   HS TNI 0HR ng/L 3         Results from last 7 days   Lab Units 01/29/22  1428   PROTIME seconds 13 1   INR  1 03   PTT seconds 32       Results from last 7 days   Lab Units 01/29/22  1556   CLARITY UA  Clear   COLOR UA  Yellow   SPEC GRAV UA  1 010   PH UA  6 0   GLUCOSE UA mg/dl Negative   KETONES UA mg/dl Negative   BLOOD UA  Trace*   PROTEIN UA mg/dl Negative   NITRITE UA  Negative   BILIRUBIN UA  Negative UROBILINOGEN UA E U /dl 0 2   LEUKOCYTES UA  Negative   WBC UA /hpf 10-20*   RBC UA /hpf 2-4   BACTERIA UA /hpf Occasional   EPITHELIAL CELLS WET PREP /hpf Moderate*     Results from last 7 days   Lab Units 01/29/22  1441   INFLUENZA A PCR  Negative   INFLUENZA B PCR  Negative   RSV PCR  Negative             Results from last 7 days   Lab Units 01/29/22  1556   URINE CULTURE  80,000-89,000 cfu/ml                ED Treatment:   Medication Administration from 01/29/2022 1411 to 01/29/2022 2111       Date/Time Order Dose Route Action     01/29/2022 1432 iohexol (OMNIPAQUE) 350 MG/ML injection (SINGLE-DOSE) 85 mL 85 mL Intravenous Given     01/29/2022 1828 atorvastatin (LIPITOR) tablet 40 mg 40 mg Oral Not Given     01/29/2022 1722 ticagrelor (BRILINTA) tablet 180 mg 180 mg Oral Given        Past Medical History:   Diagnosis Date    Diabetes mellitus ([facility] )     Hypertension     Stroke ([facility] )      Present on Admission:   Morbid obesity ([facility] )   Mild intermittent asthma without complication   Left-sided weakness   Diabetes mellitus type 2, noninsulin dependent (HCC)   Vertigo   Primary hypertension   Stroke-like symptoms   Migraine   Dysarthria due to acute stroke [facility])      Admitting Diagnosis: TIA (transient ischemic attack) [G45 9]  Slurred speech [R47 81]  Dysarthria [R47 1]  Age/Sex: 80 y o  female       Admission Orders: Telemetry, baseline NIH stroke scale on admission, reassess NIH stroke scale Q24H x 2 days, Nursing dysphagia assessment prior to starting diet, neuro checks, SCD, PT/PT and Speech eval      Scheduled Medications:       Medication Dose Route Frequency    albuterol (PROVENTIL HFA,VENTOLIN HFA) inhaler 2 puff  2 puff Inhalation Q6H PRN    aspirin chewable tablet 81 mg  81 mg Oral Daily    atorvastatin (LIPITOR) tablet 40 mg  40 mg Oral QPM    budesonide-formoterol (SYMBICORT) 80-4 5 MCG/ACT inhaler 1 puff  1 puff Inhalation BID    enoxaparin (LOVENOX) subcutaneous injection 40 mg  40 mg Subcutaneous Daily    folic acid (FOLVITE) tablet 1 mg  1 mg Oral Daily    insulin lispro (HumaLOG) 100 units/mL subcutaneous injection 1-5 Units  1-5 Units Subcutaneous TID AC    ticagrelor (BRILINTA) tablet 90 mg  90 mg Oral Q12H [facility]                 IP CONSULT TO NEUROLOGY  IP CONSULT TO NUTRITION SERVICES  IP CONSULT TO SOD    Network Utilization Review Department  ATTENTION: Please call with any questions or concerns to [phone] and carefully listen to the prompts so that you are directed to the right person  All voicemails are confidential   Farrel Bodily all requests for admission clinical reviews, approved or denied determinations and any other requests to dedicated fax number below belonging to the campus where the patient is receiving treatment   List of dedicated fax numbers for the Facilities:  [address] DENIALS (Administrative/Medical Necessity) [phone]   [address] (Maternity/NICU/Pediatrics) [phone]   [address]  [address] [facility] [place] [address] [place] [address] [address]e [address] [facility] [place] [address] [phone]

## 2022-02-02 ENCOUNTER — TELEPHONE (OUTPATIENT)
Dept: NEUROLOGY | Facility: CLINIC | Age: 72
End: 2022-02-02

## 2022-02-02 NOTE — TELEPHONE ENCOUNTER
HFU/SLBE/ATT/Dysarthria due to acute stroke/DC1/30    Note from Chart:      Spike Elder will need follow up in in 4 weeks with neurovascular AP or attending  She will not require outpatient neurological testing          Spoke with Pt's Daughter Jose Guadalupe vela HFU appt with [facility] on 3/17/22 at 9:45 am

## 2022-04-28 ENCOUNTER — APPOINTMENT (OUTPATIENT)
Dept: LAB | Facility: HOSPITAL | Age: 72
End: 2022-04-28
Payer: COMMERCIAL

## 2022-04-28 DIAGNOSIS — E11.9 TYPE 2 DIABETES MELLITUS WITHOUT COMPLICATION, UNSPECIFIED WHETHER LONG TERM INSULIN USE (HCC): ICD-10-CM

## 2022-04-28 LAB
ALBUMIN SERPL BCP-MCNC: 3.5 G/DL (ref 3.5–5)
ALP SERPL-CCNC: 161 U/L (ref 46–116)
ALT SERPL W P-5'-P-CCNC: 42 U/L (ref 12–78)
ANION GAP SERPL CALCULATED.3IONS-SCNC: 6 MMOL/L (ref 4–13)
AST SERPL W P-5'-P-CCNC: 33 U/L (ref 5–45)
BASOPHILS # BLD AUTO: 0.06 THOUSANDS/ΜL (ref 0–0.1)
BASOPHILS NFR BLD AUTO: 1 % (ref 0–1)
BILIRUB SERPL-MCNC: 0.83 MG/DL (ref 0.2–1)
BUN SERPL-MCNC: 12 MG/DL (ref 5–25)
CALCIUM SERPL-MCNC: 8.9 MG/DL (ref 8.3–10.1)
CHLORIDE SERPL-SCNC: 105 MMOL/L (ref 100–108)
CHOLEST SERPL-MCNC: 98 MG/DL
CO2 SERPL-SCNC: 27 MMOL/L (ref 21–32)
CREAT SERPL-MCNC: 1.4 MG/DL (ref 0.6–1.3)
EOSINOPHIL # BLD AUTO: 0.26 THOUSAND/ΜL (ref 0–0.61)
EOSINOPHIL NFR BLD AUTO: 3 % (ref 0–6)
ERYTHROCYTE [DISTWIDTH] IN BLOOD BY AUTOMATED COUNT: 12.9 % (ref 11.6–15.1)
EST. AVERAGE GLUCOSE BLD GHB EST-MCNC: 128 MG/DL
GFR SERPL CREATININE-BSD FRML MDRD: 38 ML/MIN/1.73SQ M
GLUCOSE P FAST SERPL-MCNC: 149 MG/DL (ref 65–99)
HBA1C MFR BLD: 6.1 %
HCT VFR BLD AUTO: 41.2 % (ref 34.8–46.1)
HDLC SERPL-MCNC: 42 MG/DL
HGB BLD-MCNC: 12.8 G/DL (ref 11.5–15.4)
IMM GRANULOCYTES # BLD AUTO: 0.05 THOUSAND/UL (ref 0–0.2)
IMM GRANULOCYTES NFR BLD AUTO: 1 % (ref 0–2)
LDLC SERPL CALC-MCNC: 29 MG/DL (ref 0–100)
LYMPHOCYTES # BLD AUTO: 1.43 THOUSANDS/ΜL (ref 0.6–4.47)
LYMPHOCYTES NFR BLD AUTO: 14 % (ref 14–44)
MCH RBC QN AUTO: 29.9 PG (ref 26.8–34.3)
MCHC RBC AUTO-ENTMCNC: 31.1 G/DL (ref 31.4–37.4)
MCV RBC AUTO: 96 FL (ref 82–98)
MONOCYTES # BLD AUTO: 0.49 THOUSAND/ΜL (ref 0.17–1.22)
MONOCYTES NFR BLD AUTO: 5 % (ref 4–12)
NEUTROPHILS # BLD AUTO: 7.82 THOUSANDS/ΜL (ref 1.85–7.62)
NEUTS SEG NFR BLD AUTO: 76 % (ref 43–75)
NONHDLC SERPL-MCNC: 56 MG/DL
NRBC BLD AUTO-RTO: 0 /100 WBCS
PLATELET # BLD AUTO: 271 THOUSANDS/UL (ref 149–390)
PMV BLD AUTO: 12.2 FL (ref 8.9–12.7)
POTASSIUM SERPL-SCNC: 4.6 MMOL/L (ref 3.5–5.3)
PROT SERPL-MCNC: 8.5 G/DL (ref 6.4–8.2)
RBC # BLD AUTO: 4.28 MILLION/UL (ref 3.81–5.12)
SODIUM SERPL-SCNC: 138 MMOL/L (ref 136–145)
TRIGL SERPL-MCNC: 135 MG/DL
WBC # BLD AUTO: 10.11 THOUSAND/UL (ref 4.31–10.16)

## 2022-04-28 PROCEDURE — 80061 LIPID PANEL: CPT

## 2022-04-28 PROCEDURE — 36415 COLL VENOUS BLD VENIPUNCTURE: CPT

## 2022-04-28 PROCEDURE — 83036 HEMOGLOBIN GLYCOSYLATED A1C: CPT

## 2022-04-28 PROCEDURE — 85025 COMPLETE CBC W/AUTO DIFF WBC: CPT

## 2022-04-28 PROCEDURE — 80053 COMPREHEN METABOLIC PANEL: CPT

## 2022-04-29 ENCOUNTER — APPOINTMENT (OUTPATIENT)
Dept: LAB | Facility: HOSPITAL | Age: 72
End: 2022-04-29
Payer: COMMERCIAL

## 2022-08-06 DIAGNOSIS — R47.1 DYSARTHRIA DUE TO ACUTE STROKE (HCC): ICD-10-CM

## 2022-08-06 DIAGNOSIS — I63.9 DYSARTHRIA DUE TO ACUTE STROKE (HCC): ICD-10-CM

## 2022-08-07 RX ORDER — ASPIRIN 81 MG/1
TABLET, CHEWABLE ORAL
Qty: 90 TABLET | Refills: 0 | Status: SHIPPED | OUTPATIENT
Start: 2022-08-07

## 2022-09-30 ENCOUNTER — TELEPHONE (OUTPATIENT)
Dept: GASTROENTEROLOGY | Facility: CLINIC | Age: 72
End: 2022-09-30

## 2022-09-30 NOTE — TELEPHONE ENCOUNTER
LMOM in [place] advising we need to verify referring Dr  ( No referral on file) and also verify health insurance since both are coming up rejected

## 2022-10-11 ENCOUNTER — CONSULT (OUTPATIENT)
Dept: GASTROENTEROLOGY | Facility: CLINIC | Age: 72
End: 2022-10-11
Payer: COMMERCIAL

## 2022-10-11 VITALS
SYSTOLIC BLOOD PRESSURE: 111 MMHG | HEART RATE: 99 BPM | BODY MASS INDEX: 38.14 KG/M2 | HEIGHT: 61 IN | DIASTOLIC BLOOD PRESSURE: 72 MMHG | WEIGHT: 202 LBS | TEMPERATURE: 98.9 F

## 2022-10-11 DIAGNOSIS — Z11.59 ENCOUNTER FOR HEPATITIS C SCREENING TEST FOR LOW RISK PATIENT: ICD-10-CM

## 2022-10-11 DIAGNOSIS — Z12.11 SCREENING FOR COLON CANCER: Primary | ICD-10-CM

## 2022-10-11 PROCEDURE — 99244 OFF/OP CNSLTJ NEW/EST MOD 40: CPT | Performed by: INTERNAL MEDICINE

## 2022-10-11 RX ORDER — CLOPIDOGREL BISULFATE 75 MG/1
75 TABLET ORAL DAILY
COMMUNITY

## 2022-10-11 NOTE — PATIENT INSTRUCTIONS
Scheduled date of colonoscopy (as of today):  11/25/22  Physician performing colonoscopy: Dr Nicho Osorio  Location of colonoscopy: [facility]  Bowel prep reviewed with patient: golytely/dulcolax  Instructions reviewed with patient by: Felicita   Clearances:   Plavix - Dr Julius Lundberg

## 2022-10-11 NOTE — PROGRESS NOTES
[facility] - Outpatient Consultation  Alexandra 71 y o  female MRN: [id number]  Encounter: [id number]          ASSESSMENT AND PLAN:      1  Screening for colon cancer  - Patient is 71 y o , has not been screened for colon cancer in the past, denies any family history of GI malignancy or IBD, no alarm symptoms at this point, currently having normal bowel movements  - Currently average risk for colonoscopy, other options for colorectal cancer screening were discussed with the patient, will perform colonoscopy, risk and benefits of the procedure were discussed with the patient including but not limited to bleeding, infection, perforation and missing an adenoma  2  Encounter for hepatitis C screening test for low risk patient  Patient has not been screened for hepatitis-C in the past  Will perform hepatitis C antibody test  Patient is low risk    ______________________________________________________________________    HPI:  Patient seen and examined, she is a 78-year-old female patient with past medical history significant for diabetes, hypertension, previous stroke, she denies any recent events, currently is tolerating PO route, denies nausea or vomiting, is passing flatus and having daily bowel movements of normal consistency, denies abdominal pain, no recent weight loss      REVIEW OF SYSTEMS:    CONSTITUTIONAL: Denies any fever, chills, rigors, and weight loss  HEENT: No earache or tinnitus  Denies hearing loss or visual disturbances  CARDIOVASCULAR: No chest pain or palpitations  RESPIRATORY: Denies any cough, hemoptysis, shortness of breath or dyspnea on exertion  GASTROINTESTINAL: As noted in the History of Present Illness  GENITOURINARY: No problems with urination  Denies any hematuria or dysuria  NEUROLOGIC: No dizziness or vertigo, denies headaches  MUSCULOSKELETAL: Denies any muscle or joint pain  SKIN: Denies skin rashes or itching     ENDOCRINE: Denies excessive thirst  Denies intolerance to heat or cold  PSYCHOSOCIAL: Denies depression or anxiety  Denies any recent memory loss  Historical Information   Past Medical History:   Diagnosis Date   • Diabetes mellitus ([facility] )    • Hypertension    • Stroke [facility])      Past Surgical History:   Procedure Laterality Date   •  SECTION      x2     Social History   Social History     Substance and Sexual Activity   Alcohol Use No     Social History     Substance and Sexual Activity   Drug Use No     Social History     Tobacco Use   Smoking Status Never Smoker   Smokeless Tobacco Never Used     Family History   Problem Relation Age of Onset   • Stroke Father        Meds/Allergies       Current Outpatient Medications:   •  albuterol (PROVENTIL HFA,VENTOLIN HFA) 90 mcg/act inhaler  •  aspirin 81 mg chewable tablet  •  atorvastatin (LIPITOR) 40 mg tablet  •  bisacodyl (DULCOLAX) 5 mg EC tablet  •  Blood Glucose Monitoring Suppl (ACCU-CHEK YOUSUF CONNECT) w/Device KIT  •  budesonide-formoterol (SYMBICORT) 80-4 5 MCG/ACT inhaler  •  clopidogrel (PLAVIX) 75 mg tablet  •  folic acid (FOLVITE) 1 mg tablet  •  glucose blood test strip  •  Lancets MISC  •  lisinopril (ZESTRIL) 10 mg tablet  •  metFORMIN (GLUCOPHAGE-XR) 750 mg 24 hr tablet  •  polyethylene glycol (GOLYTELY) 4000 mL solution  •  ticagrelor (BRILINTA) 90 MG    No Known Allergies        Objective     Blood pressure 111/72, pulse 99, temperature 98 9 °F (37 2 °C), temperature source Tympanic, height 5' 1" (1 549 m), weight 91 6 kg (202 lb)  Body mass index is 38 17 kg/m²  PHYSICAL EXAM:      General Appearance:   Alert, cooperative, no distress   HEENT:   Normocephalic, atraumatic, anicteric      Neck:  Supple, symmetrical, trachea midline   Lungs:   Clear to auscultation bilaterally; no rales, rhonchi or wheezing; respirations unlabored    Heart[de-identified]   Regular rate and rhythm; no murmur, rub, or gallop     Abdomen:   Soft, non-tender, non-distended; normal bowel sounds; no masses, no organomegaly    Genitalia:   Deferred    Rectal:   Deferred    Extremities:  No cyanosis, clubbing or edema    Pulses:  2+ and symmetric    Skin:  No jaundice, rashes, or lesions    Lymph nodes:  No palpable cervical lymphadenopathy        Lab Results:   No visits with results within 1 Day(s) from this visit     Latest known visit with results is:   Appointment on 04/28/2022   Component Date Value   • Hemoglobin A1C 04/28/2022 6 1 (A)   • EAG 04/28/2022 128    • Cholesterol 04/28/2022 98    • Triglycerides 04/28/2022 135    • HDL, Direct 04/28/2022 42 (A)   • LDL Calculated 04/28/2022 29    • Non-HDL-Chol (CHOL-HDL) 04/28/2022 56    • Sodium 04/28/2022 138    • Potassium 04/28/2022 4 6    • Chloride 04/28/2022 105    • CO2 04/28/2022 27    • ANION GAP 04/28/2022 6    • BUN 04/28/2022 12    • Creatinine 04/28/2022 1 40 (A)   • Glucose, Fasting 04/28/2022 149 (A)   • Calcium 04/28/2022 8 9    • AST 04/28/2022 33    • ALT 04/28/2022 42    • Alkaline Phosphatase 04/28/2022 161 (A)   • Total Protein 04/28/2022 8 5 (A)   • Albumin 04/28/2022 3 5    • Total Bilirubin 04/28/2022 0 83    • eGFR 04/28/2022 38    • WBC 04/28/2022 10 11    • RBC 04/28/2022 4 28    • Hemoglobin 04/28/2022 12 8    • Hematocrit 04/28/2022 41 2    • MCV 04/28/2022 96    • MCH 04/28/2022 29 9    • MCHC 04/28/2022 31 1 (A)   • RDW 04/28/2022 12 9    • MPV 04/28/2022 12 2    • Platelets 33/23/0661 271    • nRBC 04/28/2022 0    • Neutrophils Relative 04/28/2022 76 (A)   • Immat GRANS % 04/28/2022 1    • Lymphocytes Relative 04/28/2022 14    • Monocytes Relative 04/28/2022 5    • Eosinophils Relative 04/28/2022 3    • Basophils Relative 04/28/2022 1    • Neutrophils Absolute 04/28/2022 7 82 (A)   • Immature Grans Absolute 04/28/2022 0 05    • Lymphocytes Absolute 04/28/2022 1 43    • Monocytes Absolute 04/28/2022 0 49    • Eosinophils Absolute 04/28/2022 0 26    • Basophils Absolute 04/28/2022 0 06          Radiology Results: No results found

## 2022-11-14 ENCOUNTER — TELEPHONE (OUTPATIENT)
Dept: ICU | Facility: HOSPITAL | Age: 72
End: 2022-11-14

## 2022-11-14 ENCOUNTER — TELEPHONE (OUTPATIENT)
Dept: GASTROENTEROLOGY | Facility: CLINIC | Age: 72
End: 2022-11-14

## 2022-11-14 NOTE — TELEPHONE ENCOUNTER
Our mutual patient is scheduled for procedure:  Colonoscopy    On: 11/25/2022     With: Dr Denilson Griffin is taking the following blood thinner: Plavix    Can this be stopped 5 days prior to the procedure?       Physician Approving clearance: ________________________

## 2022-11-17 NOTE — TELEPHONE ENCOUNTER
Dr Mcgarry Homes,     At this time patient is not cleared to hold her blood thinner  She does have a follow up with Neurology for 5/18/22  Should we reschedule procedure until after she is seen by neurology? Please advise  Thanks   Polly Bertrand

## 2022-11-17 NOTE — TELEPHONE ENCOUNTER
Spoke with patients daughter regarding rescheduling colonoscopy until after she is seen by neurology  Recall has been placed

## 2023-01-20 NOTE — PROGRESS NOTES
Speech-Language Pathology Initial Evaluation    Today's date: 2023   Patient’s name: Matthew Solomon  : 1950  MRN: [id number]  Safety measures: Hx of CVA - walks with a cane due to left sided weakness  Referring provider: Uriel Bermudez MD    Encounter Diagnosis     ICD-10-CM    1  Dysarthria  R47 1 Ambulatory referral to Speech Therapy      2  Dysarthria due to acute stroke [facility])  I63 9 Ambulatory referral to Speech Therapy    R47 1         Visit tracking:  -Referring provider: Epic  -Billing guidelines: CMS  -Visit #*1  -Insurance: [facility] (primary) and [address] [place] [address] (secondary)  -RE due 23    Subjective comments: The patient is pleasant upon arrival  She is Greek speaking but understands most [place]  She required only her daughter to interpret as needed  She declined a Greek interpretor  Her daughter reports that it has taken a year to get her into therapy due to patient's lack of interest in therapy  She was offered home health but she did not want it  She was not returning calls from schedulers  How did the patient hear about us? Physician    Patient's goal(s): She would like to improve her balance and walking  Reason for referral: Decreased speech intelligibility  Prior functional status: Communication effective and appropriate in all situations  Clinically complex situations: N/A    History: Patient is a 71 y o  female who was referred to outpatient skilled Speech Therapy services for a motor speech evaluation  22 - 22 [facility]): The patient was referred nearly a year ago (22) following a brief admission to the hospital for a suspected stroke  However, at the time of admission, no new infarct was noted  Patient presented with the symptoms of dysarthria, left sided weakness and suspected right visual field deficit  She had dealt with previous strokes  "Recrudescence from unknown cause" was suspected as the likely cause       During today's evaluation, the patient reports that word finding, memory, and swallowing are all WNL  Her only concern is her speech intellegiblilty  Her daughter reports that she is often having to repeat herself  Her daughter also reports that her mother has had multiple strokes  She's had multiple "small ones" and two major strokes  She has only experienced dysarthria following the most recent hospitalization in which a new stroke was not discovered based upon MRI  Overall, they feel she has slightly improved in her dysarthria over the past year  Her daughter also reports that she is losing her balance a lot  She walks with a cane (with four legs for balance)  They have been working with stretch bands, stress balls and foot peddles at home  The patient reports that she gets tired  Hearing: WFL  Vision: She wears reading glasses and has an upcoming eye doctor appointment as she has not see the opthalmologist in some time  Home environment/lifestyle: Lives with her   Highest level of education: 7th Grade  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 Absent   -Vocal quality Clear/adequate - patient feels that it is slightly more hoarse than it used to be  -Volitional cough Strong/productive   -Respiration WFL   -Drooling? Yes - only on occasion    -Tremor/involuntary movement? Not present   -Tracheostomy present?  No       Sustained phonation    -Vowel prolongation (norm=15-20 sec) 3 30 sec (on "Ah")       Diadochokinetic (DDK) Rates    -“puh-puh-puh” (norm=3 0-5 5 rep/1 sec) WFL   -“tuh-tuh-tuh” (norm=25 rep/10 sec) WFL   -“kuh-kuh-kuh” (norm=25 rep/10 sec) WFL   -“puh-tuh-kuh” (norm=8 rep/10 sec) WFL       Articulation    -Single syllable words 100% intelligibility   -Multisyllabic words 100% intelligibility   -Repetition of multisyllabic words 5x 95% intelligibility   -Words of progressive length 95% intelligibility   -Sentences 100% intelligibility   -Reading (“[place]”) 95% intelligibility   -Conversation 95% intelligibility       Counting    -1 to 20 Words spoken evenly with normal number of  breaths taken       Overall speech intelligibility rating 95% intelligible         Patient presents with very mild dysarthria characterized by fast rate when not thinking about intelligibility  Goals    N/A    Impressions/Recommendations    Impressions:   -Patient presents with very mild dysarthria  She is intelligible 95% of the time and is even more clear in [place] than [place] (as she was tested utilizing both [place] and [facility] [place] [place] assessment tools)  When she reminds herself to slow down and articulate she is 100% intelligible  As her condition is very mild and she is capable of utilizing compensatory strategies to improve intelligibility, no further speech therapy is recommended at this time  Plan of care was discussed with the patient and her daughter and they are in agreement with this  Gait imbalance was noted during today's evaluation  The patient's daughter expressed concern over this area of deficit and she would like to pursue a physical therapy evaluation  They were encouraged to request a referral by the patient's neurologist (whom she will be seeing soon), in order to set up the evaluation here at the [address] [place] [address] of Physical Therapy at [facility] [place] [facility]       Recommendations:  -Patient would benefit from outpatient skilled Speech Therapy services: N/A    -Frequency: No treatment is warranted at this time   -Duration: N/A    -Intervention certification from: 5/29/5827  -Intervention certification to: 9/86/6675    -Intervention comments:   48 minutes of motor speech evaluation time

## 2023-01-24 ENCOUNTER — EVALUATION (OUTPATIENT)
Dept: SPEECH THERAPY | Facility: CLINIC | Age: 73
End: 2023-01-24

## 2023-01-24 DIAGNOSIS — R47.1 DYSARTHRIA: Primary | ICD-10-CM

## 2023-01-24 DIAGNOSIS — R47.1 DYSARTHRIA DUE TO ACUTE STROKE (HCC): ICD-10-CM

## 2023-01-24 DIAGNOSIS — I63.9 DYSARTHRIA DUE TO ACUTE STROKE (HCC): ICD-10-CM

## 2023-03-08 ENCOUNTER — CONSULT (OUTPATIENT)
Dept: NEUROLOGY | Facility: CLINIC | Age: 73
End: 2023-03-08

## 2023-03-08 VITALS
HEIGHT: 61 IN | SYSTOLIC BLOOD PRESSURE: 102 MMHG | WEIGHT: 202.5 LBS | HEART RATE: 92 BPM | TEMPERATURE: 97.3 F | DIASTOLIC BLOOD PRESSURE: 68 MMHG | BODY MASS INDEX: 38.23 KG/M2

## 2023-03-08 DIAGNOSIS — E11.9 DIABETES MELLITUS TYPE 2, NONINSULIN DEPENDENT (HCC): ICD-10-CM

## 2023-03-08 DIAGNOSIS — G43.909 MIGRAINE, UNSPECIFIED, NOT INTRACTABLE, WITHOUT STATUS MIGRAINOSUS: ICD-10-CM

## 2023-03-08 DIAGNOSIS — Z86.73 HISTORY OF CVA (CEREBROVASCULAR ACCIDENT): Primary | ICD-10-CM

## 2023-03-08 DIAGNOSIS — I69.354 HEMIPLEGIA AND HEMIPARESIS FOLLOWING CEREBRAL INFARCTION AFFECTING LEFT NON-DOMINANT SIDE (HCC): ICD-10-CM

## 2023-03-08 DIAGNOSIS — I63.9 DYSARTHRIA DUE TO ACUTE STROKE (HCC): ICD-10-CM

## 2023-03-08 DIAGNOSIS — I69.322 DYSARTHRIA AS LATE EFFECT OF CEREBROVASCULAR ACCIDENT (CVA): ICD-10-CM

## 2023-03-08 DIAGNOSIS — R47.1 DYSARTHRIA DUE TO ACUTE STROKE (HCC): ICD-10-CM

## 2023-03-08 DIAGNOSIS — R53.1 LEFT-SIDED WEAKNESS: ICD-10-CM

## 2023-03-08 RX ORDER — AMLODIPINE BESYLATE 10 MG/1
10 TABLET ORAL DAILY
COMMUNITY
Start: 2022-12-27

## 2023-03-08 RX ORDER — LINAGLIPTIN AND METFORMIN HYDROCHLORIDE 2.5; 1 MG/1; MG/1
TABLET, FILM COATED, EXTENDED RELEASE ORAL
COMMUNITY
Start: 2023-01-19

## 2023-03-08 NOTE — PROGRESS NOTES
Patient ID: Yuni Greco is a 71 y o  female who presents to the [facility]  Assessment/Plan:   Patient Instructions   Stroke: Wendy Hawley presents with her family for a follow-up evaluation with regard to a remote history of stroke and a more recent TIA  They did not report new strokelike symptoms and she is taking her medication as prescribed  Her blood work looks very good  She does not report any side effects on her medication  They do report that her balance has been worse and she does clearly have worsened left-sided strength compared with her prior norm  Some of this may be related to deconditioning  At this time we do not have clear evidence of a new ischemic stroke however the progression of her symptoms is somewhat concerning  -For ongoing stroke prevention she should continue her combination of aspirin, Brilinta, Lipitor, and appropriate blood pressure and glycemic control  -We will defer to her primary care team for monitoring of her cholesterol panel and blood sugar numbers and they should continue to target a hemoglobin A1c of less than 7% and LDL cholesterol of less than 70  We would recommend against lowering her cholesterol medication  -She is not in need of repeat cerebrovascular imaging  -She would clearly benefit from a course of physical therapy for her left hand side and I wrote her that prescription today  -Note that the current plan for stroke prevention is for her to remain on dual antiplatelet therapy long-term because of her multiple prior strokes with intracranial stenosis/atherosclerosis  -She should continue to follow-up with the ophthalmologist to make sure that she does not experience any ocular complications related to diabetes  -She should occasionally check her pressure away from the doctor's office and should typically target numbers less than 130/80    If they are frequently higher than that she should contact her primary care team to consider an adjustment  -She should remain physically active inasmuch as she feels capable of doing so    I will plan for her to return to the office in 6 months time to see either myself or one of the ALYSSIA's but would be happy to see her sooner if the need should arise  If she has any symptoms concerning for TIA or stroke including sudden painless loss of vision or double vision, difficulty speaking or swallowing, vertigo/room spinning that does not quickly resolve, or weakness/numbness/loss of coordination affecting 1 side of the face or body she should proceed by ambulance to the nearest emergency room immediately  She is cleared from a neurology standpoint to have a colonoscopy performed at the discretion of the GI team   She could hold either aspirin or Brilinta or both at their discretion although we would recommend holding for as short a time as reasonable and resuming as quickly as possible after the procedure  Diagnoses and all orders for this visit:    History of CVA (cerebrovascular accident)  -     Ambulatory Referral to Physical Therapy; Future    Dysarthria due to acute stroke [facility])  -     Ambulatory Referral to Neurology    Migraine, unspecified, not intractable, without status migrainosus  -     Ambulatory Referral to Neurology    Diabetes mellitus type 2, noninsulin dependent ([facility] )    Dysarthria as late effect of cerebrovascular accident (CVA)    Left-sided weakness  -     Ambulatory Referral to Physical Therapy; Future    Hemiplegia and hemiparesis following cerebral infarction affecting left non-dominant side (HCC)    Other orders  -     amLODIPine (NORVASC) 10 mg tablet; Take 10 mg by mouth in the morning  -     Breo Ellipta 200-25 MCG/ACT inhaler  -     Jentadueto XR 2 5-1000 MG TB24      Subjective:    HPI    Wilfredo Benson is a 71 y o  woman who presents with her family for a follow-up with regard to a remote history of strokes    They report that her bigger stroke occurred something like 10 years ago and affected her left face and to a much less degree her left hand side  More recently she presented to the hospital approximately 1 year ago with dysarthric speech  At that time it was described that she had residual left-sided weakness to a degree from her prior strokes  She was admitted and evaluated with MRI which did not reveal any evidence of acute ischemic stroke  I personally reviewed the images and agree  This was most likely a transient ischemic attack  At the time she had P2 Y12 testing as she was already on a long-term combination of aspirin and Plavix  Reportedly the P2 Y12 was inappropriately normal suggesting she was a Plavix nonresponder  For that reason she was transitioned to [facility] along with her aspirin  She has remained on that combination without any significant bleeding or bruising issues  Her family reports some concern over worsening left sided weakness which is not improving, as well as worsening gait and balance      She has recently seen an ophthalmologist and has bilateral cataracts  Stroke risk factors were evaluated including:   Lab Results   Component Value Date/Time    CHOLESTEROL 98 04/28/2022 09:16 AM     Lab Results   Component Value Date/Time    TRIG 135 04/28/2022 09:16 AM    TRIG 209 06/12/2015 05:27 AM     Lab Results   Component Value Date/Time    HDL 42 (L) 04/28/2022 09:16 AM    HDL 36 06/12/2015 05:27 AM     Lab Results   Component Value Date/Time    LDLCALC 29 04/28/2022 09:16 AM    LDLCALC 100 06/12/2015 05:27 AM       Lab Results   Component Value Date/Time    HGBA1C 6 1 (H) 04/28/2022 09:16 AM    HGBA1C 5 4 04/24/2017 03:50 PM     Lab Results   Component Value Date/Time     04/28/2022 09:16 AM     06/12/2015 05:24 AM           Past Medical History:   Diagnosis Date   • Diabetes mellitus ([facility] )    • Hypertension    • Stroke ([facility] )        Current Outpatient Medications:   •  albuterol (PROVENTIL HFA,VENTOLIN HFA) 90 mcg/act inhaler, Inhale 2 puffs 2 (two) times a day, Disp: , Rfl:   •  amLODIPine (NORVASC) 10 mg tablet, Take 10 mg by mouth in the morning, Disp: , Rfl:   •  aspirin 81 mg chewable tablet, TAKE 1 TABLET (81 MG TOTAL) DAILY, Disp: 90 tablet, Rfl: 0  •  atorvastatin (LIPITOR) 40 mg tablet, Take 40 mg by mouth, Disp: , Rfl:   •  Breo Ellipta 200-25 MCG/ACT inhaler, , Disp: , Rfl:   •  budesonide-formoterol (SYMBICORT) 80-4 5 MCG/ACT inhaler, Inhale 1 puff 2 (two) times a day, Disp: , Rfl:   •  glucose blood test strip, 1 strip by In Vitro route daily, Disp: , Rfl:   •  Jentadueto XR 2 5-1000 MG TB24, , Disp: , Rfl:   •  Lancets MISC, 1 each by Does not apply route daily, Disp: , Rfl:   •  lisinopril (ZESTRIL) 10 mg tablet, Take 10 mg by mouth, Disp: , Rfl:   •  ticagrelor (BRILINTA) 90 MG, Take 1 tablet (90 mg total) by mouth every 12 (twelve) hours, Disp: 180 tablet, Rfl: 0  •  bisacodyl (DULCOLAX) 5 mg EC tablet, Take 4 tablets (20 mg total) by mouth once for 1 dose, Disp: 4 tablet, Rfl: 0  •  Blood Glucose Monitoring Suppl (ACCU-CHEK YOUSUF CONNECT) w/Device KIT, 1 Device by Does not apply route daily (Patient not taking: Reported on 3/8/2023), Disp: , Rfl:   •  folic acid (FOLVITE) 1 mg tablet, Take 1 mg by mouth (Patient not taking: Reported on 3/8/2023), Disp: , Rfl:   •  metFORMIN (GLUCOPHAGE-XR) 750 mg 24 hr tablet, Take 750 mg by mouth (Patient not taking: Reported on 3/8/2023), Disp: , Rfl:   •  polyethylene glycol (GOLYTELY) 4000 mL solution, Take 4,000 mL by mouth once for 1 dose, Disp: 4000 mL, Rfl: 0     Objective:    Blood pressure 102/68, pulse 92, temperature (!) 97 3 °F (36 3 °C), temperature source Temporal, height 5' 1" (1 549 m), weight 91 9 kg (202 lb 8 oz)  Neurological Exam    At the time of my examination she was awake, alert, and in no distress  There is a paucity of spontaneous speech although she does respond normally when asked direct questions  Her affect was appropriate    She is a moderate historian as much of the history is provided by her daughter  Cranial nerves II through XII were symmetrically intact except for decreased sensation on the left face and hearing in the left ear, also with both eyes open she appeared to have some decrease in the superior and inferior visual field on the right-hand side but this resolved with each eye tested individually  Motor testing reveals 5/5 strength in the right upper and lower extremity, 4/5 strength in the left upper extremity, particularly at the level of the triceps, and the left lower extremity is 4+/5  Sensation was diminished in the left arm and leg to temperature and vibration compared to the right-hand side  There was no obvious distal loss of temperature and vibration to suggest significant ataxia  She was able to rise without assistance  With the help of her cane her gait was stable but hemiparetic  There was no drift and finger-nose reveals no tremor, ataxia, or loss of proprioceptive function although there was a little bit of posturing of the left arm with ambulation  ROS:    Review of Systems   Constitutional: Negative  Negative for appetite change and fever  HENT: Negative  Negative for hearing loss, tinnitus, trouble swallowing and voice change  Eyes: Negative  Negative for photophobia, pain and visual disturbance  Respiratory: Negative  Negative for shortness of breath  Cardiovascular: Negative  Negative for palpitations  Gastrointestinal: Negative  Negative for nausea and vomiting  Endocrine: Negative  Negative for cold intolerance  Genitourinary: Negative  Negative for dysuria, frequency and urgency  Musculoskeletal: Positive for gait problem (walking is getting worse)  Negative for myalgias and neck pain  Skin: Negative  Negative for rash  Allergic/Immunologic: Negative      Neurological: Negative for dizziness, tremors, seizures, syncope, facial asymmetry, speech difficulty, weakness, light-headedness, numbness and headaches  Hematological: Negative  Does not bruise/bleed easily  Psychiatric/Behavioral: Negative  Negative for confusion, hallucinations and sleep disturbance         I have spent 42 minutes today on this case including chart review, performing history and exam, patient counseling, and documentation/communication

## 2023-03-08 NOTE — PATIENT INSTRUCTIONS
Stroke: Steve Julian presents with her family for a follow-up evaluation with regard to a remote history of stroke and a more recent TIA  They did not report new strokelike symptoms and she is taking her medication as prescribed  Her blood work looks very good  She does not report any side effects on her medication  They do report that her balance has been worse and she does clearly have worsened left-sided strength compared with her prior norm  Some of this may be related to deconditioning  At this time we do not have clear evidence of a new ischemic stroke however the progression of her symptoms is somewhat concerning  -For ongoing stroke prevention she should continue her combination of aspirin, Brilinta, Lipitor, and appropriate blood pressure and glycemic control  -We will defer to her primary care team for monitoring of her cholesterol panel and blood sugar numbers and they should continue to target a hemoglobin A1c of less than 7% and LDL cholesterol of less than 70  We would recommend against lowering her cholesterol medication  -She is not in need of repeat cerebrovascular imaging  -She would clearly benefit from a course of physical therapy for her left hand side and I wrote her that prescription today  -Note that the current plan for stroke prevention is for her to remain on dual antiplatelet therapy long-term because of her multiple prior strokes with intracranial stenosis/atherosclerosis  -She should continue to follow-up with the ophthalmologist to make sure that she does not experience any ocular complications related to diabetes  -She should occasionally check her pressure away from the doctor's office and should typically target numbers less than 130/80    If they are frequently higher than that she should contact her primary care team to consider an adjustment  -She should remain physically active inasmuch as she feels capable of doing so    I will plan for her to return to the office in 6 months time to see either myself or one of the ALYSSIA's but would be happy to see her sooner if the need should arise  If she has any symptoms concerning for TIA or stroke including sudden painless loss of vision or double vision, difficulty speaking or swallowing, vertigo/room spinning that does not quickly resolve, or weakness/numbness/loss of coordination affecting 1 side of the face or body she should proceed by ambulance to the nearest emergency room immediately  She is cleared from a neurology standpoint to have a colonoscopy performed at the discretion of the GI team   She could hold either aspirin or Brilinta or both at their discretion although we would recommend holding for as short a time as reasonable and resuming as quickly as possible after the procedure

## 2023-03-22 ENCOUNTER — EVALUATION (OUTPATIENT)
Dept: PHYSICAL THERAPY | Facility: CLINIC | Age: 73
End: 2023-03-22

## 2023-03-22 DIAGNOSIS — Z86.73 HISTORY OF CVA (CEREBROVASCULAR ACCIDENT): ICD-10-CM

## 2023-03-22 DIAGNOSIS — R53.1 LEFT-SIDED WEAKNESS: ICD-10-CM

## 2023-03-22 DIAGNOSIS — Z74.09 IMPAIRED FUNCTIONAL MOBILITY, BALANCE, GAIT, AND ENDURANCE: Primary | ICD-10-CM

## 2023-03-22 NOTE — PROGRESS NOTES
PT Evaluation     Today's date: 3/23/2023  Patient name: Erwin Aponte  : 1950  MRN: [id number]  Referring provider: Gilford Mohair, MD  Dx:   Encounter Diagnosis     ICD-10-CM    1  Impaired functional mobility, balance, gait, and endurance  Z74 09       2  Left-sided weakness  R53 1 Ambulatory Referral to Physical Therapy      3  History of CVA (cerebrovascular accident)  Z86 73 Ambulatory Referral to Physical Therapy                     Assessment  Assessment details: Jane Parker is a 71year old female who presents to outpatient physical therapy with CVA affecting L side in 2022, did not receive therapy post  She presents with L>R LE weakness, impaired balance, impaired gait, impaired endurance, decreased functional mobility and requires assistance with most tasks  She demonstrates decreased gait speed of 0 51m/s, below cut-off score of 1 0m/s for fall risk, as well as below cut-off of 0 6m/s which places her at increased risk of requiring assistance for ADL's and IADL's, is more likely to be hospitalized, and places her in a limited community mobility category  She demonstrates significantly decreased endurance, which is also limited by her asthma, and was unable to complete 2 minute walk test, needed to rest after walking maximum of 130 feet  This impaired endurance significantly limits her ability to perform activities outside of household ambulation  She is at increased risk of falls from TUG score of 16 2 seconds without AD (required CGA for balance), and above cut-off score of 13 5 seconds  5xSTS also demonstrates increased risk of falls and decreased LE strength and endurance from time of 21 5 seconds, above cut-off score of 15 seconds for fall risk, and significantly above age and gender-matched norm value of 13 0 seconds   Jane Parker would benefit from skilled physical therapy to improve balance, decrease fall risk, improve endurance, improve LE strength, improve functional mobility, decrease caregiver burden, and maximize function  Impairments: abnormal coordination, abnormal gait, activity intolerance, impaired balance, impaired physical strength and lacks appropriate home exercise program  Understanding of Dx/Px/POC: good   Prognosis: good    Goals  ST  Pt will improve gait speed to at least 0 60m/s with least restrictive device within 4 weeks needed to improve safety with ambulation  2  Pt will improve TUG score by at least 3 seconds within 4 weeks needed to reduce fall risk  3  Pt will improve 5xSTS score by at least 3 seconds within 4 weeks needed to reduce fall risk  4  Pt will improve 6MWT distance to completing at least 300 feet before requiring seated rest break in 4 weeks to improve endurance  5  Pt will demonstrate independence with HEP within 4 weeks  LT  Pt will improve gait speed to at least 0 7 m/s with least restrictive device within 8 weeks needed to improve safety with ambulation  2  Pt will improve TUG score to at least 13 seconds within 8 weeks needed to reduce fall risk  3  Pt will improve 5xSTS score to <15 sec within 8 weeks needed to reduce fall risk  4  Pt will improve 6MWT distance to at least 450 feet within 8 weeks to improve endurance and functional moblity  5  Pt will demonstrate independence with HEP within 8 weeks      Plan  Patient would benefit from: skilled physical therapy  Planned therapy interventions: balance, neuromuscular re-education, patient education, breathing training, strengthening, stretching, coordination, therapeutic exercise, therapeutic training, flexibility, gait training and home exercise program  Frequency: 2x week  Duration in weeks: 8  Plan of Care beginning date: 3/22/2023  Plan of Care expiration date: 2023  Treatment plan discussed with: patient and family        Subjective Evaluation    History of Present Illness  Mechanism of injury: Stroke in 2022, daughter states that her balance has been getting sore and is lopsided  Daughter states that she doesn't try things if she can't do it  "Needs help with everything"  A few falls a few months ago  Difficulty with getting up  Abdi Bonner getting up from a chair and in the bathroom  L side weakness, uses SBQC for ambulation, uses it all the time since stroke  In the house holds onto the wall and does not use the cane  States that her L hand is weak and shakes       balance has been worse and she does clearly have worsened left-sided strength compared with her prior norm  Pain  No pain reported    Social Support  Steps to enter house: no  Stairs in house: no (elevator, lives on 6th floor)   Lives in: apartment  Lives with: spouse    Patient Goals  Patient goals for therapy: improved balance  Patient goal: improve walking        Objective       Manual Muscle Testing - Hip Left Right   Flexion 4- 4   Abduction 3+ 4   External Rotation 4- 4+     Manual Muscle Testing - Knee Left Right   Flexion 3+ 4+   Extension 4+ 4-     Manual Muscle Testing - Ankle Left Right   Doriflexion 4+ 4   Plantarflexion NT NT         Coordination Left Right   Heel To Shin     Finger To Nose impaired intact   Rapid Alternating Supination impaired intact   Alt toe tapping impaired intact       Sensation Left Right   Light Touch diminished intact       Muscle Tone (Modified Manish Scale**) Left Right   Hamstring 1 (difficulty relaxing, difficult to assess) 0   Gastroc 1 0   Quad 1 0   **  0 = no increase in muscle tone  1 = slight increase in muscle tone, minimal resistance at the end of ROM when moved  1+ = minimal resistance throughout the remainder (less than half) of ROM when moved  2 =  Davis increase in muscle tone through most of the ROM, but still moved easily  3 = considerable increase in muscle tone, movement difficult  4 = affects parts rigid       Balance Test Initial Eval      5x Sit to Stand: 21 53 sec no UE's      TU 6 sec with SBQC  16 2 sec without AD, CGA/min A Gait Speed:  0 51m/s with SBQC      2 Minute Walk Test: 130 feet before requiring seated rest break, 20 seconds left   , SpO2 99%      6 Minute Walk Test: NT      FGA:  NT            Gait Assessment: SBQC on R side, ineffective sequencing, decreased gait speed     Precautions: asthma, HTN, DMII    Manuals 3/23                                                                Neuro Re-Ed             Sidesteps             HKM             hurdles             backwards             Step up                                                                              Ther Ex             nustep             Sit to stand                                                    Ther Activity                                       Gait Training With SBQC x 2 trials with CGA/CS                                      Modalities             Education Fall risk, use of AD, adjustment of AD, using AD in house instead of furniture surfing

## 2023-03-23 ENCOUNTER — APPOINTMENT (OUTPATIENT)
Dept: PHYSICAL THERAPY | Facility: CLINIC | Age: 73
End: 2023-03-23

## 2023-03-27 ENCOUNTER — OFFICE VISIT (OUTPATIENT)
Dept: PHYSICAL THERAPY | Facility: CLINIC | Age: 73
End: 2023-03-27

## 2023-03-27 DIAGNOSIS — Z74.09 IMPAIRED FUNCTIONAL MOBILITY, BALANCE, GAIT, AND ENDURANCE: Primary | ICD-10-CM

## 2023-03-27 DIAGNOSIS — R53.1 LEFT-SIDED WEAKNESS: ICD-10-CM

## 2023-03-27 DIAGNOSIS — Z86.73 HISTORY OF CVA (CEREBROVASCULAR ACCIDENT): ICD-10-CM

## 2023-03-27 NOTE — PROGRESS NOTES
Daily Note     Today's date: 3/27/2023  Patient name: Portia Belle  : 1950  MRN: [id number]  Referring provider: Rena Puente MD  Dx:   Encounter Diagnosis     ICD-10-CM    1  Impaired functional mobility, balance, gait, and endurance  Z74 09       2  Left-sided weakness  R53 1       3  History of CVA (cerebrovascular accident)  Z86 73                      Subjective: States she was feeling dizzy this morning, did not want to come to therapy  She states that sometimes her BP is high, other times low  States she did not take her BP this morning  Objective: See treatment diary below  116/64 at start of session  SpO2 95-96%, HR 71  120/70 at end of session    Assessment: Tolerated treatment fair  Patient demonstrated fatigue post treatment, exhibited good technique with therapeutic exercises and would benefit from continued PT  Requires significant encouragement to continue with therapy, asking multiple times during session if it was time to go home  Significant fatigue and deconditioning, as well as asthma requiring seated breaks between exercises, utilized inhaler (x1) and vitals monitored throughout session  Continue to progress as tolerated  Plan: Continue per plan of care        Precautions: asthma, HTN, DMII  220-71=     Manuals 3/23 3/27                                                     Neuro Re-Ed           Sidesteps  In // bars (short) x 2 laps with BUE support         HKM           hurdles           backwards           Step up                                                                  Ther Ex           nustep  L2 for 2 minute intervals, total of 3 intervals with 1-2 minute rest breaks between    -120, SpO2 96-97%          Sit to stand  No UE's from chair 2 x 5 reps  HR to 112-123 SpO2 98%                                          Ther Activity                                 Gait Training With SBQC x 2 trials with CGA/CS With SBQC, 175 feet with CGA    SpO2 98% Modalities           Education Fall risk, use of AD, adjustment of AD, using AD in house instead of furniture surfing

## 2023-03-29 ENCOUNTER — OFFICE VISIT (OUTPATIENT)
Dept: PHYSICAL THERAPY | Facility: CLINIC | Age: 73
End: 2023-03-29

## 2023-03-29 DIAGNOSIS — Z86.73 HISTORY OF CVA (CEREBROVASCULAR ACCIDENT): ICD-10-CM

## 2023-03-29 DIAGNOSIS — R53.1 LEFT-SIDED WEAKNESS: Primary | ICD-10-CM

## 2023-03-29 DIAGNOSIS — I69.354 HEMIPLEGIA AND HEMIPARESIS FOLLOWING CEREBRAL INFARCTION AFFECTING LEFT NON-DOMINANT SIDE (HCC): Primary | ICD-10-CM

## 2023-03-29 DIAGNOSIS — Z74.09 IMPAIRED FUNCTIONAL MOBILITY, BALANCE, GAIT, AND ENDURANCE: ICD-10-CM

## 2023-03-29 NOTE — PROGRESS NOTES
Daily Note     Today's date: 3/29/2023  Patient name: oRnny Herman  : 1950  MRN: [id number]  Referring provider: Diana Glilespie MD  Dx:   Encounter Diagnosis     ICD-10-CM    1  Left-sided weakness  R53 1       2  History of CVA (cerebrovascular accident)  Z86 73       3  Impaired functional mobility, balance, gait, and endurance  Z74 09                      Subjective: States she does not have much dizziness today  Felt a little tired after last session  Objective: See treatment diary below  118/74 at start of session    Assessment: Tolerated treatment fair  Patient demonstrated fatigue post treatment, exhibited good technique with therapeutic exercises and would benefit from continued PT  Was able to increase nustep to 8 minutes in total adding additional 2-minute interval during session  Continue to monitor vitals throughout session  Able to ambulate without UE support, however requires some UE support with turning due to instability  Continue to progress as tolerated  Plan: Continue per plan of care        Precautions: asthma, HTN, DMII  220-71=     Manuals 3/23 3/27 3/29                                               Neuro Re-Ed          Sidesteps  In // bars (short) x 2 laps with BUE support In // bars (long) x 2 laps with 2-finger support on bars    HR to 124-130       HKM   // bars (long)  With BUE support, x 1 lap  -126       hurdles          backwards   // bars (long) x 2 laps  HR to 130       Step up                                                            Ther Ex          nustep  L2 for 2 minute intervals, total of 3 intervals with 1-2 minute rest breaks between    -120, SpO2 96-97%   L2 for 2 minute intervals, total of 4 intervals with 1 minute rest break between    HR   97-98%  RPE 8/10         Sit to stand  No UE's from chair 2 x 5 reps  HR to 112-123 SpO2 98% No UE's from chair 2 x 5 reps                                     Ther Activity Gait Training With SBQC x 2 trials with CGA/CS With SBQC, 175 feet with CGA    SpO2 98% // bars (no UE's) x 3 laps    -119  97%                           Modalities          Education Fall risk, use of AD, adjustment of AD, using AD in house instead of furniture surfing

## 2023-04-04 ENCOUNTER — APPOINTMENT (OUTPATIENT)
Dept: PHYSICAL THERAPY | Facility: CLINIC | Age: 73
End: 2023-04-04

## 2023-04-04 NOTE — PROGRESS NOTES
Daily Note     Today's date: 2023  Patient name: Alejandra Bentley  : 1950  MRN: [id number]  Referring provider: Radha Daniels MD  Dx: No diagnosis found  Subjective: ***      Objective: See treatment diary below      Assessment: Tolerated treatment well  Patient participated in skilled PT session focused on ***  Patient would continue to benefit from skilled PT interventions to address ***  Patient demonstrated fatigue post treatment      Plan: Continue per plan of care        Precautions: asthma, HTN, DMII  220-71=     Manuals 3/23 3/27 3/29 4/3                                              Neuro Re-Ed          Sidesteps  In // bars (short) x 2 laps with BUE support In // bars (long) x 2 laps with 2-finger support on bars    HR to 124-130       HKM   // bars (long)  With BUE support, x 1 lap  -126       hurdles          backwards   // bars (long) x 2 laps  HR to 130       Step up                                                            Ther Ex          nustep  L2 for 2 minute intervals, total of 3 intervals with 1-2 minute rest breaks between    -120, SpO2 96-97%   L2 for 2 minute intervals, total of 4 intervals with 1 minute rest break between    HR   97-98%  RPE 8/10         Sit to stand  No UE's from chair 2 x 5 reps  HR to 112-123 SpO2 98% No UE's from chair 2 x 5 reps                                     Ther Activity                              Gait Training With SBQC x 2 trials with CGA/CS With SBQC, 175 feet with CGA    SpO2 98% // bars (no UE's) x 3 laps    -119  97%                           Modalities          Education Fall risk, use of AD, adjustment of AD, using AD in house instead of furniture surfing

## 2023-04-05 ENCOUNTER — OFFICE VISIT (OUTPATIENT)
Dept: PHYSICAL THERAPY | Facility: CLINIC | Age: 73
End: 2023-04-05

## 2023-04-05 DIAGNOSIS — R53.1 LEFT-SIDED WEAKNESS: Primary | ICD-10-CM

## 2023-04-05 DIAGNOSIS — Z74.09 IMPAIRED FUNCTIONAL MOBILITY, BALANCE, GAIT, AND ENDURANCE: ICD-10-CM

## 2023-04-05 DIAGNOSIS — Z86.73 HISTORY OF CVA (CEREBROVASCULAR ACCIDENT): ICD-10-CM

## 2023-04-05 NOTE — PROGRESS NOTES
"Daily Note     Today's date: 2023  Patient name: Mira Zeng  : 1950  MRN: [id number]  Referring provider: Jaspreet Toro MD  Dx:   Encounter Diagnosis     ICD-10-CM    1  Left-sided weakness  R53 1       2  History of CVA (cerebrovascular accident)  Z86 73       3  Impaired functional mobility, balance, gait, and endurance  Z74 09           Start Time: 1045  Stop Time: 1145  Total time in clinic (min): 60 minutes    Subjective: Patient reports no new complaints  Objective: See treatment diary below      Assessment: Tolerated treatment well  Patient participated in skilled PT session focused on balance, gait, and endurance  Patient with increased c/o dizziness with exercises this session needing to rest   Patient able to tolerate another 2 min interval on nustep to 10 min  Vitals checked thru out session with HR between 106 - 133 and Sp02 97% - 99%  Patient would continue to benefit from skilled PT interventions to address deficits with balance, gait, and endurance  Patient demonstrated fatigue post treatment      Plan: Continue per plan of care        Precautions: asthma, HTN, DMII  220-71=     Manuals 3/23 3/27 3/29 4/5          Taken in beginning  Bp 132/68  HR 87  02 97%                                      Neuro Re-Ed          Sidesteps  In // bars (short) x 2 laps with BUE support In // bars (long) x 2 laps with 2-finger support on bars    HR to 124-130 //bars (long) 2 finger support  X 2 laps      C/o dz      HKM   // bars (long)  With BUE support, x 1 lap  -126 //bars (long) B/L UE support x 2 laps         hurdles          backwards   // bars (long) x 2 laps  HR to 130 //bars (long) x 1 5 laps    C/o Dz      Step up    //bars (long) 4\" step x 10 ea rest break between LEs   - 129                                                          Ther Ex          nustep  L2 for 2 minute intervals, total of 3 intervals with 1-2 minute rest breaks " between    -120, SpO2 96-97%   L2 for 2 minute intervals, total of 4 intervals with 1 minute rest break between    HR   97-98%  RPE 8/10   L2 for 2 min intervals for a total of 10 min          HR 87 - 92  02 97 -98%    RPE 2/10    C/o Dz      Sit to stand  No UE's from chair 2 x 5 reps  HR to 112-123 SpO2 98% No UE's from chair 2 x 5 reps No UE use  From chair x 10                                    Ther Activity                              Gait Training With SBQC x 2 trials with CGA/CS With SBQC, 175 feet with CGA    SpO2 98% // bars (no UE's) x 3 laps    -119  97% //bars (long)  No UE use  X 3 laps    02 99%                          Modalities          Education Fall risk, use of AD, adjustment of AD, using AD in house instead of furniture surfing

## 2023-04-06 ENCOUNTER — OFFICE VISIT (OUTPATIENT)
Dept: PHYSICAL THERAPY | Facility: CLINIC | Age: 73
End: 2023-04-06

## 2023-04-06 DIAGNOSIS — Z86.73 HISTORY OF CVA (CEREBROVASCULAR ACCIDENT): ICD-10-CM

## 2023-04-06 DIAGNOSIS — Z74.09 IMPAIRED FUNCTIONAL MOBILITY, BALANCE, GAIT, AND ENDURANCE: ICD-10-CM

## 2023-04-06 DIAGNOSIS — R53.1 LEFT-SIDED WEAKNESS: Primary | ICD-10-CM

## 2023-04-06 NOTE — PROGRESS NOTES
Daily Note     Today's date: 2023  Patient name: Asim Miles  : 1950  MRN: [id number]  Referring provider: Delaney Tejada MD  Dx:   Encounter Diagnosis     ICD-10-CM    1  Left-sided weakness  R53 1       2  History of CVA (cerebrovascular accident)  Z86 73       3  Impaired functional mobility, balance, gait, and endurance  Z74 09           Start Time: 0900  Stop Time: 0950  Total time in clinic (min): 50 minutes    Subjective: Patient reports that when she got home yesterday after therapy, she felt dizzy  Patient reports laying down for a little  Objective: See treatment diary below      Assessment: Tolerated treatment fair  Patient participated in skilled PT session focused on balance, gait, and endurance  Patient continues to reach max heart rate quickly with the exercises with dizziness  Monitored HR thru out session 86 - 130 Bpm with a quick recovery to below  once seated  Patient experienced increased HR with lateral hurdles, which was added today  Patient demonstrates improved tolerance on Nustep today with increased level of tension  Patient would continue to benefit from skilled PT interventions to address deficits with balance, gait, and endurance  Patient demonstrated fatigue post treatment      Plan: Continue per plan of care        Precautions: asthma, HTN, DMII  220-71=  149 x  80 = 119 Bpm    Manuals 3/23 3/27 3/29 4/5 4/6         Taken in beginning  Bp 132/68  HR 87  02 97%   In beginning Bp 132/70  HR 75  02 99%                                   Neuro Re-Ed          Sidesteps  In // bars (short) x 2 laps with BUE support In // bars (long) x 2 laps with 2-finger support on bars    HR to 124-130 //bars (long) 2 finger support  X 2 laps      C/o dz //bars (short) 2 finger support x 2 laps     02 98%  C/o Dz     HKM   // bars (long)  With BUE support, x 1 lap  -126 //bars (long) B/L UE support x 2 laps         hurdles     //bars (short) "B/L UE support 4\" hurdles (3)  Fwd x 2 laps    02 99%    Lat 2 x 1 lap  HR after 1st lap 130  02 98%    After 2nd lap    02 98%  Quick recovery once seated to below      backwards   // bars (long) x 2 laps  HR to 130 //bars (long) x 1 5 laps    C/o Dz      Step up    //bars (long) 4\" step x 10 ea rest break between LEs   - 129                                                          Ther Ex          nustep  L2 for 2 minute intervals, total of 3 intervals with 1-2 minute rest breaks between    -120, SpO2 96-97%   L2 for 2 minute intervals, total of 4 intervals with 1 minute rest break between    HR   97-98%  RPE 8/10   L2 for 2 min intervals for a total of 10 min          HR 87 - 92  02 97 -98%    RPE 2/10    C/o Dz L4  @ 4 min HR 86  02 93%    @7 min   02 98%    @ 10 min   HR 86  02 98%  Breaks at each 4 min, 7 min and 10 min  Total 10 min     Sit to stand  No UE's from chair 2 x 5 reps  HR to 112-123 SpO2 98% No UE's from chair 2 x 5 reps No UE use  From chair x 10 No UE use  From chair 2x10  1st 10  02 99%  2nd 10   02 98%                                   Ther Activity                              Gait Training With SBQC x 2 trials with CGA/CS With SBQC, 175 feet with CGA    SpO2 98% // bars (no UE's) x 3 laps    -119  97% //bars (long)  No UE use  X 3 laps    02 99% //bars (short) B/L UE support    02 98%                         Modalities          Education Fall risk, use of AD, adjustment of AD, using AD in house instead of furniture surfing                        "

## 2023-04-11 ENCOUNTER — APPOINTMENT (OUTPATIENT)
Dept: PHYSICAL THERAPY | Facility: CLINIC | Age: 73
End: 2023-04-11

## 2023-04-14 ENCOUNTER — APPOINTMENT (OUTPATIENT)
Dept: PHYSICAL THERAPY | Facility: CLINIC | Age: 73
End: 2023-04-14

## 2023-04-18 ENCOUNTER — APPOINTMENT (OUTPATIENT)
Dept: PHYSICAL THERAPY | Facility: CLINIC | Age: 73
End: 2023-04-18

## 2023-04-20 ENCOUNTER — APPOINTMENT (OUTPATIENT)
Dept: PHYSICAL THERAPY | Facility: CLINIC | Age: 73
End: 2023-04-20

## 2023-04-25 ENCOUNTER — APPOINTMENT (EMERGENCY)
Dept: RADIOLOGY | Facility: HOSPITAL | Age: 73
End: 2023-04-25

## 2023-04-25 ENCOUNTER — HOSPITAL ENCOUNTER (OUTPATIENT)
Facility: HOSPITAL | Age: 73
Setting detail: OBSERVATION
Discharge: HOME | End: 2023-04-26
Attending: EMERGENCY MEDICINE | Admitting: INTERNAL MEDICINE

## 2023-04-25 DIAGNOSIS — G45.9 TIA (TRANSIENT ISCHEMIC ATTACK): ICD-10-CM

## 2023-04-25 DIAGNOSIS — Z86.73 HISTORY OF CVA (CEREBROVASCULAR ACCIDENT): ICD-10-CM

## 2023-04-25 DIAGNOSIS — R55 SYNCOPE: ICD-10-CM

## 2023-04-25 DIAGNOSIS — R42 LIGHTHEADEDNESS: ICD-10-CM

## 2023-04-25 DIAGNOSIS — R42 DIZZINESS: Primary | ICD-10-CM

## 2023-04-25 DIAGNOSIS — R06.00 DYSPNEA: ICD-10-CM

## 2023-04-25 PROBLEM — R79.89 ELEVATED SERUM CREATININE: Status: ACTIVE | Noted: 2023-04-25

## 2023-04-25 PROBLEM — R06.02 SHORTNESS OF BREATH: Status: ACTIVE | Noted: 2023-04-25

## 2023-04-25 LAB
2HR DELTA HS TROPONIN: -1 NG/L
4HR DELTA HS TROPONIN: 0 NG/L
ALBUMIN SERPL BCP-MCNC: 3.4 G/DL (ref 3.5–5)
ALP SERPL-CCNC: 151 U/L (ref 46–116)
ALT SERPL W P-5'-P-CCNC: 20 U/L (ref 12–78)
ANION GAP SERPL CALCULATED.3IONS-SCNC: 7 MMOL/L (ref 4–13)
AST SERPL W P-5'-P-CCNC: 18 U/L (ref 5–45)
ATRIAL RATE: 88 BPM
BASOPHILS # BLD AUTO: 0.05 THOUSANDS/ΜL (ref 0–0.1)
BASOPHILS NFR BLD AUTO: 0 % (ref 0–1)
BILIRUB SERPL-MCNC: 0.8 MG/DL (ref 0.2–1)
BUN SERPL-MCNC: 11 MG/DL (ref 5–25)
CALCIUM ALBUM COR SERPL-MCNC: 9.6 MG/DL (ref 8.3–10.1)
CALCIUM SERPL-MCNC: 9.1 MG/DL (ref 8.3–10.1)
CARDIAC TROPONIN I PNL SERPL HS: 2 NG/L
CARDIAC TROPONIN I PNL SERPL HS: 3 NG/L
CARDIAC TROPONIN I PNL SERPL HS: 3 NG/L
CHLORIDE SERPL-SCNC: 108 MMOL/L (ref 96–108)
CO2 SERPL-SCNC: 22 MMOL/L (ref 21–32)
CREAT SERPL-MCNC: 1.35 MG/DL (ref 0.6–1.3)
EOSINOPHIL # BLD AUTO: 0.26 THOUSAND/ΜL (ref 0–0.61)
EOSINOPHIL NFR BLD AUTO: 2 % (ref 0–6)
ERYTHROCYTE [DISTWIDTH] IN BLOOD BY AUTOMATED COUNT: 12.8 % (ref 11.6–15.1)
GFR SERPL CREATININE-BSD FRML MDRD: 39 ML/MIN/1.73SQ M
GLUCOSE SERPL-MCNC: 120 MG/DL (ref 65–140)
GLUCOSE SERPL-MCNC: 128 MG/DL (ref 65–140)
GLUCOSE SERPL-MCNC: 173 MG/DL (ref 65–140)
HCT VFR BLD AUTO: 43.6 % (ref 34.8–46.1)
HGB BLD-MCNC: 13.2 G/DL (ref 11.5–15.4)
IMM GRANULOCYTES # BLD AUTO: 0.06 THOUSAND/UL (ref 0–0.2)
IMM GRANULOCYTES NFR BLD AUTO: 1 % (ref 0–2)
LYMPHOCYTES # BLD AUTO: 1.5 THOUSANDS/ΜL (ref 0.6–4.47)
LYMPHOCYTES NFR BLD AUTO: 12 % (ref 14–44)
MCH RBC QN AUTO: 28.9 PG (ref 26.8–34.3)
MCHC RBC AUTO-ENTMCNC: 30.3 G/DL (ref 31.4–37.4)
MCV RBC AUTO: 96 FL (ref 82–98)
MONOCYTES # BLD AUTO: 0.52 THOUSAND/ΜL (ref 0.17–1.22)
MONOCYTES NFR BLD AUTO: 4 % (ref 4–12)
NEUTROPHILS # BLD AUTO: 9.69 THOUSANDS/ΜL (ref 1.85–7.62)
NEUTS SEG NFR BLD AUTO: 81 % (ref 43–75)
NRBC BLD AUTO-RTO: 0 /100 WBCS
NT-PROBNP SERPL-MCNC: 65 PG/ML
P AXIS: 49 DEGREES
PLATELET # BLD AUTO: 326 THOUSANDS/UL (ref 149–390)
PLATELET # BLD AUTO: 363 THOUSANDS/UL (ref 149–390)
PMV BLD AUTO: 12 FL (ref 8.9–12.7)
PMV BLD AUTO: 12 FL (ref 8.9–12.7)
POTASSIUM SERPL-SCNC: 3.8 MMOL/L (ref 3.5–5.3)
PR INTERVAL: 148 MS
PROCALCITONIN SERPL-MCNC: 0.07 NG/ML
PROT SERPL-MCNC: 8.7 G/DL (ref 6.4–8.4)
QRS AXIS: 17 DEGREES
QRSD INTERVAL: 72 MS
QT INTERVAL: 376 MS
QTC INTERVAL: 454 MS
RBC # BLD AUTO: 4.56 MILLION/UL (ref 3.81–5.12)
SODIUM SERPL-SCNC: 137 MMOL/L (ref 135–147)
T WAVE AXIS: 268 DEGREES
VENTRICULAR RATE: 88 BPM
WBC # BLD AUTO: 12.08 THOUSAND/UL (ref 4.31–10.16)

## 2023-04-25 RX ORDER — ONDANSETRON 2 MG/ML
4 INJECTION INTRAMUSCULAR; INTRAVENOUS EVERY 6 HOURS PRN
Status: DISCONTINUED | OUTPATIENT
Start: 2023-04-25 | End: 2023-04-26 | Stop reason: HOSPADM

## 2023-04-25 RX ORDER — INSULIN LISPRO 100 [IU]/ML
1-6 INJECTION, SOLUTION INTRAVENOUS; SUBCUTANEOUS
Status: DISCONTINUED | OUTPATIENT
Start: 2023-04-25 | End: 2023-04-26 | Stop reason: HOSPADM

## 2023-04-25 RX ORDER — ACETAMINOPHEN 325 MG/1
650 TABLET ORAL EVERY 6 HOURS PRN
Status: DISCONTINUED | OUTPATIENT
Start: 2023-04-25 | End: 2023-04-26 | Stop reason: HOSPADM

## 2023-04-25 RX ORDER — ATORVASTATIN CALCIUM 40 MG/1
40 TABLET, FILM COATED ORAL
Status: DISCONTINUED | OUTPATIENT
Start: 2023-04-25 | End: 2023-04-26 | Stop reason: HOSPADM

## 2023-04-25 RX ORDER — CALCIUM CARBONATE 200(500)MG
1000 TABLET,CHEWABLE ORAL DAILY PRN
Status: DISCONTINUED | OUTPATIENT
Start: 2023-04-25 | End: 2023-04-26 | Stop reason: HOSPADM

## 2023-04-25 RX ORDER — AMLODIPINE BESYLATE 10 MG/1
10 TABLET ORAL DAILY
Status: DISCONTINUED | OUTPATIENT
Start: 2023-04-25 | End: 2023-04-26 | Stop reason: HOSPADM

## 2023-04-25 RX ORDER — FLUTICASONE FUROATE AND VILANTEROL 200; 25 UG/1; UG/1
1 POWDER RESPIRATORY (INHALATION) DAILY
Status: DISCONTINUED | OUTPATIENT
Start: 2023-04-25 | End: 2023-04-26 | Stop reason: HOSPADM

## 2023-04-25 RX ORDER — HEPARIN SODIUM 5000 [USP'U]/ML
5000 INJECTION, SOLUTION INTRAVENOUS; SUBCUTANEOUS EVERY 8 HOURS SCHEDULED
Status: DISCONTINUED | OUTPATIENT
Start: 2023-04-25 | End: 2023-04-26 | Stop reason: HOSPADM

## 2023-04-25 RX ORDER — SENNOSIDES 8.6 MG
2 TABLET ORAL DAILY PRN
Status: DISCONTINUED | OUTPATIENT
Start: 2023-04-25 | End: 2023-04-26 | Stop reason: HOSPADM

## 2023-04-25 RX ORDER — LISINOPRIL 10 MG/1
10 TABLET ORAL DAILY
Status: DISCONTINUED | OUTPATIENT
Start: 2023-04-25 | End: 2023-04-26 | Stop reason: HOSPADM

## 2023-04-25 RX ORDER — ALBUTEROL SULFATE 90 UG/1
2 AEROSOL, METERED RESPIRATORY (INHALATION) 2 TIMES DAILY
Status: DISCONTINUED | OUTPATIENT
Start: 2023-04-25 | End: 2023-04-26 | Stop reason: HOSPADM

## 2023-04-25 RX ORDER — ASPIRIN 81 MG/1
81 TABLET, CHEWABLE ORAL DAILY
Status: DISCONTINUED | OUTPATIENT
Start: 2023-04-25 | End: 2023-04-26 | Stop reason: HOSPADM

## 2023-04-25 RX ORDER — SODIUM CHLORIDE, SODIUM GLUCONATE, SODIUM ACETATE, POTASSIUM CHLORIDE, MAGNESIUM CHLORIDE, SODIUM PHOSPHATE, DIBASIC, AND POTASSIUM PHOSPHATE .53; .5; .37; .037; .03; .012; .00082 G/100ML; G/100ML; G/100ML; G/100ML; G/100ML; G/100ML; G/100ML
100 INJECTION, SOLUTION INTRAVENOUS CONTINUOUS
Status: DISPENSED | OUTPATIENT
Start: 2023-04-25 | End: 2023-04-25

## 2023-04-25 RX ORDER — DOCUSATE SODIUM 100 MG/1
100 CAPSULE, LIQUID FILLED ORAL 2 TIMES DAILY
Status: DISCONTINUED | OUTPATIENT
Start: 2023-04-25 | End: 2023-04-26 | Stop reason: HOSPADM

## 2023-04-25 RX ADMIN — ALBUTEROL SULFATE 2 PUFF: 90 AEROSOL, METERED RESPIRATORY (INHALATION) at 17:39

## 2023-04-25 RX ADMIN — SODIUM CHLORIDE, SODIUM GLUCONATE, SODIUM ACETATE, POTASSIUM CHLORIDE, MAGNESIUM CHLORIDE, SODIUM PHOSPHATE, DIBASIC, AND POTASSIUM PHOSPHATE 100 ML/HR: .53; .5; .37; .037; .03; .012; .00082 INJECTION, SOLUTION INTRAVENOUS at 15:25

## 2023-04-25 RX ADMIN — TICAGRELOR 90 MG: 90 TABLET ORAL at 21:11

## 2023-04-25 RX ADMIN — HEPARIN SODIUM 5000 UNITS: 5000 INJECTION INTRAVENOUS; SUBCUTANEOUS at 15:23

## 2023-04-25 RX ADMIN — LISINOPRIL 10 MG: 10 TABLET ORAL at 15:23

## 2023-04-25 RX ADMIN — HEPARIN SODIUM 5000 UNITS: 5000 INJECTION INTRAVENOUS; SUBCUTANEOUS at 21:11

## 2023-04-25 RX ADMIN — IOHEXOL 85 ML: 350 INJECTION, SOLUTION INTRAVENOUS at 12:18

## 2023-04-25 NOTE — ASSESSMENT & PLAN NOTE
Lab Results   Component Value Date    HGBA1C 6 1 (H) 04/28/2022       No results for input(s): POCGLU in the last 72 hours      Blood Sugar Average: Last 72 hrs:  · diabetic diet  · Hold Jentadueto while inpatient  · fingersticks QID with sliding scale coverage  · Add basal/bolus insulin if needed while inpatient

## 2023-04-25 NOTE — ASSESSMENT & PLAN NOTE
· Reports chronic and worsening shortness of breath with minimal exertion  · Has a history of chronic asthma but has no significant cough or wheezing at this time  · CXR is stable from prior, no significant pathology but will await official results  · Check procalcitonin and BNP  · Check serial troponins and echocardiogram  · Check ambulating pulse oximetry  · Consider outpatient stress testing if the above is unremarkable

## 2023-04-25 NOTE — ASSESSMENT & PLAN NOTE
· Baseline appears to have normal creatinine  · Possible CKD due to DM  · Check UA  · IV hydration as mentioned above  · BMP in AM

## 2023-04-25 NOTE — ASSESSMENT & PLAN NOTE
· Daughter reports syncopal events at home, with rapid turn to consciousness  · No symptoms to suggest a post ictal state    · Monitor on telemetry for arrhythmia  · Check orthostatic BPs  · Check ambulating pulse oximetry  · Check BNP  · PT/OT evalaution

## 2023-04-25 NOTE — H&P
[facility]  H&P  Name: Migdalia Quiros 71 y o  female I MRN: [id number]  Unit/Bed#: ED 15 I Date of Admission: 4/25/2023   Date of Service: 4/25/2023 I Hospital Day: 0      Assessment/Plan   Vertigo  Assessment & Plan  · Reports vertigo for multiple months  · Appears to be positional but seems to be worse when standing  · Check orthostatic BPs  · PT/OT  · Check echocardiogram  · Consider MRI brain given her history of CVA in the past   · IVF trial 500ml isolyte    Syncope  Assessment & Plan  · Daughter reports syncopal events at home, with rapid turn to consciousness  · No symptoms to suggest a post ictal state  · Monitor on telemetry for arrhythmia  · Check orthostatic BPs  · Check ambulating pulse oximetry  · Check BNP  · PT/OT evalaution    Shortness of breath  Assessment & Plan  · Reports chronic and worsening shortness of breath with minimal exertion  · Has a history of chronic asthma but has no significant cough or wheezing at this time  · CXR is stable from prior, no significant pathology but will await official results  · Check procalcitonin and BNP  · Check serial troponins and echocardiogram  · Check ambulating pulse oximetry  · Consider outpatient stress testing if the above is unremarkable  Elevated serum creatinine  Assessment & Plan  · Baseline appears to have normal creatinine  · Possible CKD due to DM  · Check UA  · IV hydration as mentioned above  · BMP in AM    Diabetes mellitus type 2, noninsulin dependent ([facility] )  Assessment & Plan  Lab Results   Component Value Date    HGBA1C 6 1 (H) 04/28/2022       No results for input(s): POCGLU in the last 72 hours      Blood Sugar Average: Last 72 hrs:  · diabetic diet  · Hold Jentadueto while inpatient  · fingersticks QID with sliding scale coverage  · Add basal/bolus insulin if needed while inpatient    Morbid obesity ([facility] )  Assessment & Plan  · BMI is 38  · Weight loss counseling when stable as an "outpatient    Mild intermittent asthma without complication  Assessment & Plan  · Appears stable at this time  · Continue breo inhaler while inpatient    History of CVA (cerebrovascular accident)  Assessment & Plan  · With residual left sided weakness  · Continue Brillinta and statin  · PT/OT evaluation         VTE Pharmacologic Prophylaxis:   Moderate Risk (Score 3-4) - Pharmacological DVT Prophylaxis Ordered: heparin  Code Status: Level 1 - Full Code   Discussion with family: Updated  (daughter) at bedside  Anticipated Length of Stay: Patient will be admitted on an observation basis with an anticipated length of stay of less than 2 midnights secondary to dizziness  Total Time Spent on Date of Encounter in care of patient: 40 minutes This time was spent on one or more of the following: performing physical exam; counseling and coordination of care; obtaining or reviewing history; documenting in the medical record; reviewing/ordering tests, medications or procedures; communicating with other healthcare professionals and discussing with patient's family/caregivers  Chief Complaint: dizziness, syncope, shortness of breath    History of Present Illness:  Antonio Jauregui is a 71 y o  female with a PMH of multiple who presents with dizziness, syncope and shortness of breath  Her daughter is translating, she reports that the patient has been experiencing chronic dizziness for months  She has a history of CVA in the past  Dizziness is worse with standing  Does not change with position when lying down or turning to the side  She also reports syncopal events which she describes as \"blacking out\" She does admit to loss of consciousness and falling to the ground, last episode was 1 week ago  She denies any pronged LOC or alteration of mental status after the LOC  Finally she also reports chronic shortness of breath with exertion   She reports difficulty with distances in her home and has to stop and " catch her breath  She denies cough, sputum, fever or chills  Review of Systems:  Review of Systems   All other systems reviewed and are negative  Past Medical and Surgical History:   Past Medical History:   Diagnosis Date   • Diabetes mellitus ([facility] )    • Hypertension    • Stroke [facility])        Past Surgical History:   Procedure Laterality Date   •  SECTION      x2       Meds/Allergies:  Prior to Admission medications    Medication Sig Start Date End Date Taking?  Authorizing Provider   albuterol (PROVENTIL HFA,VENTOLIN HFA) 90 mcg/act inhaler Inhale 2 puffs 2 (two) times a day 6/15/15   Historical Provider, MD   amLODIPine (NORVASC) 10 mg tablet Take 10 mg by mouth in the morning 22   Historical Provider, MD   aspirin 81 mg chewable tablet TAKE 1 TABLET (81 MG TOTAL) DAILY 22   Cherylene Brinks, MD   atorvastatin (LIPITOR) 40 mg tablet Take 40 mg by mouth 10/17/15   Historical Provider, MD   bisacodyl (DULCOLAX) 5 mg EC tablet Take 4 tablets (20 mg total) by mouth once for 1 dose 10/11/22 10/11/22  Mini Xiao MD   Blood Glucose Monitoring Suppl (ACCU-CHEK YOUSUF CONNECT) w/Device KIT 1 Device by Does not apply route daily  Patient not taking: Reported on 3/8/2023 7/1/15   Historical Provider, MD Charmaine Landers 200-25 MCG/ACT inhaler  23   Historical Provider, MD   glucose blood test strip 1 strip by In Vitro route daily 7/1/15   Historical Provider, MD   Jentadueto XR 2 5-1000 MG TB24  23   Historical Provider, MD   Lancets MISC 1 each by Does not apply route daily 7/1/15   Historical Provider, MD   lisinopril (ZESTRIL) 10 mg tablet Take 10 mg by mouth 10/17/15   Historical Provider, MD   polyethylene glycol (GOLYTELY) 4000 mL solution Take 4,000 mL by mouth once for 1 dose 10/11/22 10/11/22  Mini Xiao MD   ticagrelor (BRILINTA) 90 MG Take 1 tablet (90 mg total) by mouth every 12 (twelve) hours 1/30/22 3/8/23  Cherylene Brinks, MD   budesonide-formoterol (SYMBICORT) 80-4 5 MCG/ACT inhaler Inhale 1 puff 2 (two) times a day 1/8/16 4/25/23  Historical Provider, MD   folic acid (FOLVITE) 1 mg tablet Take 1 mg by mouth  Patient not taking: Reported on 3/8/2023  4/25/23  Historical Provider, MD   metFORMIN (GLUCOPHAGE-XR) 750 mg 24 hr tablet Take 750 mg by mouth  Patient not taking: Reported on 3/8/2023  4/25/23  Historical Provider, MD     I have reviewed home medications with patient family member  Allergies: No Known Allergies    Social History:  Marital Status: /Civil Union   Occupation: retired  Patient Pre-hospital Living Situation: Home  Patient Pre-hospital Level of Mobility: walks  Patient Pre-hospital Diet Restrictions: none  Substance Use History:   Social History     Substance and Sexual Activity   Alcohol Use No     Social History     Tobacco Use   Smoking Status Never   Smokeless Tobacco Never     Social History     Substance and Sexual Activity   Drug Use No       Family History:  Family History   Problem Relation Age of Onset   • Stroke Father        Physical Exam:     Vitals:   Blood Pressure: 125/65 (04/25/23 1415)  Pulse: 96 (04/25/23 1415)  Temperature: 97 8 °F (36 6 °C) (04/25/23 0948)  Temp Source: Temporal (04/25/23 0948)  Respirations: 20 (04/25/23 1415)  SpO2: 98 % (04/25/23 1415)    Physical Exam  Constitutional:       General: She is not in acute distress  Appearance: She is obese  She is not toxic-appearing  HENT:      Head: Normocephalic and atraumatic  Nose: Nose normal       Mouth/Throat:      Mouth: Mucous membranes are moist       Pharynx: Oropharynx is clear  Eyes:      General: No scleral icterus  Right eye: No discharge  Left eye: No discharge  Extraocular Movements: Extraocular movements intact  Conjunctiva/sclera: Conjunctivae normal       Pupils: Pupils are equal, round, and reactive to light  Cardiovascular:      Rate and Rhythm: Normal rate and regular rhythm     Pulmonary:      Effort: Pulmonary effort is normal       Breath sounds: No wheezing or rales  Abdominal:      General: Abdomen is flat  Palpations: Abdomen is soft  Musculoskeletal:      Cervical back: Normal range of motion and neck supple  Right lower leg: No edema  Left lower leg: No edema  Skin:     General: Skin is warm  Neurological:      Mental Status: She is alert and oriented to person, place, and time  Cranial Nerves: No cranial nerve deficit  Motor: No weakness  Psychiatric:         Mood and Affect: Mood normal          Behavior: Behavior normal           Additional Data:     Lab Results:  Results from last 7 days   Lab Units 04/25/23  1015   WBC Thousand/uL 12 08*   HEMOGLOBIN g/dL 13 2   HEMATOCRIT % 43 6   PLATELETS Thousands/uL 363   NEUTROS PCT % 81*   LYMPHS PCT % 12*   MONOS PCT % 4   EOS PCT % 2     Results from last 7 days   Lab Units 04/25/23  1015   SODIUM mmol/L 137   POTASSIUM mmol/L 3 8   CHLORIDE mmol/L 108   CO2 mmol/L 22   BUN mg/dL 11   CREATININE mg/dL 1 35*   ANION GAP mmol/L 7   CALCIUM mg/dL 9 1   ALBUMIN g/dL 3 4*   TOTAL BILIRUBIN mg/dL 0 80   ALK PHOS U/L 151*   ALT U/L 20   AST U/L 18   GLUCOSE RANDOM mg/dL 173*                       Lines/Drains:  Invasive Devices     Peripheral Intravenous Line  Duration           Peripheral IV 04/25/23 Right Antecubital <1 day                    Imaging: Personally reviewed the following imaging: chest xray  CTA head and neck with and without contrast   Final Result by Michael Melendez MD (04/25 8774)      CT HEAD   -No acute intracranial abnormality    -Unchanged chronic infarct in left PCA territory involving left parasagittal parietal-occipital junction and left medial occipital lobes  -Unchanged chronic lacunar infarcts in right basal ganglia, bilateral corona radiata, and right thalamus    -Moderate chronic microangiopathy  CTA HEAD AND NECK   -Negative CTA head and neck for large vessel occlusion or dissection  -Unchanged severe focal stenosis in left PCA P2 segment likely due to atherosclerotic disease      -Unchanged moderate focal stenosis in left MCA M2 branch vessel in origin of left vertebral artery likely due to atherosclerotic disease    -Unchanged 0 3 cm infundibulum in expected origin of left posterior communicating artery  Additional chronic/incidental findings as detailed above  Workstation performed: [id number]         XR chest 1 view portable   ED Interpretation by Monique Hernandez DO (04/25 1241)   No acute cardiopulmonary process          EKG and Other Studies Reviewed on Admission:   · EKG: NSR  HR 88     ** Please Note: This note has been constructed using a voice recognition system   **

## 2023-04-25 NOTE — ASSESSMENT & PLAN NOTE
· Reports vertigo for multiple months  · Appears to be positional but seems to be worse when standing     · Check orthostatic BPs  · PT/OT  · Check echocardiogram  · Consider MRI brain given her history of CVA in the past   · IVF trial 500ml isolyte

## 2023-04-25 NOTE — ED PROVIDER NOTES
History  Chief Complaint   Patient presents with   • Dizziness     Pt's daughter reports more frequent dizziness causing her to black out; she states that during her PT sessions her HR drops and cardiology cannot see her until the end of May     71year-old female with a past medical history of asthma, diabetes, hypertension, and a prior CVA with residual left-sided weakness  Patient is here with her daughter who provides translation  She states that patient's stroke was a little over a year ago  She has some residual left-sided weakness and has been going to physical therapy for this  Patient started complaining of dizziness about a month ago  Patient describes it as room spinning, lightheadedness, and feeling off balance while she is walking  Daughter notes that her gait has looked much worse recently despite PT  Patient does not feel the symptoms when she is lying and standing still  She only feels them while she is up and ambulating  Daughter notes that she has had multiple episodes of syncope recently  Daughter states that she has witnessed all of these episodes and patient has not hit her head  She only syncopized this for a few seconds and will then return to her baseline mental status  She has no chest pain  Daughter notes that she gets short of breath with exertion  She states that this is new and patient did not used to feel like this  No recent fevers, chills, cough, congestion, or other upper respiratory symptoms  Triage note states that patient's heart rate drops during PT sessions  However, according to chart review, patient's heart rate drops to the 80s during PT  Patient is lying in bed and is asymptomatic at this time  Prior to Admission Medications   Prescriptions Last Dose Informant Patient Reported? Taking?    Blood Glucose Monitoring Suppl (ACCU-CHEK YOUSUF CONNECT) w/Device KIT 4/24/2023  Yes Yes   Sig: Use 1 Device daily   Breo Ellipta 200-25 MCG/ACT inhaler 4/24/2023 Yes Yes   Jentadueto XR 2 5-1000 MG TB24 Unknown  Yes No   Lancets MISC 2023  Yes Yes   Si each by Does not apply route daily   albuterol (PROVENTIL HFA,VENTOLIN HFA) 90 mcg/act inhaler 2023  Yes Yes   Sig: Inhale 2 puffs 2 (two) times a day   amLODIPine (NORVASC) 10 mg tablet 2023  Yes Yes   Sig: Take 10 mg by mouth in the morning   aspirin 81 mg chewable tablet 2023  No Yes   Sig: TAKE 1 TABLET (81 MG TOTAL) DAILY   atorvastatin (LIPITOR) 40 mg tablet 2023  Yes Yes   Sig: Take 40 mg by mouth   bisacodyl (DULCOLAX) 5 mg EC tablet   No No   Sig: Take 4 tablets (20 mg total) by mouth once for 1 dose   glucose blood test strip 2023  Yes Yes   Si strip by In Vitro route daily   lisinopril (ZESTRIL) 10 mg tablet 2023  Yes Yes   Sig: Take 10 mg by mouth   polyethylene glycol (GOLYTELY) 4000 mL solution   No No   Sig: Take 4,000 mL by mouth once for 1 dose   ticagrelor (BRILINTA) 90 MG   No No   Sig: Take 1 tablet (90 mg total) by mouth every 12 (twelve) hours      Facility-Administered Medications: None       Past Medical History:   Diagnosis Date   • Diabetes mellitus ([facility] [address] )    • Hypertension    • Stroke [facility])        Past Surgical History:   Procedure Laterality Date   •  SECTION      x2       Family History   Problem Relation Age of Onset   • Stroke Father      I have reviewed and agree with the history as documented  E-Cigarette/Vaping   • E-Cigarette Use Never User      E-Cigarette/Vaping Substances   • Nicotine No    • THC No    • CBD No    • Flavoring No      Social History     Tobacco Use   • Smoking status: Never   • Smokeless tobacco: Never   Vaping Use   • Vaping Use: Never used   Substance Use Topics   • Alcohol use: No   • Drug use: No        Review of Systems   Constitutional: Negative for chills and fever  HENT: Negative for congestion, rhinorrhea and sore throat  Eyes: Negative for pain and redness     Respiratory: Negative for cough, chest tightness, shortness of breath and wheezing  Cardiovascular: Negative for chest pain and palpitations  Gastrointestinal: Negative for abdominal pain, diarrhea, nausea and vomiting  Endocrine: Negative  Genitourinary: Negative for difficulty urinating and hematuria  Musculoskeletal: Positive for gait problem  Negative for back pain and myalgias  Skin: Negative for pallor and rash  Allergic/Immunologic: Negative  Neurological: Positive for dizziness, syncope and light-headedness  Negative for headaches  Hematological: Negative  Physical Exam  ED Triage Vitals [04/25/23 0948]   Temperature Pulse Respirations Blood Pressure SpO2   97 8 °F (36 6 °C) 101 18 150/76 100 %      Temp Source Heart Rate Source Patient Position - Orthostatic VS BP Location FiO2 (%)   Temporal Monitor Lying Left arm --      Pain Score       No Pain             Orthostatic Vital Signs  Vitals:    04/25/23 1411 04/25/23 1412 04/25/23 1415 04/25/23 1445   BP: 127/67 125/65 125/65 114/81   Pulse:   96 (!) 119   Patient Position - Orthostatic VS: Sitting - Orthostatic VS Standing - Orthostatic VS Lying Sitting       Physical Exam  Vitals and nursing note reviewed  Constitutional:       General: She is not in acute distress  Appearance: Normal appearance  She is not ill-appearing  HENT:      Head: Normocephalic and atraumatic  Eyes:      Conjunctiva/sclera: Conjunctivae normal    Cardiovascular:      Rate and Rhythm: Normal rate and regular rhythm  Heart sounds: No murmur heard  Pulmonary:      Effort: Pulmonary effort is normal  No respiratory distress  Breath sounds: Normal breath sounds  Abdominal:      General: Abdomen is flat  Palpations: Abdomen is soft  Musculoskeletal:         General: Normal range of motion  Cervical back: Normal range of motion and neck supple  Skin:     General: Skin is warm and dry  Neurological:      General: No focal deficit present        Mental Status: She is alert and oriented to person, place, and time  Cranial Nerves: No cranial nerve deficit  Motor: Weakness (Left-sided, baseline) present        Comments: Unable to walk patient secondary to dizziness while sitting up         ED Medications  Medications   acetaminophen (TYLENOL) tablet 650 mg (has no administration in time range)   docusate sodium (COLACE) capsule 100 mg (has no administration in time range)   senna (SENOKOT) tablet 17 2 mg (has no administration in time range)   ondansetron (ZOFRAN) injection 4 mg (has no administration in time range)   calcium carbonate (TUMS) chewable tablet 1,000 mg (has no administration in time range)   heparin (porcine) subcutaneous injection 5,000 Units (5,000 Units Subcutaneous Given 4/25/23 1523)   albuterol (PROVENTIL HFA,VENTOLIN HFA) inhaler 2 puff (has no administration in time range)   amLODIPine (NORVASC) tablet 10 mg (0 mg Oral Hold 4/25/23 1523)   aspirin chewable tablet 81 mg (0 mg Oral Hold 4/25/23 1523)   atorvastatin (LIPITOR) tablet 40 mg (0 mg Oral Hold 4/25/23 1539)   fluticasone-vilanterol 200-25 mcg/actuation 1 puff (has no administration in time range)   lisinopril (ZESTRIL) tablet 10 mg (0 mg Oral Hold 4/25/23 1540)   ticagrelor (BRILINTA) tablet 90 mg (has no administration in time range)   insulin lispro (HumaLOG) 100 units/mL subcutaneous injection 1-6 Units (has no administration in time range)   insulin lispro (HumaLOG) 100 units/mL subcutaneous injection 1-6 Units (has no administration in time range)   multi-electrolyte (PLASMALYTE-A/ISOLYTE-S PH 7 4) IV solution (100 mL/hr Intravenous New Bag 4/25/23 1525)   iohexol (OMNIPAQUE) 350 MG/ML injection (SINGLE-DOSE) 85 mL (85 mL Intravenous Given 4/25/23 1218)       Diagnostic Studies  Results Reviewed     Procedure Component Value Units Date/Time    Platelet count [[id number]]  (Normal) Collected: 04/25/23 1537    Lab Status: Final result Specimen: Blood from Arm, Right Updated: 04/25/23 1549     Platelets 711 Thousands/uL      MPV 12 0 fL     HS Troponin I 4hr [[id number]] Collected: 04/25/23 1537    Lab Status:  In process Specimen: Blood from Arm, Right Updated: 04/25/23 1541    Procalcitonin [[id number]]  (Normal) Collected: 04/25/23 1015    Lab Status: Final result Specimen: Blood from Arm, Right Updated: 04/25/23 1430     Procalcitonin 0 07 ng/ml     Urinalysis with microscopic [[id number]]     Lab Status: No result Specimen: Urine     NT-BNP [facility] only [[id number]]  (Normal) Collected: 04/25/23 1015    Lab Status: Final result Specimen: Blood from Arm, Right Updated: 04/25/23 1411     NT-proBNP 65 pg/mL     HS Troponin I 2hr [[id number]]  (Normal) Collected: 04/25/23 1203    Lab Status: Final result Specimen: Blood from Arm, Left Updated: 04/25/23 1255     hs TnI 2hr 2 ng/L      Delta 2hr hsTnI -1 ng/L     Comprehensive metabolic panel [[id number]]  (Abnormal) Collected: 04/25/23 1015    Lab Status: Final result Specimen: Blood from Arm, Right Updated: 04/25/23 1152     Sodium 137 mmol/L      Potassium 3 8 mmol/L      Chloride 108 mmol/L      CO2 22 mmol/L      ANION GAP 7 mmol/L      BUN 11 mg/dL      Creatinine 1 35 mg/dL      Glucose 173 mg/dL      Calcium 9 1 mg/dL      Corrected Calcium 9 6 mg/dL      AST 18 U/L      ALT 20 U/L      Alkaline Phosphatase 151 U/L      Total Protein 8 7 g/dL      Albumin 3 4 g/dL      Total Bilirubin 0 80 mg/dL      eGFR 39 ml/min/1 73sq m     Narrative:      [place] guidelines for Chronic Kidney Disease (CKD):   •  Stage 1 with normal or high GFR (GFR > 90 mL/min/1 73 square meters)  •  Stage 2 Mild CKD (GFR = 60-89 mL/min/1 73 square meters)  •  Stage 3A Moderate CKD (GFR = 45-59 mL/min/1 73 square meters)  •  Stage 3B Moderate CKD (GFR = 30-44 mL/min/1 73 square meters)  •  Stage 4 Severe CKD (GFR = 15-29 mL/min/1 73 square meters)  •  Stage 5 End Stage CKD (GFR <15 mL/min/1 73 square meters)  Note: GFR calculation is accurate only with a steady state creatinine    HS Troponin 0hr (reflex protocol) [[id number]]  (Normal) Collected: 04/25/23 1015    Lab Status: Final result Specimen: Blood from Arm, Right Updated: 04/25/23 1059     hs TnI 0hr 3 ng/L     CBC and differential [[id number]]  (Abnormal) Collected: 04/25/23 1015    Lab Status: Final result Specimen: Blood from Arm, Right Updated: 04/25/23 1030     WBC 12 08 Thousand/uL      RBC 4 56 Million/uL      Hemoglobin 13 2 g/dL      Hematocrit 43 6 %      MCV 96 fL      MCH 28 9 pg      MCHC 30 3 g/dL      RDW 12 8 %      MPV 12 0 fL      Platelets 460 Thousands/uL      nRBC 0 /100 WBCs      Neutrophils Relative 81 %      Immat GRANS % 1 %      Lymphocytes Relative 12 %      Monocytes Relative 4 %      Eosinophils Relative 2 %      Basophils Relative 0 %      Neutrophils Absolute 9 69 Thousands/µL      Immature Grans Absolute 0 06 Thousand/uL      Lymphocytes Absolute 1 50 Thousands/µL      Monocytes Absolute 0 52 Thousand/µL      Eosinophils Absolute 0 26 Thousand/µL      Basophils Absolute 0 05 Thousands/µL                  CTA head and neck with and without contrast   Final Result by Amy Altman MD (04/25 1245)      CT HEAD   -No acute intracranial abnormality    -Unchanged chronic infarct in left PCA territory involving left parasagittal parietal-occipital junction and left medial occipital lobes  -Unchanged chronic lacunar infarcts in right basal ganglia, bilateral corona radiata, and right thalamus    -Moderate chronic microangiopathy  CTA HEAD AND NECK   -Negative CTA head and neck for large vessel occlusion or dissection    -Unchanged severe focal stenosis in left PCA P2 segment likely due to atherosclerotic disease      -Unchanged moderate focal stenosis in left MCA M2 branch vessel in origin of left vertebral artery likely due to atherosclerotic disease    -Unchanged 0 3 cm infundibulum in expected origin of left posterior communicating artery  Additional chronic/incidental findings as detailed above  Workstation performed: [id number]         XR chest 1 view portable   ED Interpretation by Vicente Covarrubias DO (04/25 1241)   No acute cardiopulmonary process            Procedures  Procedures      ED Course  ED Course as of 04/25/23 1612   Tue Apr 25, 2023   1103 ECG 12 lead  This ECG was interpreted by me  The heart rate is 88, which is normal  The rhythm is regular  The axis is normal  The P waves are normal and the AR interval is normal  The QRS height is normal and width is normal  The ST segments are not elevated or depressed  The T waves are flattened  The QT segment is normal  This ecg shows sinus rhythm with T wave changes  [address] for admission                                       Medical Decision Making  32-year-old female presents with dizziness, lightheadedness, and syncope  Patient has had multiple syncopal episodes  Exam is normal at this time  Concern for stroke, dysrhythmia, anemia, electrolyte abnormalities, or other acute cardiopulmonary process  Will evaluate further with CBC, CMP, troponin, chest x-ray, EKG, and CTA of the head and neck  We will continue to monitor for symptoms  Patient remained stable while here in the ED  Discussed case with medicine  Patient was admitted for further work-up and management  Dizziness: acute illness or injury  Dyspnea: acute illness or injury  Lightheadedness: acute illness or injury  Syncope: complicated acute illness or injury  Amount and/or Complexity of Data Reviewed  Independent Historian: caregiver  External Data Reviewed: notes  Details: Reviewed past medical history and medications  Reviewed recent few PT notes  Labs: ordered  Radiology: ordered and independent interpretation performed  ECG/medicine tests: ordered and independent interpretation performed  Decision-making details documented in ED Course        Risk  Decision regarding hospitalization  Disposition  Final diagnoses:   Dizziness   Lightheadedness   Syncope   Dyspnea     Time reflects when diagnosis was documented in both MDM as applicable and the Disposition within this note     Time User Action Codes Description Comment    4/25/2023  2:04 PM Luma Silva Add [R42] Dizziness     4/25/2023  2:04 PM Luma Meyerus Add [R42] Lightheadedness     4/25/2023  2:04 PM Deni Burton Dvorište Add [R55] Syncope     4/25/2023  2:04 PM Luma Silva Add [R06 00] Dyspnea       ED Disposition     ED Disposition   Admit    Condition   Stable    Date/Time   Tue Apr 25, 2023  2:04 PM    Comment   Case was discussed with Dr Cami Echeverria and the patient's admission status was agreed to be Admission Status: observation status to the service of Dr Cami Echeverria              Follow-up Information    None         Current Discharge Medication List      CONTINUE these medications which have NOT CHANGED    Details   albuterol (PROVENTIL HFA,VENTOLIN HFA) 90 mcg/act inhaler Inhale 2 puffs 2 (two) times a day      amLODIPine (NORVASC) 10 mg tablet Take 10 mg by mouth in the morning      aspirin 81 mg chewable tablet TAKE 1 TABLET (81 MG TOTAL) DAILY  Qty: 90 tablet, Refills: 0    Associated Diagnoses: Dysarthria due to acute stroke (HCC)      atorvastatin (LIPITOR) 40 mg tablet Take 40 mg by mouth      Blood Glucose Monitoring Suppl (ACCU-CHEK YOUSUF CONNECT) w/Device KIT Use 1 Device daily      Breo Ellipta 200-25 MCG/ACT inhaler       glucose blood test strip 1 strip by In Vitro route daily      Lancets MISC 1 each by Does not apply route daily      lisinopril (ZESTRIL) 10 mg tablet Take 10 mg by mouth      bisacodyl (DULCOLAX) 5 mg EC tablet Take 4 tablets (20 mg total) by mouth once for 1 dose  Qty: 4 tablet, Refills: 0    Associated Diagnoses: Screening for colon cancer      Jentadueto XR 2 5-1000 MG TB24       polyethylene glycol (GOLYTELY) 4000 mL solution Take 4,000 mL by mouth once for 1 dose  Qty: 4000 mL, Refills: 0    Associated Diagnoses: Screening for colon cancer      ticagrelor (BRILINTA) 90 MG Take 1 tablet (90 mg total) by mouth every 12 (twelve) hours  Qty: 180 tablet, Refills: 0    Associated Diagnoses: TIA (transient ischemic attack); History of CVA (cerebrovascular accident)           No discharge procedures on file  PDMP Review       Value Time User    PDMP Reviewed  Yes 1/30/2022  3:48 PM Theodora Silveira MD           ED Provider  Attending physically available and evaluated THE [facility] AT [facility]  I managed the patient along with the ED Attending      Electronically Signed by         Donald Rangel DO  04/25/23 0556

## 2023-04-26 ENCOUNTER — APPOINTMENT (OUTPATIENT)
Dept: PHYSICAL THERAPY | Facility: CLINIC | Age: 73
End: 2023-04-26

## 2023-04-26 ENCOUNTER — APPOINTMENT (OUTPATIENT)
Dept: NON INVASIVE DIAGNOSTICS | Facility: HOSPITAL | Age: 73
End: 2023-04-26

## 2023-04-26 VITALS
SYSTOLIC BLOOD PRESSURE: 160 MMHG | RESPIRATION RATE: 18 BRPM | OXYGEN SATURATION: 98 % | HEIGHT: 61 IN | TEMPERATURE: 97.7 F | WEIGHT: 202 LBS | BODY MASS INDEX: 38.14 KG/M2 | HEART RATE: 89 BPM | DIASTOLIC BLOOD PRESSURE: 75 MMHG

## 2023-04-26 LAB
ANION GAP SERPL CALCULATED.3IONS-SCNC: 6 MMOL/L (ref 4–13)
AORTIC ROOT: 2.4 CM
APICAL FOUR CHAMBER EJECTION FRACTION: 62 %
ASCENDING AORTA: 2.9 CM
BACTERIA UR QL AUTO: ABNORMAL /HPF
BILIRUB UR QL STRIP: NEGATIVE
BUN SERPL-MCNC: 11 MG/DL (ref 5–25)
CALCIUM SERPL-MCNC: 8.8 MG/DL (ref 8.3–10.1)
CHLORIDE SERPL-SCNC: 109 MMOL/L (ref 96–108)
CLARITY UR: CLEAR
CO2 SERPL-SCNC: 23 MMOL/L (ref 21–32)
COLOR UR: ABNORMAL
CREAT SERPL-MCNC: 1.14 MG/DL (ref 0.6–1.3)
E WAVE DECELERATION TIME: 229 MS
ERYTHROCYTE [DISTWIDTH] IN BLOOD BY AUTOMATED COUNT: 12.8 % (ref 11.6–15.1)
FRACTIONAL SHORTENING: 0 (ref 28–44)
GFR SERPL CREATININE-BSD FRML MDRD: 48 ML/MIN/1.73SQ M
GLUCOSE SERPL-MCNC: 113 MG/DL (ref 65–140)
GLUCOSE SERPL-MCNC: 116 MG/DL (ref 65–140)
GLUCOSE SERPL-MCNC: 154 MG/DL (ref 65–140)
GLUCOSE UR STRIP-MCNC: NEGATIVE MG/DL
HCT VFR BLD AUTO: 37.4 % (ref 34.8–46.1)
HGB BLD-MCNC: 12 G/DL (ref 11.5–15.4)
HGB UR QL STRIP.AUTO: NEGATIVE
HYALINE CASTS #/AREA URNS LPF: ABNORMAL /LPF
INTERVENTRICULAR SEPTUM IN DIASTOLE (PARASTERNAL SHORT AXIS VIEW): 1.2 CM
INTERVENTRICULAR SEPTUM: 1.2 CM (ref 0.6–1.1)
KETONES UR STRIP-MCNC: NEGATIVE MG/DL
LA/AORTA RATIO 2D: 1.54
LAAS-AP2: 20.1 CM2
LAAS-AP4: 17.6 CM2
LEFT ATRIUM SIZE: 3.7 CM
LEFT INTERNAL DIMENSION IN SYSTOLE: 2.5 CM (ref 2.1–4)
LEFT VENTRICULAR INTERNAL DIMENSION IN DIASTOLE: 2.5 CM (ref 3.5–6)
LEFT VENTRICULAR POSTERIOR WALL IN END DIASTOLE: 2.4 CM
LEFT VENTRICULAR STROKE VOLUME: 0 ML
LEUKOCYTE ESTERASE UR QL STRIP: NEGATIVE
LVSV (TEICH): 0 ML
MCH RBC QN AUTO: 30.2 PG (ref 26.8–34.3)
MCHC RBC AUTO-ENTMCNC: 32.1 G/DL (ref 31.4–37.4)
MCV RBC AUTO: 94 FL (ref 82–98)
MUCOUS THREADS UR QL AUTO: ABNORMAL
MV E'TISSUE VEL-SEP: 7 CM/S
MV PEAK A VEL: 1 M/S
MV PEAK E VEL: 62 CM/S
MV STENOSIS PRESSURE HALF TIME: 67 MS
MV VALVE AREA P 1/2 METHOD: 3.28
NITRITE UR QL STRIP: NEGATIVE
NON-SQ EPI CELLS URNS QL MICRO: ABNORMAL /HPF
PH UR STRIP.AUTO: 6 [PH]
PLATELET # BLD AUTO: 300 THOUSANDS/UL (ref 149–390)
PMV BLD AUTO: 11.8 FL (ref 8.9–12.7)
POTASSIUM SERPL-SCNC: 3.7 MMOL/L (ref 3.5–5.3)
PROT UR STRIP-MCNC: ABNORMAL MG/DL
RBC # BLD AUTO: 3.97 MILLION/UL (ref 3.81–5.12)
RBC #/AREA URNS AUTO: ABNORMAL /HPF
RIGHT VENTRICLE ID DIMENSION: 2.8 CM
SL CV LV EF: 65
SL CV PED ECHO LEFT VENTRICLE DIASTOLIC VOLUME (MOD BIPLANE) 2D: 22 ML
SL CV PED ECHO LEFT VENTRICLE SYSTOLIC VOLUME (MOD BIPLANE) 2D: 22 ML
SODIUM SERPL-SCNC: 138 MMOL/L (ref 135–147)
SP GR UR STRIP.AUTO: 1.04 (ref 1–1.03)
TRICUSPID ANNULAR PLANE SYSTOLIC EXCURSION: 2 CM
UROBILINOGEN UR STRIP-ACNC: <2 MG/DL
WBC # BLD AUTO: 11.7 THOUSAND/UL (ref 4.31–10.16)
WBC #/AREA URNS AUTO: ABNORMAL /HPF

## 2023-04-26 RX ADMIN — HEPARIN SODIUM 5000 UNITS: 5000 INJECTION INTRAVENOUS; SUBCUTANEOUS at 06:30

## 2023-04-26 RX ADMIN — LISINOPRIL 10 MG: 10 TABLET ORAL at 08:54

## 2023-04-26 RX ADMIN — INSULIN LISPRO 1 UNITS: 100 INJECTION, SOLUTION INTRAVENOUS; SUBCUTANEOUS at 12:01

## 2023-04-26 RX ADMIN — ASPIRIN 81 MG: 81 TABLET, CHEWABLE ORAL at 08:54

## 2023-04-26 RX ADMIN — DOCUSATE SODIUM 100 MG: 100 CAPSULE, LIQUID FILLED ORAL at 08:54

## 2023-04-26 RX ADMIN — TICAGRELOR 90 MG: 90 TABLET ORAL at 08:56

## 2023-04-26 RX ADMIN — ALBUTEROL SULFATE 2 PUFF: 90 AEROSOL, METERED RESPIRATORY (INHALATION) at 08:55

## 2023-04-26 RX ADMIN — AMLODIPINE BESYLATE 10 MG: 10 TABLET ORAL at 08:54

## 2023-04-26 RX ADMIN — PERFLUTREN 0.4 ML/MIN: 6.52 INJECTION, SUSPENSION INTRAVENOUS at 10:40

## 2023-04-26 RX ADMIN — FLUTICASONE FUROATE AND VILANTEROL TRIFENATATE 1 PUFF: 200; 25 POWDER RESPIRATORY (INHALATION) at 08:55

## 2023-04-26 NOTE — ASSESSMENT & PLAN NOTE
· Reports vertigo for multiple months  · Appears to be positional but seems to be worse when standing  · ECHO is unremarkable, showing LV EF of 08%; systolic and diastolic function are normal; trivial pericardial effusion, and dilated left atrium  · CTA Head & Neck shows no acute intracranial abnormality or large vessel occlusion or dissection  Shows unchanged chronic infarcts of left PCA territory and lacunar infarcts, and unchanged severe focal stenosis of left PCA and moderate focal stenosis in left MCA most likely d/t atherosclerotic disease    · Orthostatics negative   · No events on tele   · Refer PT/OT outpatient  · Encourage fluids outpatient

## 2023-04-26 NOTE — CASE MANAGEMENT
Case Management Assessment    Patient name Tammy Cohen  Location CW2 216/CW2 216-01 MRN [id number]  : 1950 Date 2023       Current Admission Date: 2023  Current Admission Diagnosis:Vertigo   Patient Active Problem List    Diagnosis Date Noted   • Syncope 2023   • Shortness of breath 2023   • Elevated serum creatinine 2023   • Hemiplegia and hemiparesis following cerebral infarction affecting left non-dominant side ([facility] ) 2023   • Primary hypertension 2022   • Stroke-like symptoms 2022   • Dysarthria as late effect of stroke 2022   • Mild intermittent asthma without complication    • Left-sided weakness 2015   • Vertigo 2015   • Migraine 10/23/2015   • Morbid obesity ([facility] ) 10/17/2015   • History of CVA (cerebrovascular accident) 10/15/2015   • Diabetes mellitus type 2, noninsulin dependent ([place] [id number] ) 2015      LOS (days): 0  Geometric Mean LOS (GMLOS) (days):   Days to GMLOS:     OBJECTIVE:              Current admission status: Observation       Preferred Pharmacy:   [address] [place] [address]  [facility] [address] [place] [id number]  Phone: [phone] Fax: [address] [facility] [address] [place] [address]  Phone: [phone] Fax: [address] [facility] ,[address] [place] [address]  Phone: [phone] Fax: [phone]    Primary Care Provider: Mariah Orellana MD    Primary Insurance: [facility]  Secondary Insurance: [facility]:  Active Health Care Proxies    There are no active Health Care Proxies on file  Obs Notice Signed: 23    CM reviewed Obs notice with pt and her daughter  CM offered to get a  for pt   Pt and daughter report pt is able to understand CM and pt doesn't need a  at this time  Pt and daughter had no questions about Obs notice

## 2023-04-26 NOTE — DISCHARGE SUMMARY
[address] [facility]  Discharge- [facility] 1950, 71 y o  female MRN: [id number]  Unit/Bed#: [facility] 216-01 Encounter: [id number]  Primary Care Provider: Mariam Bangura MD   Date and time admitted to hospital: 4/25/2023  9:51 AM    * Vertigo  Assessment & Plan  · Reports vertigo for multiple months  · Appears to be positional but seems to be worse when standing  · ECHO is unremarkable, showing LV EF of 92%; systolic and diastolic function are normal; trivial pericardial effusion, and dilated left atrium  · CTA Head & Neck shows no acute intracranial abnormality or large vessel occlusion or dissection  Shows unchanged chronic infarcts of left PCA territory and lacunar infarcts, and unchanged severe focal stenosis of left PCA and moderate focal stenosis in left MCA most likely d/t atherosclerotic disease  · Orthostatics negative   · No events on tele   · Refer PT/OT outpatient  · Encourage fluids outpatient    Elevated serum creatinine  Assessment & Plan  · Baseline appears to have normal creatinine  · Elevation may be secondary to possible CKD due to DM  · BMP from AM shows Cr WNL at 1 14  · UA is overall unremarkable with trace protein possibly d/t CKD, no bacteria or WBC  · Encourage fluids outpatient  · Resume home anti-HTN medications    Shortness of breath  Assessment & Plan  · On admission, reported chronic and worsening shortness of breath with minimal exertion  · Has a history of chronic asthma but has no significant cough or wheezing at this time  · CXR is stable from prior, no significant pathology  · Negative procalcitonin, BNP, trops  · ECHO shows LV EF of 38%; systolic and diastolic function are normal; trivial pericardial effusion, and dilated left atrium  · Can consider outpatient stress testing  Syncope  Assessment & Plan  · Daughter reports syncopal events at home, with rapid turn to consciousness    · No symptoms to suggest a post ictal state   · Telemetry is unremarkable for arrhythmias, BNP is negative, ECHO was unremarkable  Syncopal episodes unlikely to be cardiac in nature      Diabetes mellitus type 2, noninsulin dependent [facility])  Assessment & Plan  Lab Results   Component Value Date    HGBA1C 6 1 (H) 04/28/2022       Recent Labs     04/25/23  1600 04/25/23  2110 04/26/23  0634 04/26/23  1117   POCGLU 128 120 113 154*       Blood Sugar Average: Last 72 hrs:  · (P) 128 75  · Resume home rx of [place]     Morbid obesity ([facility] [address] )  Assessment & Plan  · BMI is 38  · Weight loss counseling when stable as an outpatient    Mild intermittent asthma without complication  Assessment & Plan  · Appears stable today  · Continue breo inhaler outpatient    History of CVA (cerebrovascular accident)  Assessment & Plan  · With residual left sided weakness  · Continue Brillinta and statin  · Refer to PT/OT outpatient    Medical Problems     Resolved Problems  Date Reviewed: 4/26/2023   None       Discharging Physician / Practitioner: Lillie Calixto PA-C  PCP: Pearl Perea MD  Admission Date:   Admission Orders (From admission, onward)     Ordered        04/25/23 1405  Place in Observation  Once                      Discharge Date: 04/26/23    Consultations During Hospital Stay:  · none    Procedures Performed:   · None    Significant Findings / Test Results:   · CT head shows No acute intracranial abnormality, Unchanged chronic infarct in left PCA territory involving left parasagittal parietal-occipital junction and left medial occipital lobes, Unchanged chronic lacunar infarcts in right basal ganglia, bilateral corona radiata, and right thalamus, Moderate chronic microangiopathy  · CTA Head and neck is negative for large vessel occlusion or dissection, shows unchanged severe focal stenosis in left PCA P2 segment likely due to atherosclerotic disease, Unchanged moderate focal stenosis in left MCA M2 branch vessel in origin of left vertebral artery likely due to atherosclerotic disease, Unchanged 0 3 cm infundibulum in expected origin of left posterior communicating artery  · ECHO shows mildy increased LV wall thickness, LV EF of 65%, trivial pericardial effusion, and normal systolic and diastolic function  · Elevated Cr  · Negative orthostatics  · No events on telemetry     Incidental Findings:   · None    Test Results Pending at Discharge (will require follow up): · None     Outpatient Tests Requested:  · None    Complications:  None     Reason for Admission: vertigo and syncope    Hospital Course:   Portia Belle is a 71 y o  female patient with a past medical history of asthma, diabetes, hypertension, and prior CVA with residual left sided weakness who originally presented to the hospital on 4/25/2023 due to multiple syncopal episodes and vertigo that is worse with standing for the past few months  Pt also presented with episodes of shortness of breath with exertion  CTA head and neck with and without contrast was unremarkable for any acute intracranial abnormalities or large vessel occlusion or dissection  CXR was also received in ED to evaluate SOB, but showed no acute cardiopulmonary disease  She was admitted for further evaluation of different etiologies for syncopal episodes with concomitant dyspnea with exertion  ECHO was unremarkable with normal EF of 65% and normal diastolic and systolic function  Patient was monitored overnight on telemetry, which showed no sign of arrhythmias  She was continued on IVF  Patient is tolerating food and water and is eager to be discharged  She is not complaining of any worsening vertigo with position  Patient is following PT outpatient for residual left sided weakness from CVA  Plan of care was explained to patient and daughter who was at bedside who expressed agreement and understanding  Please see above list of diagnoses and related plan for additional information       Condition at Discharge: "good    Discharge Day Visit / Exam:   Subjective:  Pt was seen and examined today  She seemed comfortable, sitting up in bed eating her food  She has no new complaitns today  Is eager to discharged  Denies dizziness, chest pain, SOB  Vitals: Blood Pressure: 160/75 (04/26/23 1117)  Pulse: 89 (04/26/23 1117)  Temperature: 97 7 °F (36 5 °C) (04/26/23 0800)  Temp Source: Oral (04/26/23 0800)  Respirations: 18 (04/26/23 0800)  Height: 5' 1\" (154 9 cm) (04/26/23 1050)  Weight - Scale: 91 6 kg (202 lb) (04/26/23 1050)  SpO2: 98 % (04/26/23 1117)  Exam:   Physical Exam  Constitutional:       General: She is not in acute distress  HENT:      Head: Normocephalic and atraumatic  Eyes:      General: No scleral icterus  Extraocular Movements: Extraocular movements intact  Cardiovascular:      Rate and Rhythm: Normal rate and regular rhythm  Heart sounds: Normal heart sounds  Pulmonary:      Effort: Pulmonary effort is normal       Breath sounds: Normal breath sounds  No rales  Abdominal:      General: Abdomen is flat  Bowel sounds are normal       Palpations: Abdomen is soft  Musculoskeletal:         General: No swelling  Right lower leg: No edema  Left lower leg: No edema  Neurological:      Mental Status: She is alert and oriented to person, place, and time  Psychiatric:         Mood and Affect: Mood normal          Behavior: Behavior normal          Thought Content: Thought content normal           Discussion with Family: Updated  (daughter) at bedside  Discharge instructions/Information to patient and family:   See after visit summary for information provided to patient and family  Provisions for Follow-Up Care:  See after visit summary for information related to follow-up care and any pertinent home health orders  Disposition:   Home    Planned Readmission: none     Discharge Statement:  I spent 32 minutes discharging the patient   This time was spent on the " day of discharge  I had direct contact with the patient on the day of discharge  Greater than 50% of the total time was spent examining patient, answering all patient questions, arranging and discussing plan of care with patient as well as directly providing post-discharge instructions  Additional time then spent on discharge activities  Discharge Medications:  See after visit summary for reconciled discharge medications provided to patient and/or family        **Please Note: This note may have been constructed using a voice recognition system**

## 2023-04-26 NOTE — ASSESSMENT & PLAN NOTE
· Baseline appears to have normal creatinine  · Elevation may be secondary to possible CKD due to DM  · BMP from AM shows Cr WNL at 1 14  · UA is overall unremarkable with trace protein possibly d/t CKD, no bacteria or WBC    · Encourage fluids outpatient  · Resume home anti-HTN medications

## 2023-04-26 NOTE — ASSESSMENT & PLAN NOTE
· Daughter reports syncopal events at home, with rapid turn to consciousness  · No symptoms to suggest a post ictal state  · Telemetry is unremarkable for arrhythmias, BNP is negative, ECHO was unremarkable  Syncopal episodes unlikely to be cardiac in nature

## 2023-04-26 NOTE — ASSESSMENT & PLAN NOTE
· On admission, reported chronic and worsening shortness of breath with minimal exertion  · Has a history of chronic asthma but has no significant cough or wheezing at this time  · CXR is stable from prior, no significant pathology  · Negative procalcitonin, BNP, trops  · ECHO shows LV EF of 60%; systolic and diastolic function are normal; trivial pericardial effusion, and dilated left atrium  · Can consider outpatient stress testing

## 2023-04-26 NOTE — ASSESSMENT & PLAN NOTE
Lab Results   Component Value Date    HGBA1C 6 1 (H) 04/28/2022       Recent Labs     04/25/23  1600 04/25/23  2110 04/26/23  0634 04/26/23  1117   POCGLU 128 120 113 154*       Blood Sugar Average: Last 72 hrs:  · (P) 128 75  · Resume home rx of [place]

## 2023-04-26 NOTE — OCCUPATIONAL THERAPY NOTE
Occupational Therapy Evaluation     Patient Name: Marino Fontenot  RIUWG'X Date: 2023  Problem List  Principal Problem:    Vertigo  Active Problems:    History of CVA (cerebrovascular accident)    Mild intermittent asthma without complication    Morbid obesity ([address] )    Diabetes mellitus type 2, noninsulin dependent (HCC)    Syncope    Shortness of breath    Elevated serum creatinine    Past Medical History  Past Medical History:   Diagnosis Date    Diabetes mellitus ([facility] )     Hypertension     Stroke [facility])      Past Surgical History  Past Surgical History:   Procedure Laterality Date     SECTION      x2           23 0909   OT Last Visit   OT Visit Date 23   Note Type   Note type Evaluation   Pain Assessment   Pain Assessment Tool 0-10   Pain Score No Pain   Restrictions/Precautions   Weight Bearing Precautions Per Order No   Other Precautions Fall Risk  (primarily [place] speaking, understands some [place])   Home Living   Type of Home Apartment   Home Layout One level;Elevator   Bathroom Shower/Tub Tub/shower unit   Bathroom Toilet Standard   Bathroom Equipment Grab bars in shower;Grab bars around toilet; Shower chair   Home Equipment Walker;Quad cane  (primarily used rollator PTA)   Prior Function   Level of [place] Needs assistance with ADLs; Needs assistance with IADLS   Lives With Spouse   Receives Help From Family   IADLs Family/Friend/Other provides meals; Family/Friend/Other provides transportation; Family/Friend/Other provides medication management   Falls in the last 6 months 1 to 4  (4-5 per pt report)   Vocational Retired   Comments Dtr comes daily - dtr is pt's caregiver   Lifestyle   Autonomy PTA, pt requires dtr A with ADLs, IADLs, rollator for fnxl mobility, (-)    Reciprocal Relationships Dtr,    Service to Others Retired/did not work   Intrinsic Gratification TV   ADL   Where Assessed Edge of bed   Eating Assistance 7  Independent   Grooming Assistance [address]  5  Supervision/Setup   LB Bathing Assistance 4  Minimal Assistance   UB Dressing Assistance 5  Supervision/Setup   LB Dressing Assistance 4  Minimal Assistance   Toileting Assistance  5  Supervision/Setup   Bed Mobility   Supine to Sit 5  Supervision   Additional items HOB elevated; Bedrails; Increased time required   Sit to Supine 5  Supervision   Additional items HOB elevated; Bedrails; Increased time required   Additional Comments BP layin/65, sitting 122/79, standing 110/72  Pt with no s/s or c/o dizzines/lightheadedness t/o session   Transfers   Sit to Stand 5  Supervision   Additional items Increased time required   Stand to Sit 5  Supervision   Additional items Increased time required   Additional Comments transfers with rollator   Functional Mobility   Functional Mobility 5  Supervision   Additional items   (rollator)   Balance   Static Sitting Fair +   Dynamic Sitting Fair   Static Standing Fair   Dynamic Standing Fair -   Ambulatory Fair -   Activity Tolerance   Activity Tolerance Patient tolerated treatment well   Medical Staff Made Aware PT Imelda   Nurse Made Aware RN clearance for session   RUE Assessment   RUE Assessment WFL   LUE Assessment   LUE Assessment WFL   Cognition   Overall Cognitive Status WFL   Arousal/Participation Responsive; Cooperative   Attention Within functional limits   Orientation Level Oriented X4   Memory Within functional limits   Following Commands Follows one step commands without difficulty   Comments Pt pleasant and cooperative t/o session  RN able to provide some translation and pt able to understand some English   Assessment   Assessment Pt is a 71 y o  female admitted to [facility] on 2023 w/ Vertigo, syncope   has a past medical history of Diabetes mellitus ([facility] [address] ), Hypertension, and Stroke ([facility] [address] )  Pt with active OT orders and up and OOB as tolerated orders   Pt seen as a co-evaluation with PT due to the patient's co-morbidities, clinically unstable presentation/clinical complexity, and present impairments  As per pt report, pta, resides with her  in a 1STA, elevator access, 0STE  Pt reports A w/  ADLS and IADLS, (-) drove  Upon evaluation, pt currently requires S with rollator for transfers and mobility  Pt currently requires I eating, I grooming, S UB ADLs, MIN A LB ADLs, and S toileting  Pt dtr is her primary caregiver  Able to assist as needed  From OT standpoint, recommendation would be home with social support  No further acute OT needs  D/C OT  Please re-consult if needed  Thank you  Pt was left after session with all current needs met  The patient's raw score on the AM-PAC Daily Activity Inpatient Short Form is 20  A raw score of greater than or equal to 19 suggests the patient may benefit from discharge to home  Please refer to the recommendation of the Occupational Therapist for safe discharge planning     Plan   OT Frequency Eval only   Recommendation   OT Discharge Recommendation No rehabilitation needs   AM-PAC Daily Activity Inpatient   Lower Body Dressing 3   Bathing 3   Toileting 3   Upper Body Dressing 3   Grooming 4   Eating 4   Daily Activity Raw Score 20   Daily Activity Standardized Score (Calc for Raw Score >=11) 42 03   AM-PAC Applied Cognition Inpatient   Following a Speech/Presentation 4   Understanding Ordinary Conversation 4   Taking Medications 4   Remembering Where Things Are Placed or Put Away 4   Remembering List of 4-5 Errands 4   Taking Care of Complicated Tasks 4   Applied Cognition Raw Score 24   Applied Cognition Standardized Score 62 21     Lela Baer MS, OTR/L

## 2023-04-26 NOTE — UTILIZATION REVIEW
"Initial Clinical Review    Admission: Date/Time/Statement:   Admission Orders (From admission, onward)     Ordered        04/25/23 1405  Place in Observation  Once                      Orders Placed This Encounter   Procedures   • Place in Observation     Standing Status:   Standing     Number of Occurrences:   1     Order Specific Question:   Level of Care     Answer:   Med Surg [16]     ED Arrival Information     Expected   -    Arrival   4/25/2023 09:44    Acuity   Urgent            Means of arrival   Walk-In    Escorted by   Family Member    Service   Hospitalist    Admission type   Emergency            Arrival complaint   dizziness           Chief Complaint   Patient presents with   • Dizziness     Pt's daughter reports more frequent dizziness causing her to black out; she states that during her PT sessions her HR drops and cardiology cannot see her until the end of May       Initial Presentation: 71 y o  female with a PMH of multiple who presents with dizziness, syncope and shortness of breath  Her daughter is translating, she reports that the patient has been experiencing chronic dizziness for months  She has a history of CVA in the past  Dizziness is worse with standing  Does not change with position when lying down or turning to the side  She also reports syncopal events which she describes as \"blacking out\" She does admit to loss of consciousness and falling to the ground, last episode was 1 week ago  She denies any pronged LOC or alteration of mental status after the LOC  Finally she also reports chronic shortness of breath with exertion  She reports difficulty with distances in her home and has to stop and catch her breath  She denies cough, sputum, fever or chills  ADMIT OBSERVATION STATUS  Med surg telemetry, PT OT eval, consider MRI brain, give 500 ml IVF, check ambulating pulse ox and orthostatics, check BMP, BNP, serial trops and echo  Hold home DM med and start accuchecks w/ SSI      Date:     " Day 2:      ED Triage Vitals [04/25/23 0948]   Temperature Pulse Respirations Blood Pressure SpO2   97 8 °F (36 6 °C) 101 18 150/76 100 %      Temp Source Heart Rate Source Patient Position - Orthostatic VS BP Location FiO2 (%)   Temporal Monitor Lying Left arm --      Pain Score       No Pain          Wt Readings from Last 1 Encounters:   03/08/23 91 9 kg (202 lb 8 oz)     Additional Vital Signs:   Date/Time Temp Pulse Resp BP MAP (mmHg) SpO2 O2 Device Patient Position - Orthostatic VS   04/26/23 03:02:34 98 2 °F (36 8 °C) 103 14 146/74 98 97 % -- Lying   04/25/23 23:23:28 98 3 °F (36 8 °C) 75 15 126/58 81 96 % -- Lying   04/25/23 20:00:46 98 2 °F (36 8 °C) 83 -- 143/79 100 96 % -- --   04/25/23 1847 98 °F (36 7 °C) 87 20 -- -- -- -- --   04/25/23 14:45:10 97 6 °F (36 4 °C) 119 Abnormal  20 114/81 92 98 % None (Room air) Sitting   04/25/23 1415 -- 96 20 125/65 87 98 % None (Room air) Lying   04/25/23 1412 -- -- -- 125/65 -- -- -- Standing - Orthostatic VS   04/25/23 1411 -- -- -- 127/67 -- -- -- Sitting - Orthostatic VS   04/25/23 1410 -- -- -- 133/72 -- -- -- Lying - Orthostatic VS   04/25/23 1203 -- 84 20 135/87 102 98 % None (Room air) Lying   04/25/23 1115 -- 80 18 132/59 85 97 % None (Room air) Lying   04/25/23 0948 97 8 °F (36 6 °C) 101 18 150/76 -- 100 % None (Room air) Lying       Pertinent Labs/Diagnostic Test Results:  4/26 ECHO:      4/25 EKG: NSR     CTA head and neck with and without contrast   Final Result by Yoselin Sequeira MD (04/25 1245)      CT HEAD   -No acute intracranial abnormality    -Unchanged chronic infarct in left PCA territory involving left parasagittal parietal-occipital junction and left medial occipital lobes  -Unchanged chronic lacunar infarcts in right basal ganglia, bilateral corona radiata, and right thalamus    -Moderate chronic microangiopathy        CTA HEAD AND NECK   -Negative CTA head and neck for large vessel occlusion or dissection    -Unchanged severe focal stenosis in left PCA P2 segment likely due to atherosclerotic disease      -Unchanged moderate focal stenosis in left MCA M2 branch vessel in origin of left vertebral artery likely due to atherosclerotic disease    -Unchanged 0 3 cm infundibulum in expected origin of left posterior communicating artery  Additional chronic/incidental findings as detailed above  Workstation performed: [id number]         XR chest 1 view portable   ED Interpretation by Ian Andrade DO (04/25 1241)   No acute cardiopulmonary process      Final Result by Carly Hart MD (04/26 2952)      No acute cardiopulmonary disease                    Workstation performed: [id number]               Results from last 7 days   Lab Units 04/26/23  0513 04/25/23  1537 04/25/23  1015   WBC Thousand/uL 11 70*  --  12 08*   HEMOGLOBIN g/dL 12 0  --  13 2   HEMATOCRIT % 37 4  --  43 6   PLATELETS Thousands/uL 300 326 363   NEUTROS ABS Thousands/µL  --   --  9 69*         Results from last 7 days   Lab Units 04/26/23  0513 04/25/23  1015   SODIUM mmol/L 138 137   POTASSIUM mmol/L 3 7 3 8   CHLORIDE mmol/L 109* 108   CO2 mmol/L 23 22   ANION GAP mmol/L 6 7   BUN mg/dL 11 11   CREATININE mg/dL 1 14 1 35*   EGFR ml/min/1 73sq m 48 39   CALCIUM mg/dL 8 8 9 1     Results from last 7 days   Lab Units 04/25/23  1015   AST U/L 18   ALT U/L 20   ALK PHOS U/L 151*   TOTAL PROTEIN g/dL 8 7*   ALBUMIN g/dL 3 4*   TOTAL BILIRUBIN mg/dL 0 80     Results from last 7 days   Lab Units 04/26/23  0634 04/25/23  2110 04/25/23  1600   POC GLUCOSE mg/dl 113 120 128     Results from last 7 days   Lab Units 04/26/23  0513 04/25/23  1015   GLUCOSE RANDOM mg/dL 116 173*             No results found for: BETA-HYDROXYBUTYRATE                   Results from last 7 days   Lab Units 04/25/23  1537 04/25/23  1203 04/25/23  1015   HS TNI 0HR ng/L  --   --  3   HS TNI 2HR ng/L  --  2  --    HSTNI D2 ng/L  --  -1  --    HS TNI 4HR ng/L 3  --   --    HSTNI D4 ng/L 0  --   --                  Results from last 7 days   Lab Units 04/25/23  1015   PROCALCITONIN ng/ml 0 07                 Results from last 7 days   Lab Units 04/25/23  1015   NT-PRO BNP pg/mL 65                             Results from last 7 days   Lab Units 04/25/23  2317   CLARITY UA  Clear   COLOR UA  Light Yellow   SPEC GRAV UA  1 044*   PH UA  6 0   GLUCOSE UA mg/dl Negative   KETONES UA mg/dl Negative   BLOOD UA  Negative   PROTEIN UA mg/dl Trace*   NITRITE UA  Negative   BILIRUBIN UA  Negative   UROBILINOGEN UA (BE) mg/dl <2 0   LEUKOCYTES UA  Negative   WBC UA /hpf 1-2   RBC UA /hpf None Seen   BACTERIA UA /hpf None Seen   EPITHELIAL CELLS WET PREP /hpf Occasional   MUCUS THREADS  Occasional*                                               ED Treatment:   Medication Administration from 04/25/2023 0944 to 04/25/2023 1436       Date/Time Order Dose Route Action     04/25/2023 1218 EDT iohexol (OMNIPAQUE) 350 MG/ML injection (SINGLE-DOSE) 85 mL 85 mL Intravenous Given        Past Medical History:   Diagnosis Date   • Diabetes mellitus ([facility] )    • Hypertension    • Stroke [facility])      Present on Admission:  • Vertigo  • Mild intermittent asthma without complication  • Morbid obesity (HCC)  • Diabetes mellitus type 2, noninsulin dependent (HCC)      Admitting Diagnosis: Dizziness [R42]  Lightheadedness [R42]  Syncope [R55]  Dyspnea [R06 00]  Age/Sex: 71 y o  female  Admission Orders:  Scheduled Medications:  albuterol, 2 puff, Inhalation, BID  amLODIPine, 10 mg, Oral, Daily  aspirin, 81 mg, Oral, Daily  atorvastatin, 40 mg, Oral, Daily With Dinner  docusate sodium, 100 mg, Oral, BID  fluticasone-vilanterol, 1 puff, Inhalation, Daily  heparin (porcine), 5,000 Units, Subcutaneous, Q8H KHOI  insulin lispro, 1-6 Units, Subcutaneous, TID AC  insulin lispro, 1-6 Units, Subcutaneous, HS  lisinopril, 10 mg, Oral, Daily  ticagrelor, 90 mg, Oral, Q12H KHOI      Continuous IV Infusions:     PRN Meds:  acetaminophen, 650 mg, Oral, Q6H PRN  calcium carbonate, 1,000 mg, Oral, Daily PRN  ondansetron, 4 mg, Intravenous, Q6H PRN  senna, 2 tablet, Oral, Daily PRN    scd    Network Utilization Review Department  ATTENTION: Please call with any questions or concerns to [phone] and carefully listen to the prompts so that you are directed to the right person  All voicemails are confidential   Jas Dalton all requests for admission clinical reviews, approved or denied determinations and any other requests to dedicated fax number below belonging to the campus where the patient is receiving treatment   List of dedicated fax numbers for the Facilities:  [address] DENIALS (Administrative/Medical Necessity) [phone]   [address] (Maternity/NICU/Pediatrics) [phone]   913 Steffi Jeff [phone]   Vinicius Mendoza 77 [phone]   [address] [address] Anand Galdamez 28 [phone]   [facility] [place] Oralia Chamberlain [facility] 134 815 [facility] [phone]

## 2023-04-26 NOTE — DISCHARGE INSTR - AVS FIRST PAGE
Cardiac work up was normal   Ultrasound results of the heart are pending, you will be called with these results  Chest x-ray was normal   CT scan of your head and neck showed old strokes, but nothing new  Follow up with your primary care provider on discharge

## 2023-04-27 NOTE — ED ATTENDING ATTESTATION
4/25/2023  ISimran DO, saw and evaluated the patient  I have discussed the patient with the resident/non-physician practitioner and agree with the resident's/non-physician practitioner's findings, Plan of Care, and MDM as documented in the resident's/non-physician practitioner's note, except where noted  All available labs and Radiology studies were reviewed  I was present for key portions of any procedure(s) performed by the resident/non-physician practitioner and I was immediately available to provide assistance  At this point I agree with the current assessment done in the Emergency Department  I have conducted an independent evaluation of this patient a history and physical is as follows:      80-year-old female with a history of previous CVA left-sided weakness and hypertension presents for dizziness  Onset was a month ago  Worse with walking  Over the past day she is had difficulty with gait  Not walking safely no falls  Is going to PT for stroke and has been having episodes of syncope recently  These are nonexertional   No chest pain no other associated symptoms on exam she has a normal exam other than slight ataxia  No murmurs normal rhythm  EKG interpreted by as normal sinus rhythm without any ST changes    Plan is CT head labs likely require admission for amatory dysfunction    ED Course         Critical Care Time  Procedures

## 2023-04-28 ENCOUNTER — EVALUATION (OUTPATIENT)
Dept: PHYSICAL THERAPY | Facility: CLINIC | Age: 73
End: 2023-04-28

## 2023-04-28 DIAGNOSIS — R53.1 LEFT-SIDED WEAKNESS: Primary | ICD-10-CM

## 2023-04-28 DIAGNOSIS — Z86.73 HISTORY OF CVA (CEREBROVASCULAR ACCIDENT): ICD-10-CM

## 2023-04-28 DIAGNOSIS — Z74.09 IMPAIRED FUNCTIONAL MOBILITY, BALANCE, GAIT, AND ENDURANCE: ICD-10-CM

## 2023-04-28 NOTE — PROGRESS NOTES
"PT RE-evaluation    Today's date: 2023  Patient name: Nba Lloyd  : 1950  MRN: [id number]  Referring provider: Emmanuel Perry MD  Dx:   Encounter Diagnosis     ICD-10-CM    1  Left-sided weakness  R53 1       2  History of CVA (cerebrovascular accident)  Z86 73       3  Impaired functional mobility, balance, gait, and endurance  Z74 09           Start Time: 930  Stop Time: 101  Total time in clinic (min): 40 minutes    Subjective: 23: \"Recent admission on  regarding vertigo and syncopal episodes  Regarding syncopal episodes, per hospital note: \"Telemetry is unremarkable for arrhythmias, BNP is negative, ECHO was unremarkable  Syncopal episodes unlikely to be cardiac in nature\"  Chest X-ray stable no changes regarding shortness of breath  Daughter here to translate during today's session  Reports that she fell this AM no head strike or injury, last time she passed out was roughly 1-2 weeks ago  Eliana Foil this morning when she tried to get dressed without having any help  Per daughter, reports that it is essentially a constant feeling of dizziness, and increases when she puts her head down  3/22/23: \"Stroke in 2022, daughter states that her balance has been getting sore and is lopsided  Daughter states that she doesn't try things if she can't do it  \"Needs help with everything\"  A few falls a few months ago  Difficulty with getting up  Eliana Foil getting up from a chair and in the bathroom  L side weakness, uses SBQC for ambulation, uses it all the time since stroke  In the house holds onto the wall and does not use the cane  States that her L hand is weak and shakes       balance has been worse and she does clearly have worsened left-sided strength compared with her prior norm\"      Objective: See treatment diary below    Imaging from recent recent hospital admission stay:   • \"CT head shows No acute intracranial abnormality, Unchanged chronic infarct in left PCA territory " "involving left parasagittal parietal-occipital junction and left medial occipital lobes, Unchanged chronic lacunar infarcts in right basal ganglia, bilateral corona radiata, and right thalamus, Moderate chronic microangiopathy  • CTA Head and neck is negative for large vessel occlusion or dissection, shows unchanged severe focal stenosis in left PCA P2 segment likely due to atherosclerotic disease, Unchanged moderate focal stenosis in left MCA M2 branch vessel in origin of left vertebral artery likely due to atherosclerotic disease, Unchanged 0 3 cm infundibulum in expected origin of left posterior communicating artery  • ECHO shows mildy increased LV wall thickness, LV EF of 65%, trivial pericardial effusion, and normal systolic and diastolic function     • Elevated Cr  • Negative orthostatics  No events on telemetry\"      Manual Muscle Testing - Hip Left Right   Flexion 4- 4   Abduction 3+ 4   External Rotation 4- 4+      Manual Muscle Testing - Knee Left Right   Flexion 3+ 4+   Extension 4+ 4-      Manual Muscle Testing - Ankle Left Right   Doriflexion 4+ 4   Plantarflexion NT NT            Coordination Left Right   Heel To Shin       Finger To Nose impaired intact   Rapid Alternating Supination impaired intact   Alt toe tapping impaired intact         Sensation Left Right   Light Touch diminished intact         Muscle Tone (Modified Manish Scale**) Left Right   Hamstring 1 (guarding/unable to relax) 0   Gastroc 1 0   Quad 1 0   **  0 = no increase in muscle tone  1 = slight increase in muscle tone, minimal resistance at the end of ROM when moved  1+ = minimal resistance throughout the remainder (less than half) of ROM when moved  2 =  Davis increase in muscle tone through most of the ROM, but still moved easily  3 = considerable increase in muscle tone, movement difficult  4 = affects parts rigid         Balance Test Initial Eval  4/28 RE       5x Sit to Stand: 21 53 sec no UE's  18 44 seconds no UE      " TU 6 sec with SBQC  16 2 sec without AD, CGA/min A  26 13 seconds w SBQC CGAx1 via GB       Gait Speed:  0 51m/s with SBQC  0 34 m/s with SQBC       2 Minute Walk Test: 130 feet before requiring seated rest break, 20 seconds left  , SpO2 99%  122 feet full test  123 bpm, 99% Spo2        6 Minute Walk Test: NT         FGA:  NT              Assessment: Annalisa Jackman is a 71year old patient presenting to OPPT for re-evaluaiton secondary to inpatient hospital stay and change of status from -  Annalisa Jackman shows decreased overall endurance, functional mobility compared to initial evaluation as well as increased fall risk via TUG score and drop in gait speed using SBQC  Likely secondary to decreased participation in home exercises and activity as well as recent hospitalization  No change in strength, sensation, or tone from IE  Multiple times throughout today's session patient unable to participate in intended exercises secondary to intense dizziness once arising from chair despite no change in vitals  Also tachycardic at rest  Patient is having increased amounts of falls at home likely secondary to impulsiveness regarding abilities as well as dizziness reports that she feels like she is bound to black out  Patient will benefit from continued skilled OPPT in order to address impairments continually found during today's session and previous sessions as well as to achieve goals previously set during initial evaluation in order to reduce fall risk and likelihood of hospital re-admission  Strongly encouraged patient to move up cardiology appointment in order to seek that care for further evaluation  As well as encouraged to seek further medical treatment and evaluation if sensations of blacking out or passing out continue or worsen  May benefit from holding PT until cleared by cardiology in order to improve safety during exercise sessions  Goals  ST   Pt will improve gait speed to at least 0 60m/s with least restrictive device within 4 weeks needed to improve safety with ambulation  - NOT MET  2  Pt will improve TUG score by at least 3 seconds within 4 weeks needed to reduce fall risk  - NOT MET  3  Pt will improve 5xSTS score by at least 3 seconds within 4 weeks needed to reduce fall risk  - MET  4  Pt will improve 6MWT distance to completing at least 300 feet before requiring seated rest break in 4 weeks to improve endurance - NOT MET  5  Pt will demonstrate independence with HEP within 4 weeks  - NOT MET    LT  Pt will improve gait speed to at least 0 7 m/s with least restrictive device within 8 weeks needed to improve safety with ambulation  2  Pt will improve TUG score to at least 13 seconds within 8 weeks needed to reduce fall risk  3  Pt will improve 5xSTS score to <15 sec within 8 weeks needed to reduce fall risk  4  Pt will improve 6MWT distance to at least 450 feet within 8 weeks to improve endurance and functional moblity  5  Pt will demonstrate independence with HEP within 8 weeks  Plan: Continue per plan of care  Progress treatment as tolerated  Continue skilled PT services as able depending on dizziness and feeling that patient gets that she is going to pass out        Precautions: asthma, HTN, DMII  220-71=  149 x  80 = 119 Bpm    Manuals 3/23 3/27 3/29 4/5 4/6 4/12 4/26        Taken in beginning  Bp 132/68  HR 87  02 97%   In beginning Bp 132/70  HR 75  02 99% In beginning of session  Bp 130/68   In beginning of session   /72      (manual regularly irregular rhythm)   O2 96%     Durin/74     Post 136/70     HR 104bpm O2 99%                                      Neuro Re-Ed       Functional outcome measures    Sidesteps  In // bars (short) x 2 laps with BUE support In // bars (long) x 2 laps with 2-finger support on bars    HR to 124-130 //bars (long) 2 finger support  X 2 laps      C/o dz //bars (short) 2 finger support x 2 laps     02 98%  C/o "Dz     HKM   // bars (long)  With BUE support, x 1 lap  -126 //bars (long) B/L UE support x 2 laps         hurdles     //bars (short) B/L UE support 4\" hurdles (3)  Fwd x 2 laps    02 99%    Lat 2 x 1 lap  HR after 1st lap 130  02 98%    After 2nd lap    02 98%  Quick recovery once seated to below      backwards   // bars (long) x 2 laps  HR to 130 //bars (long) x 1 5 laps    C/o Dz      Step up    //bars (long) 4\" step x 10 ea rest break between LEs   - 129                                                          Ther Ex          nustep  L2 for 2 minute intervals, total of 3 intervals with 1-2 minute rest breaks between    -120, SpO2 96-97%   L2 for 2 minute intervals, total of 4 intervals with 1 minute rest break between    HR   97-98%  RPE 8/10   L2 for 2 min intervals for a total of 10 min          HR 87 - 92  02 97 -98%    RPE 2/10    C/o Dz L4  @ 4 min HR 86  02 93%    @7 min   02 98%    @ 10 min   HR 86  02 98%  Breaks at each 4 min, 7 min and 10 min  Total 10 min L4 @ 2 min HR 95  02 98%    @6 min HR 88  02 92%\    @ 10 min   HR 95  02 95%    Breaks between each set     Sit to stand  No UE's from chair 2 x 5 reps  HR to 112-123 SpO2 98% No UE's from chair 2 x 5 reps No UE use  From chair x 10 No UE use  From chair 2x10  1st 10  02 99%  2nd 10   02 98%                                   Ther Activity                              Gait Training With SBQC x 2 trials with CGA/CS With SBQC, 175 feet with CGA    SpO2 98% // bars (no UE's) x 3 laps    -119  97% //bars (long)  No UE use  X 3 laps    02 99% //bars (short) B/L UE support    02 98%                         Modalities          Education Fall risk, use of AD, adjustment of AD, using AD in house instead of furniture surfing      POC development, education to patient and daughter regarding seeking further care if she continues to have these blacking out sx x10 mins "

## 2023-05-03 ENCOUNTER — OFFICE VISIT (OUTPATIENT)
Dept: PHYSICAL THERAPY | Facility: CLINIC | Age: 73
End: 2023-05-03

## 2023-05-03 DIAGNOSIS — Z74.09 IMPAIRED FUNCTIONAL MOBILITY, BALANCE, GAIT, AND ENDURANCE: ICD-10-CM

## 2023-05-03 DIAGNOSIS — R53.1 LEFT-SIDED WEAKNESS: Primary | ICD-10-CM

## 2023-05-03 DIAGNOSIS — Z86.73 HISTORY OF CVA (CEREBROVASCULAR ACCIDENT): ICD-10-CM

## 2023-05-03 NOTE — PROGRESS NOTES
"Daily Note     Today's date: 5/3/2023  Patient name: Rozina Rollins  : 1950  MRN: [id number]  Referring provider: Daryle Gelineau, MD  Dx:   Encounter Diagnosis     ICD-10-CM    1  Left-sided weakness  R53 1       2  History of CVA (cerebrovascular accident)  Z86 73       3  Impaired functional mobility, balance, gait, and endurance  Z74 09           Start Time: 1105  Stop Time: 1120  Total time in clinic (min): 15 minutes    Subjective: Patient reports that she continues to feel dizzy, lightheaded, have black spots and feels like she will faint  Happens every day  Objective: See treatment diary below      Assessment: Session was limited due to patient's subjective symptoms of feeling lightheaded, dizzy, and overall not feeling well  BP was WNL, however HR was elevated, tachycardic in the 110's-120's at rest  When getting up from waiting room, patient took multiple attempts due to once standing was feeling very lightheaded and needed to sit down  Patient ambulated about 15 feet into therapy clinic and requested to sit down, saying \"I need to go home and lay down  \" Discussed with patient and patient's daughter that with elevated HR at rest and increasing significantly with slight exercise, recommended placing patient on hold at this time until she follows up with cardiology, and recommended if she continues to have significant symptoms to contact PCP and seek urgent care if necessary  Plan: place on 30 day medical hold       Precautions: asthma, HTN, DMII  220-71=  149 x  80 = 119 Bpm    Manuals 3/23 3/27 3/29 4/5 4/6 4/12 4/26  5/3       Taken in beginning  Bp 132/68  HR 87  02 97%   In beginning Bp 132/70  HR 75  02 99% In beginning of session  Bp 130/68   In beginning of session   /72      (manual regularly irregular rhythm)   O2 96%     Durin/74     Post 136/70     HR 104bpm O2 99%      HR at start of session 142/86    HR   SPO2 97-99%                          " "          Neuro Re-Ed       Functional outcome measures     Sidesteps  In // bars (short) x 2 laps with BUE support In // bars (long) x 2 laps with 2-finger support on bars    HR to 124-130 //bars (long) 2 finger support  X 2 laps      C/o dz //bars (short) 2 finger support x 2 laps     02 98%  C/o Dz      HKM   // bars (long)  With BUE support, x 1 lap  -126 //bars (long) B/L UE support x 2 laps          hurdles     //bars (short) B/L UE support 4\" hurdles (3)  Fwd x 2 laps    02 99%    Lat 2 x 1 lap  HR after 1st lap 130  02 98%    After 2nd lap    02 98%  Quick recovery once seated to below       backwards   // bars (long) x 2 laps  HR to 130 //bars (long) x 1 5 laps    C/o Dz       Step up    //bars (long) 4\" step x 10 ea rest break between LEs   - 129                                                                Ther Ex           nustep  L2 for 2 minute intervals, total of 3 intervals with 1-2 minute rest breaks between    -120, SpO2 96-97%   L2 for 2 minute intervals, total of 4 intervals with 1 minute rest break between    HR   97-98%  RPE 8/10   L2 for 2 min intervals for a total of 10 min          HR 87 - 92  02 97 -98%    RPE 2/10    C/o Dz L4  @ 4 min HR 86  02 93%    @7 min   02 98%    @ 10 min   HR 86  02 98%  Breaks at each 4 min, 7 min and 10 min  Total 10 min L4 @ 2 min HR 95  02 98%    @6 min HR 88  02 92%\    @ 10 min   HR 95  02 95%    Breaks between each set      Sit to stand  No UE's from chair 2 x 5 reps  HR to 112-123 SpO2 98% No UE's from chair 2 x 5 reps No UE use  From chair x 10 No UE use  From chair 2x10  1st 10  02 99%  2nd 10   02 98%                                       Ther Activity                                 Gait Training With SBQC x 2 trials with CGA/CS With SBQC, 175 feet with CGA    SpO2 98% // bars (no UE's) x 3 laps    -119  97% //bars (long)  No UE use  X 3 laps    02 " 99% //bars (short) B/L UE support    02 98%                            Modalities           Education Fall risk, use of AD, adjustment of AD, using AD in house instead of furniture surfing      POC development, education to patient and daughter regarding seeking further care if she continues to have these blacking out sx x10 mins Vital monitoring, education on follow up with physician and cardiology, seeking further care if necessary, assisted to car

## 2023-05-08 ENCOUNTER — APPOINTMENT (OUTPATIENT)
Dept: PHYSICAL THERAPY | Facility: CLINIC | Age: 73
End: 2023-05-08
Payer: COMMERCIAL

## 2023-05-10 ENCOUNTER — APPOINTMENT (OUTPATIENT)
Dept: PHYSICAL THERAPY | Facility: CLINIC | Age: 73
End: 2023-05-10
Payer: COMMERCIAL

## 2023-05-11 ENCOUNTER — APPOINTMENT (OUTPATIENT)
Dept: PHYSICAL THERAPY | Facility: CLINIC | Age: 73
End: 2023-05-11
Payer: COMMERCIAL

## 2023-05-15 ENCOUNTER — APPOINTMENT (OUTPATIENT)
Dept: PHYSICAL THERAPY | Facility: CLINIC | Age: 73
End: 2023-05-15
Payer: COMMERCIAL

## 2023-05-16 ENCOUNTER — APPOINTMENT (OUTPATIENT)
Dept: PHYSICAL THERAPY | Facility: CLINIC | Age: 73
End: 2023-05-16
Payer: COMMERCIAL

## 2023-05-17 ENCOUNTER — APPOINTMENT (OUTPATIENT)
Dept: PHYSICAL THERAPY | Facility: CLINIC | Age: 73
End: 2023-05-17
Payer: COMMERCIAL

## 2023-05-22 ENCOUNTER — APPOINTMENT (OUTPATIENT)
Dept: PHYSICAL THERAPY | Facility: CLINIC | Age: 73
End: 2023-05-22
Payer: COMMERCIAL

## 2023-05-22 NOTE — PROGRESS NOTES
"Outpatient Consultation - General Cardiology   [facility] 71 y o  female   MRN: [id number]  Encounter: [id number]      PCP: Anu Abdi MD    History of Present Illness   Physician Requesting Consult: Consults   Reason for Consult / Principal Problem: Heart Rate    HPI: [facility] is a 71y o  year old female with history of CVA, HTN, DM2 who presents to our office for evaluation of lightheadedness, dizziness and overall unease  Patient was working with PT early in May 2023, during a visit her HR was elevated in the 110's-120's at rest  When getting up from the waiting room she took multiple attempts because she keep feeling very lightheaded and needed to sit down  Patient ambulated about 15 feet into therapy clinic and requested to sit down, saying \"I need to go home and lay down  \"     Patients daughter is present during office visit who provides translation for us  Patient states that she feels dizzy when turning her head left/right and gets the sensation of the room spinning  This makes her feel nauseous and uneasy  She often feels applications as well right around the time of the dizzy spells but it is not predictable  She states this all started happening after she had her stroke last year  TTE obtained on 23 with an EF of 65%  [facility]  Review of Systems  Review of system was conducted and was negative except for as stated in the HPI        Historical Information   Past Medical History:   Diagnosis Date   • Diabetes mellitus ([facility] [address] )    • Hypertension    • Stroke [facility])      Past Surgical History:   Procedure Laterality Date   •  SECTION      x2     Social History     Substance and Sexual Activity   Alcohol Use No     Social History     Substance and Sexual Activity   Drug Use No     Social History     Tobacco Use   Smoking Status Never   • Passive exposure: Never   Smokeless Tobacco Never     Family History:   Family History   Problem Relation Age of Onset   • Stroke Father  " Meds/Allergies   Home Medications:   Current Outpatient Medications:   •  albuterol (PROVENTIL HFA,VENTOLIN HFA) 90 mcg/act inhaler, Inhale 2 puffs 2 (two) times a day, Disp: , Rfl:   •  amLODIPine (NORVASC) 10 mg tablet, Take 10 mg by mouth in the morning, Disp: , Rfl:   •  aspirin 81 mg chewable tablet, TAKE 1 TABLET (81 MG TOTAL) DAILY, Disp: 90 tablet, Rfl: 0  •  atorvastatin (LIPITOR) 40 mg tablet, Take 40 mg by mouth, Disp: , Rfl:   •  Blood Glucose Monitoring Suppl (ACCU-CHEK YOUSUF CONNECT) w/Device KIT, Use 1 Device daily, Disp: , Rfl:   •  Breo Ellipta 200-25 MCG/ACT inhaler, , Disp: , Rfl:   •  glucose blood test strip, 1 strip by In Vitro route daily, Disp: , Rfl:   •  Jentadueto XR 2 5-1000 MG TB24, , Disp: , Rfl:   •  Lancets MISC, 1 each by Does not apply route daily, Disp: , Rfl:   •  lisinopril (ZESTRIL) 10 mg tablet, Take 10 mg by mouth, Disp: , Rfl:   •  ticagrelor (BRILINTA) 90 MG, Take 1 tablet (90 mg total) by mouth every 12 (twelve) hours, Disp: 180 tablet, Rfl: 0    No Known Allergies      Objective   Vitals: There were no vitals taken for this visit  Physical Exam  GEN: [employer] appears well, alert and oriented x 3, pleasant and cooperative   HEENT:  Normocephalic, atraumatic, anicteric, moist mucous membranes  NECK: No JVD or carotid bruits   HEART: reg rhythm, reg rate, normal S1 and S2, no murmurs, clicks, gallops or rubs   LUNGS: Clear to auscultation bilaterally; no wheezes, rales, or rhonchi; respiration nonlabored   ABDOMEN:  Normoactive bowel sounds, soft, no tenderness, no distention  EXTREMITIES: peripheral pulses palpable; no edema  NEURO: no gross focal findings; cranial nerves grossly intact   SKIN:  Dry, intact, warm to touch    Lab Results: I have personally reviewed pertinent lab results      Lab Results   Component Value Date    HSTNI0 3 04/25/2023    HSTNI2 2 04/25/2023    HSTNI4 3 04/25/2023     Lab Results   Component Value Date    NTBNP 65 04/25/2023 Lab Results   Component Value Date    CHOL 178 06/12/2015    TRIG 135 04/28/2022    HDL 42 (L) 04/28/2022    LDLCALC 29 04/28/2022    LDLDIRECT 112 06/12/2015     Lab Results   Component Value Date     06/15/2015    K 3 7 04/26/2023    CO2 23 04/26/2023     (H) 04/26/2023    BUN 11 04/26/2023    CREATININE 1 14 04/26/2023    ALT 20 04/25/2023    AST 18 04/25/2023     Lab Results   Component Value Date    WBC 11 70 (H) 04/26/2023    HGB 12 0 04/26/2023    HCT 37 4 04/26/2023    MCV 94 04/26/2023     04/26/2023     Lab Results   Component Value Date    INR 1 03 01/29/2022    INR 0 93 06/11/2015         Imaging: I have personally reviewed pertinent reports  ECHO:  Results for orders placed during the hospital encounter of 04/25/23    Interpretation Summary  •  This was a technically difficult study  •  Left Ventricle: Left ventricular cavity size is normal  Wall thickness is mildly increased  The left ventricular ejection fraction is 65%  Systolic function is normal   •  Right Ventricle: Right ventricular cavity size is normal  Systolic function is normal   •  Pericardium: There is a trivial pericardial effusion  •  Prior TTE study available for comparison  Prior study date: 1/30/2022  No significant changes noted compared to the prior study  Assessment/Plan     Assessment:    1  Lightheadedness / Dizziness, sounds like Vertigo (describes a sensation of room spinning  Could be in the setting of her prior CVA)   2  CVA  3  DM2  4  HTN  --> BP in office 100/58 with HR 97 bpm    Plan:  - f/u Zio Patch  - Start Meclizine 25mg PO TID  - Stop Amlodipine 10mg PO Daily  --> States she has occasionally noted systolic BP's in the 97U and feels lightheaded during these episodes  - c/w Lipitor 40mg PO QHS  - c/w Lisinopril 10mg PO Daily  - Advised daughter to keep track of her BP at home (2x a day x1 week) and give me a call  to review results  Will adjust antihypertensive regimen accordingly  Case discussed and reviewed with Dr Lisy Arciniega who agrees with my assessment and plan  Thank you for involving us in the care of your patient  Shanita Chen MD  Cardiology Fellow PGY-6    ==========================================================================================    Epic/ Allscripts/Care Everywhere records reviewed:     ** Please Note: Fluency DirectDictation voice to text software may have been used in the creation of this document   **

## 2023-05-23 ENCOUNTER — OFFICE VISIT (OUTPATIENT)
Dept: CARDIOLOGY CLINIC | Facility: CLINIC | Age: 73
End: 2023-05-23

## 2023-05-23 VITALS
HEART RATE: 97 BPM | HEIGHT: 61 IN | DIASTOLIC BLOOD PRESSURE: 58 MMHG | WEIGHT: 197.4 LBS | BODY MASS INDEX: 37.27 KG/M2 | OXYGEN SATURATION: 97 % | SYSTOLIC BLOOD PRESSURE: 100 MMHG

## 2023-05-23 DIAGNOSIS — R00.2 INTERMITTENT PALPITATIONS: ICD-10-CM

## 2023-05-23 DIAGNOSIS — I69.398 VERTIGO AS LATE EFFECT OF CEREBROVASCULAR ACCIDENT (CVA): Primary | ICD-10-CM

## 2023-05-23 DIAGNOSIS — R42 VERTIGO: ICD-10-CM

## 2023-05-23 DIAGNOSIS — E66.01 MORBID OBESITY (HCC): ICD-10-CM

## 2023-05-23 DIAGNOSIS — N18.31 STAGE 3A CHRONIC KIDNEY DISEASE (HCC): ICD-10-CM

## 2023-05-23 DIAGNOSIS — R42 VERTIGO AS LATE EFFECT OF CEREBROVASCULAR ACCIDENT (CVA): Primary | ICD-10-CM

## 2023-05-23 RX ORDER — MECLIZINE HYDROCHLORIDE 25 MG/1
25 TABLET ORAL EVERY 8 HOURS SCHEDULED
Qty: 90 TABLET | Refills: 0 | Status: SHIPPED | OUTPATIENT
Start: 2023-05-23

## 2023-05-24 ENCOUNTER — APPOINTMENT (OUTPATIENT)
Dept: PHYSICAL THERAPY | Facility: CLINIC | Age: 73
End: 2023-05-24
Payer: COMMERCIAL

## 2023-05-31 ENCOUNTER — APPOINTMENT (OUTPATIENT)
Dept: PHYSICAL THERAPY | Facility: CLINIC | Age: 73
End: 2023-05-31
Payer: COMMERCIAL

## 2023-06-21 ENCOUNTER — CLINICAL SUPPORT (OUTPATIENT)
Dept: CARDIOLOGY CLINIC | Facility: CLINIC | Age: 73
End: 2023-06-21
Payer: COMMERCIAL

## 2023-06-21 DIAGNOSIS — R00.2 INTERMITTENT PALPITATIONS: ICD-10-CM

## 2023-06-21 PROCEDURE — 93248 EXT ECG>7D<15D REV&INTERPJ: CPT | Performed by: INTERNAL MEDICINE

## 2023-07-20 ENCOUNTER — TELEPHONE (OUTPATIENT)
Dept: CARDIOLOGY CLINIC | Facility: CLINIC | Age: 73
End: 2023-07-20

## 2023-07-20 NOTE — TELEPHONE ENCOUNTER
----- Message from Makayla Hernandez sent at 7/20/2023  9:31 AM EDT -----    ----- Message -----  From: Joan Ray  Sent: 7/20/2023   8:33 AM EDT  To: Makayla Hernandez      ----- Message -----  From: Lucien Olivo DO  Sent: 7/19/2023  12:22 PM EDT  To: Cardiology [place] [facility]    Patient is [place] speaking only. I am covering for Dr. Dorita Campo today. The patient's zio came back there were short episodes of elevated heart rates starting from the top of the heart (atrial tachycardia) however these are not usually dangerous. She did not report these were symptomatic thus I would withhold therapy at this time given that she is already experiencing dizziness and any medications may exacerbate her dizziness. I do recommend patient establish with a new cardiologist for a check up as Dr. Dorita Campo is no longer here. Can we please relay this to the patient? Thanks a lot.

## 2023-07-20 NOTE — TELEPHONE ENCOUNTER
Daughter called went over results, and advised needs a follow up appt with a cardiologist due to Dr that he had no longer in the practice.  Verbally understood and will sen too schedulers to set up appt

## 2023-07-27 NOTE — PROGRESS NOTES
Cardiology Follow Up    Normal  1950  [id number]  [address] [facility]  [phone]    1. Vertigo as late effect of cerebrovascular accident (CVA)  meclizine (ANTIVERT) 25 mg tablet    DISCONTINUED: meclizine (ANTIVERT) 25 mg tablet      2. Hypertension, unspecified type        3. Atrial tachycardia ([address])        4. History of CVA (cerebrovascular accident)            Interval History:   Ms Nuñez was seen by Dr Flash Nagy on 5/23/23. She complained of lightheadedness, dizziness and the need to sit down. 14 day Zio patch ordered which showed short runs of atrial tachycardia. Dr Flash Nagy reviewed and felt medication treatment may worsen symptoms of dizziness. It was recommended she establish care with a new cardiologist.      Vanita Alvarez presents to our office for a follow up visit. She is accompanied by her daughter who is interpreting for her. Vanita Alvarez denies dyspnea with exertion or CP. She admits to lightheadedness and dizziness with standing.       Medical History   Primary seen by Dr Flash Nagy  Hypertension  DM2 HgbA1C  CVA left sided weakness     Patient Active Problem List   Diagnosis   • History of CVA (cerebrovascular accident)   • Left-sided weakness   • Mild intermittent asthma without complication   • Morbid obesity ([address])   • Vertigo   • Diabetes mellitus type 2, noninsulin dependent ([address])   • Migraine   • Primary hypertension   • Stroke-like symptoms   • Dysarthria as late effect of stroke   • Hemiplegia and hemiparesis following cerebral infarction affecting left non-dominant side (HCC)   • Syncope   • Shortness of breath   • Elevated serum creatinine   • Stage 3a chronic kidney disease ([address])     Past Medical History:   Diagnosis Date   • Diabetes mellitus ([address])    • Hypertension    • Stroke [facility])      Social History     Socioeconomic History   • Marital status: /Civil Union     Spouse name: Not on file   • Number of children: Not on file   • Years of education: Not on file   • Highest education level: Not on file   Occupational History   • Not on file   Tobacco Use   • Smoking status: Never     Passive exposure: Never   • Smokeless tobacco: Never   Vaping Use   • Vaping Use: Never used   Substance and Sexual Activity   • Alcohol use: No   • Drug use: No   • Sexual activity: Not on file   Other Topics Concern   • Not on file   Social History Narrative   • Not on file     Social Determinants of Health     Financial Resource Strain: Not on file   Food Insecurity: Not on file   Transportation Needs: Not on file   Physical Activity: Not on file   Stress: Not on file   Social Connections: Not on file   Intimate Partner Violence: Not on file   Housing Stability: Not on file      Family History   Problem Relation Age of Onset   • Stroke Father      Past Surgical History:   Procedure Laterality Date   •  SECTION      x2       Current Outpatient Medications:   •  albuterol (PROVENTIL HFA,VENTOLIN HFA) 90 mcg/act inhaler, Inhale 2 puffs 2 (two) times a day, Disp: , Rfl:   •  aspirin 81 mg chewable tablet, TAKE 1 TABLET (81 MG TOTAL) DAILY, Disp: 90 tablet, Rfl: 0  •  atorvastatin (LIPITOR) 40 mg tablet, Take 40 mg by mouth, Disp: , Rfl:   •  Blood Glucose Monitoring Suppl (ACCU-CHEK YOUSUF CONNECT) w/Device KIT, Use 1 Device daily, Disp: , Rfl:   •  Breo Ellipta 200-25 MCG/ACT inhaler, , Disp: , Rfl:   •  glucose blood test strip, 1 strip by In Vitro route daily, Disp: , Rfl:   •  Jentadueto XR 2.5-1000 MG TB24, , Disp: , Rfl:   •  Lancets MISC, 1 each by Does not apply route daily, Disp: , Rfl:   •  lisinopril (ZESTRIL) 10 mg tablet, Take 10 mg by mouth, Disp: , Rfl:   •  meclizine (ANTIVERT) 25 mg tablet, Take 1 tablet (25 mg total) by mouth every 8 (eight) hours, Disp: 90 tablet, Rfl: 0  •  ticagrelor (BRILINTA) 90 MG, Take 1 tablet (90 mg total) by mouth every 12 (twelve) hours, Disp: 180 tablet, Rfl: 0  No Known Allergies    Labs:  No visits with results within 2 Month(s) from this visit.    Latest known visit with results is:   Admission on 04/25/2023, Discharged on 04/26/2023   Component Date Value   • Ventricular Rate 04/25/2023 88    • Atrial Rate 04/25/2023 88    • WV Interval 04/25/2023 148    • QRSD Interval 04/25/2023 72    • QT Interval 04/25/2023 376    • QTC Interval 04/25/2023 454    • P Axis 04/25/2023 49    • QRS Axis 04/25/2023 17    • T Wave Axis 04/25/2023 268    • WBC 04/25/2023 12.08 (H)    • RBC 04/25/2023 4.56    • Hemoglobin 04/25/2023 13.2    • Hematocrit 04/25/2023 43.6    • MCV 04/25/2023 96    • MCH 04/25/2023 28.9    • MCHC 04/25/2023 30.3 (L)    • RDW 04/25/2023 12.8    • MPV 04/25/2023 12.0    • Platelets 75/74/2634 363    • nRBC 04/25/2023 0    • Neutrophils Relative 04/25/2023 81 (H)    • Immat GRANS % 04/25/2023 1    • Lymphocytes Relative 04/25/2023 12 (L)    • Monocytes Relative 04/25/2023 4    • Eosinophils Relative 04/25/2023 2    • Basophils Relative 04/25/2023 0    • Neutrophils Absolute 04/25/2023 9.69 (H)    • Immature Grans Absolute 04/25/2023 0.06    • Lymphocytes Absolute 04/25/2023 1.50    • Monocytes Absolute 04/25/2023 0.52    • Eosinophils Absolute 04/25/2023 0.26    • Basophils Absolute 04/25/2023 0.05    • Sodium 04/25/2023 137    • Potassium 04/25/2023 3.8    • Chloride 04/25/2023 108    • CO2 04/25/2023 22    • ANION GAP 04/25/2023 7    • BUN 04/25/2023 11    • Creatinine 04/25/2023 1.35 (H)    • Glucose 04/25/2023 173 (H)    • Calcium 04/25/2023 9.1    • Corrected Calcium 04/25/2023 9.6    • AST 04/25/2023 18    • ALT 04/25/2023 20    • Alkaline Phosphatase 04/25/2023 151 (H)    • Total Protein 04/25/2023 8.7 (H)    • Albumin 04/25/2023 3.4 (L)    • Total Bilirubin 04/25/2023 0.80    • eGFR 04/25/2023 39    • hs TnI 0hr 04/25/2023 3    • hs TnI 2hr 04/25/2023 2    • Delta 2hr hsTnI 04/25/2023 -1    • hs TnI 4hr 04/25/2023 3    • Delta 4hr hsTnI 04/25/2023 0    • Procalcitonin 04/25/2023 0.07    • NT-proBNP 04/25/2023 65    • Platelets 25/86/2834 326    • MPV 04/25/2023 12.0    • Color, UA 04/25/2023 Light Yellow    • Clarity, UA 04/25/2023 Clear    • Specific Gravity, UA 04/25/2023 1.044 (H)    • pH, UA 04/25/2023 6.0    • Leukocytes, UA 04/25/2023 Negative    • Nitrite, UA 04/25/2023 Negative    • Protein, UA 04/25/2023 Trace (A)    • Glucose, UA 04/25/2023 Negative    • Ketones, UA 04/25/2023 Negative    • Urobilinogen, UA 04/25/2023 <2.0    • Bilirubin, UA 04/25/2023 Negative    • Occult Blood, UA 04/25/2023 Negative    • RBC, UA 04/25/2023 None Seen    • WBC, UA 04/25/2023 1-2    • Epithelial Cells 04/25/2023 Occasional    • Bacteria, UA 04/25/2023 None Seen    • MUCUS THREADS 04/25/2023 Occasional (A)    • Hyaline Casts, UA 04/25/2023 0-3 (A)    • A4C EF 04/26/2023 62    • LVIDd 04/26/2023 2.50    • LVIDS 04/26/2023 2.50    • IVSd 04/26/2023 1.20    • LVPWd 04/26/2023 2.40    • FS 04/26/2023 0    • MV E' Tissue Velocity Se* 04/26/2023 7    • E wave deceleration time 04/26/2023 229    • MV Peak E Bob 04/26/2023 62    • MV Peak A Bob 04/26/2023 1    • RVID d 04/26/2023 2.8    • Tricuspid annular plane * 04/26/2023 2.00    • LA size 04/26/2023 3.7    • LA/Ao Ratio 2D 04/26/2023 1.54    • MV stenosis pressure 1/2* 04/26/2023 67    • MV valve area p 1/2 meth* 04/26/2023 3.28    • Ao root 04/26/2023 2.40    • Asc Ao 04/26/2023 2.9    • Left ventricular stroke * 04/26/2023 0.00    • IVS 04/26/2023 1.2    • LEFT VENTRICLE SYSTOLIC * 93/96/5452 22    • LV DIASTOLIC VOLUME (MOD* 76/00/1542 22    • Left Atrium Area-systoli* 04/26/2023 17.6    • Left Atrium Area-systoli* 04/26/2023 20.1    • LVSV, 2D 04/26/2023 0    • LV EF 04/26/2023 65    • POC Glucose 04/25/2023 128    • POC Glucose 04/25/2023 120    • Sodium 04/26/2023 138    • Potassium 04/26/2023 3.7    • Chloride 04/26/2023 109 (H)    • CO2 04/26/2023 23    • ANION GAP 04/26/2023 6    • BUN 04/26/2023 11    • Creatinine 04/26/2023 1.14    • Glucose 04/26/2023 116    • Calcium 04/26/2023 8.8    • eGFR 04/26/2023 48    • WBC 04/26/2023 11.70 (H)    • RBC 04/26/2023 3.97    • Hemoglobin 04/26/2023 12.0    • Hematocrit 04/26/2023 37.4    • MCV 04/26/2023 94    • MCH 04/26/2023 30.2    • MCHC 04/26/2023 32.1    • RDW 04/26/2023 12.8    • Platelets 20/04/6443 300    • MPV 04/26/2023 11.8    • POC Glucose 04/26/2023 113    • POC Glucose 04/26/2023 154 (H)      Imaging: No results found. Review of Systems:  Review of Systems   Musculoskeletal: Positive for arthralgias, gait problem and myalgias. Using a cane to assist with ambulation    Neurological: Positive for dizziness and light-headedness. All other systems reviewed and are negative. Physical Exam:  Physical Exam  Vitals reviewed. Constitutional:       Appearance: She is obese. Cardiovascular:      Rate and Rhythm: Normal rate and regular rhythm. Pulses: Normal pulses. Heart sounds: Normal heart sounds. Pulmonary:      Effort: Pulmonary effort is normal.      Breath sounds: Normal breath sounds. Musculoskeletal:         General: Normal range of motion. Cervical back: Normal range of motion and neck supple. Right lower leg: No edema. Left lower leg: No edema. Skin:     General: Skin is warm and dry. Capillary Refill: Capillary refill takes less than 2 seconds. Neurological:      Mental Status: She is alert and oriented to person, place, and time. Motor: Weakness present. Comments: Left sided weakness    Psychiatric:         Mood and Affect: Mood normal.         Behavior: Behavior normal.         Discussion/Summary:  # Vertigo- lightheadedness and dizziness improved with taking meclizine, ran out. I have refilled Meclizine during this office visit. Orthostatic BP negative during this office visit. Follow up with PCP probable vertigo and may benefit from PT.     # Hypertension LUE sitting 116/64, standing 120/70. Continue Lisinopril 10mg daily DASH diet    # Atrial tachycardia short runs, continue to monitor symptoms. Not started on AV Brenda blocker due to hx of lightheadedness and dizziness.     # Hx of CVA with left sided weakness continue on ASA 81mg daily, Brilinta 90mg Q12 hours and Lipitor 40mg daily

## 2023-07-31 ENCOUNTER — OFFICE VISIT (OUTPATIENT)
Dept: CARDIOLOGY CLINIC | Facility: CLINIC | Age: 73
End: 2023-07-31
Payer: COMMERCIAL

## 2023-07-31 VITALS
BODY MASS INDEX: 37.14 KG/M2 | HEIGHT: 61 IN | WEIGHT: 196.7 LBS | SYSTOLIC BLOOD PRESSURE: 122 MMHG | DIASTOLIC BLOOD PRESSURE: 70 MMHG | OXYGEN SATURATION: 96 % | HEART RATE: 83 BPM

## 2023-07-31 DIAGNOSIS — R42 VERTIGO AS LATE EFFECT OF CEREBROVASCULAR ACCIDENT (CVA): Primary | ICD-10-CM

## 2023-07-31 DIAGNOSIS — I10 HYPERTENSION, UNSPECIFIED TYPE: ICD-10-CM

## 2023-07-31 DIAGNOSIS — Z86.73 HISTORY OF CVA (CEREBROVASCULAR ACCIDENT): ICD-10-CM

## 2023-07-31 DIAGNOSIS — I47.1 ATRIAL TACHYCARDIA (HCC): ICD-10-CM

## 2023-07-31 DIAGNOSIS — I69.398 VERTIGO AS LATE EFFECT OF CEREBROVASCULAR ACCIDENT (CVA): Primary | ICD-10-CM

## 2023-07-31 PROCEDURE — 99214 OFFICE O/P EST MOD 30 MIN: CPT | Performed by: NURSE PRACTITIONER

## 2023-07-31 RX ORDER — MECLIZINE HYDROCHLORIDE 25 MG/1
25 TABLET ORAL EVERY 8 HOURS SCHEDULED
Qty: 90 TABLET | Refills: 3 | Status: SHIPPED | OUTPATIENT
Start: 2023-07-31

## 2023-07-31 RX ORDER — MECLIZINE HYDROCHLORIDE 25 MG/1
25 TABLET ORAL EVERY 8 HOURS SCHEDULED
Qty: 90 TABLET | Refills: 3 | Status: SHIPPED | OUTPATIENT
Start: 2023-07-31 | End: 2023-07-31 | Stop reason: SDUPTHER

## 2023-09-08 ENCOUNTER — OFFICE VISIT (OUTPATIENT)
Dept: NEUROLOGY | Facility: CLINIC | Age: 73
End: 2023-09-08

## 2023-09-08 VITALS
RESPIRATION RATE: 20 BRPM | DIASTOLIC BLOOD PRESSURE: 72 MMHG | OXYGEN SATURATION: 98 % | HEIGHT: 61 IN | HEART RATE: 119 BPM | TEMPERATURE: 98 F | BODY MASS INDEX: 36.95 KG/M2 | WEIGHT: 195.7 LBS | SYSTOLIC BLOOD PRESSURE: 130 MMHG

## 2023-09-08 DIAGNOSIS — R53.1 LEFT-SIDED WEAKNESS: ICD-10-CM

## 2023-09-08 DIAGNOSIS — Z86.73 HISTORY OF CVA (CEREBROVASCULAR ACCIDENT): Primary | ICD-10-CM

## 2023-09-08 DIAGNOSIS — E11.9 DIABETES MELLITUS TYPE 2, NONINSULIN DEPENDENT (HCC): ICD-10-CM

## 2023-09-08 DIAGNOSIS — I10 PRIMARY HYPERTENSION: ICD-10-CM

## 2023-09-08 NOTE — PATIENT INSTRUCTIONS
History of Stroke:    I had the pleasure of seeing Natalya in the office today at [facility] neurology [facility] in [facility]. She is presenting today for an office visit follow-up in regard to her history of CVA and also history of TIA at this time. Patient is not acknowledging any new strokelike symptoms at this time, although still has the same persistent dizziness and balance issues that she has been having for quite some time. Certainly possible that this could be a late effect of the patient's CVAs that she has had in the past, there is no significant new symptoms or new change to the dizziness that she has been having lately. Noted that she was seeing cardiology in regards to syncopal/blacking out events at this moment in time. They had done a Zio patch for the patient which had come back unrevealing although it was noted that the patient did have a few runs of atrial tachycardia and she is still currently following with a cardiologist at this time to further evaluate this and investigate it. She was provided by cardiology meclizine for her dizziness that she gets consistently throughout the day. Patient has residual deficits of left sided upper and lower extremity weakness and upper lower and extremity numbness. She has been doing well with some risk factors in regards to her blood pressure and her cholesterol, although she has been lacking any drive to do any activity or eat a healthy well-balanced meals. She had to stop participating in physical therapy due to the dizziness that she was experiencing. She does also not want to pursue any sleep study or pursue any additional help for her risk factors at this time.    -Did encourage the patient that she should get a diagnostic sleep study for further evaluation and see if she does have obstructive sleep apnea. Patient had denied this testing at this time and does not want to go through with this.   Patient understands the risks associated with not participating in the study and it can certainly lead to potential strokes or other cardiovascular risks in the future. -Do believe that it is important that we continue to encourage the patient to eat a healthy well-balanced diet and try to do as much activity that she can handle without feeling too too dizzy or feeling as though she is going to pass out. I believe that it is extremely important for the patient to take her diet and exercise seriously as it seems like this has been lacking at this time, and can definitely lead into more health problems with her diabetes and her cardiovascular disease risk as well. -Recommend that the patient check her blood pressure at home. She does have a blood pressure cuff at home but has not yet used it. Would recommend checking every day or every other day at this time for her blood pressure. Ideally should be less than 130/80, as she is noticing the systolic blood pressure is 140-150 on a consistent basis then she should talk to her cardiologist or primary care about her blood pressure medication.  -Continue to follow with cardiologist in regards to her issues with presyncope/syncope at this time. Continue to follow for any recommended additional cardiac monitoring that may need to take place.  -Encouraged that the patient can certainly take the meclizine as needed whenever she does feel dizzy or lightheaded. She can take 25 mg as needed every 8 hours for the dizziness. May be good to take the meclizine and then be able to participate in physical therapy again so that way the patient can try to stay active moving forward. - For ongoing stroke prevention continue: Aspirin 81 mg once daily, Brilinta 90 mg twice daily, Lipitor 40 mg once daily  - Discussed the importance of antiplatelet management with the patient to prevent future strokes.    - Recommend to check blood pressure occasionally away from the doctor's office to make sure that those numbers are typically less than 130/80. If they are frequently higher than that, we recommend checking a little more often and to follow up with primary care team   - Will defer to primary care team for monitoring of cholesterol panel and blood sugar numbers with target LDL cholesterol of less than 70 and hemoglobin A1c less than 7%  - Recommend following a low salt, mediterranean diet   - Recommend routine physical exercise as tolerated     We will plan for her to return to the office in 6-month time to see on of the APPs or Dr. Alyce Maldonado but would be happy to see her sooner if the need should arise. If she has any symptoms concerning for TIA or stroke including sudden painless loss of vision or double vision, difficulty speaking or swallowing, vertigo/room spinning that does not quickly resolve, or weakness/numbness/loss of coordination affecting 1 side of the face or body she should proceed by ambulance to the nearest emergency room immediately.

## 2023-09-08 NOTE — PROGRESS NOTES
Patient ID: Angelo Kerns is a 71 y.o. female who presents to the [address] [place] [address]. Assessment/Plan:    History of Stroke:    I had the pleasure of seeing Natalya in the office today at [facility] in [facility]. She is presenting today for an office visit follow-up in regard to her history of CVA and also history of TIA at this time. Patient is not acknowledging any new strokelike symptoms at this time, although still has the same persistent dizziness and balance issues that she has been having for quite some time. Certainly possible that this could be a late effect of the patient's CVAs that she has had in the past, there is no significant new symptoms or new change to the dizziness that she has been having lately. Noted that she was seeing cardiology in regards to syncopal/blacking out events at this moment in time. They had done a Zio patch for the patient which had come back unrevealing although it was noted that the patient did have a few runs of atrial tachycardia and she is still currently following with a cardiologist at this time to further evaluate this and investigate it. She was provided by cardiology meclizine for her dizziness that she gets consistently throughout the day. Patient has residual deficits of left sided upper and lower extremity weakness and upper lower and extremity numbness. She has been doing well with some risk factors in regards to her blood pressure and her cholesterol, although she has been lacking any drive to do any activity or eat a healthy well-balanced meals. She had to stop participating in physical therapy due to the dizziness that she was experiencing. She does also not want to pursue any sleep study or pursue any additional help for her risk factors at this time.    -Did encourage the patient that she should get a diagnostic sleep study for further evaluation and see if she does have obstructive sleep apnea.   Patient had denied this testing at this time and does not want to go through with this. Patient understands the risks associated with not participating in the study and it can certainly lead to potential strokes or other cardiovascular risks in the future. -Do believe that it is important that we continue to encourage the patient to eat a healthy well-balanced diet and try to do as much activity that she can handle without feeling too too dizzy or feeling as though she is going to pass out. I believe that it is extremely important for the patient to take her diet and exercise seriously as it seems like this has been lacking at this time, and can definitely lead into more health problems with her diabetes and her cardiovascular disease risk as well. -Recommend that the patient check her blood pressure at home. She does have a blood pressure cuff at home but has not yet used it. Would recommend checking every day or every other day at this time for her blood pressure. Ideally should be less than 130/80, as she is noticing the systolic blood pressure is 140-150 on a consistent basis then she should talk to her cardiologist or primary care about her blood pressure medication.  -Continue to follow with cardiologist in regards to her issues with presyncope/syncope at this time. Continue to follow for any recommended additional cardiac monitoring that may need to take place.  -Encouraged that the patient can certainly take the meclizine as needed whenever she does feel dizzy or lightheaded. She can take 25 mg as needed every 8 hours for the dizziness. May be good to take the meclizine and then be able to participate in physical therapy again so that way the patient can try to stay active moving forward. - For ongoing stroke prevention continue: Aspirin 81 mg once daily, Brilinta 90 mg twice daily, Lipitor 40 mg once daily  - Discussed the importance of antiplatelet management with the patient to prevent future strokes. - Recommend to check blood pressure occasionally away from the doctor's office to make sure that those numbers are typically less than 130/80. If they are frequently higher than that, we recommend checking a little more often and to follow up with primary care team   - Will defer to primary care team for monitoring of cholesterol panel and blood sugar numbers with target LDL cholesterol of less than 70 and hemoglobin A1c less than 7%  - Recommend following a low salt, mediterranean diet   - Recommend routine physical exercise as tolerated     We will plan for her to return to the office in 6-month time to see on of the APPs or Dr. Gisselle Lopez but would be happy to see her sooner if the need should arise. If she has any symptoms concerning for TIA or stroke including sudden painless loss of vision or double vision, difficulty speaking or swallowing, vertigo/room spinning that does not quickly resolve, or weakness/numbness/loss of coordination affecting 1 side of the face or body she should proceed by ambulance to the nearest emergency room immediately. Subjective:    HPI      For Review:    Patient was last seen in the office by Dr. Gisselle Lopez on 03/08/2023. Patient was presenting as a follow-up appointment for evaluation in regard to remote history of stroke and most recent suspected TIA. At the last appointment patient did not have any new strokelike symptoms she was taking her medications as prescribed. Patient did not report any side effects of the medication. Did report that the patient's balance has been worse and did clearly have worsened left-sided strength compared to her previous normal.  Suspected that this could be related to deconditioning, did not have any clear evidence of new stroke although the progression of symptoms were concerning at that time. Patient was still continuing on aspirin and and Brilinta for dual antiplatelet therapy.   Dual antiplatelet therapy was particularly favored at that time due to history of multiple prior strokes and intracranial stenosis/atherosclerosis. Was taking Lipitor for her hyperlipidemia and for secondary stroke prevention. Recommended that the patient should try to target hemoglobin A1c of less than 7% and LDL cholesterol less than 70 mg/dL. Did not require any repeat cerebrovascular imaging at that time. Felt as though that the patient would benefit from physical therapy of her left inside. Recommended to continue to follow with her ophthalmologist in regards to potential ocular complications with her diabetes. Recommended to frequently check her blood pressure away from the office, targeting a blood pressure of less than 130/80 for the most part. Interval History:      New stroke symptoms/residual symptoms:    Any new, sudden onset weakness, numbness, facial droop, slurred speech, difficulty speaking, trouble swallowing, persistent vertigo, or sudden double vision or vision loss? No new stroke like symptoms     Residual symptoms include: vertigo/balance or coordination issues and weakness of the left UE/LE: upper extremity and lower extremity    Stroke Etiology and Risk Factor modification:    Stroke risk factors were evaluated including: Hypertension, hyperlipidemia, diabetes mellitus, obesity, possible obstructive sleep apnea    AP/AC therapy: aspirin 81 mg once daily and Brillinta 90 mg twice daily at this time. No bleeding or bruising issues     Statin therapy: Lipitor 40 mg once daily     Blood pressure today and as of late: 130/72, does not take blood pressure at home. Most recent LDL: 29 mg/dL    Most recent hemoglobin A1C: 6.2%    Cardiology evaluation? Any cardiac monitoring required?: Cardiology following, recent syncopal/black out episodes, investigating with zio patch, was found to have been having atrial tachycardia     Endocrinology evaluation? Following proper glycemic treatment/diet?  No endocrinology, just following with primary care for diabetic management. Lifestyle history/modifications:    Diet/Exercise regimen: Not doing particularly well and following with any sort of diet plan her daughter states. Patient is eating what ever she would like at this time. Patient is very sedentary not participating in any activity at this time. Not performing or participating in any physical therapy due to her difficulty with dizziness and balance    Any physical therapy, occupational therapy or speech therapy performed/required at this time?:  No PT/OT currently. Any difficulty with sleep? Does not have any reported difficulty with sleep, patient's daughter reports some potential snoring/sleep apnea issues. Any history of sleep apnea apnea? CPAP compliance?: May have some snoring or sleep apnea issues. Does not want a sleep study or sleep apnea testing. Post-stroke depression/anxiety? Does take Prozac for depression/anxiety, states it helps but not sure if she is taking consistently     Any history of smoking?  Does not smoke, been exposed to secondary smoke for years       Lab Results   Component Value Date/Time    CHOLESTEROL 98 04/28/2022 09:16 AM     Lab Results   Component Value Date/Time    TRIG 135 04/28/2022 09:16 AM    TRIG 209 06/12/2015 05:27 AM     Lab Results   Component Value Date/Time    HDL 42 (L) 04/28/2022 09:16 AM    HDL 36 06/12/2015 05:27 AM     Lab Results   Component Value Date/Time    LDLCALC 29 04/28/2022 09:16 AM    LDLCALC 100 06/12/2015 05:27 AM       Lab Results   Component Value Date/Time    HGBA1C 6.2 (H) 06/15/2023 09:28 AM     Lab Results   Component Value Date/Time     06/15/2023 09:28 AM           Past Medical History:   Diagnosis Date   • Diabetes mellitus ([address])    • Hypertension    • Stroke ([address])        Current Outpatient Medications:   •  albuterol (PROVENTIL HFA,VENTOLIN HFA) 90 mcg/act inhaler, Inhale 2 puffs 2 (two) times a day, Disp: , Rfl:   •  aspirin 81 mg chewable tablet, TAKE 1 TABLET (81 MG TOTAL) DAILY, Disp: 90 tablet, Rfl: 0  •  atorvastatin (LIPITOR) 40 mg tablet, Take 40 mg by mouth, Disp: , Rfl:   •  Blood Glucose Monitoring Suppl (ACCU-CHEK YOUSUF CONNECT) w/Device KIT, Use 1 Device daily, Disp: , Rfl:   •  Breo Ellipta 200-25 MCG/ACT inhaler, , Disp: , Rfl:   •  glucose blood test strip, 1 strip by In Vitro route daily, Disp: , Rfl:   •  Lancets MISC, 1 each by Does not apply route daily, Disp: , Rfl:   •  lisinopril (ZESTRIL) 10 mg tablet, Take 10 mg by mouth, Disp: , Rfl:   •  meclizine (ANTIVERT) 25 mg tablet, Take 1 tablet (25 mg total) by mouth every 8 (eight) hours, Disp: 90 tablet, Rfl: 3  •  ticagrelor (BRILINTA) 90 MG, Take 1 tablet (90 mg total) by mouth every 12 (twelve) hours, Disp: 180 tablet, Rfl: 0     Objective:    Physical Exam:                                                                 Vitals:            Constitutional:    /72 (BP Location: Left arm, Patient Position: Sitting, Cuff Size: Standard)   Pulse (!) 119   Temp 98 °F (36.7 °C) (Temporal)   Resp 20   Ht 5' 1" (1.549 m)   Wt 88.8 kg (195 lb 11.2 oz)   SpO2 98%   BMI 36.98 kg/m²   BP Readings from Last 3 Encounters:   09/08/23 130/72   07/31/23 122/70   05/23/23 100/58     Pulse Readings from Last 3 Encounters:   09/08/23 (!) 119   07/31/23 83   05/23/23 97         Well developed, well nourished, well groomed. No dysmorphic features. Psychiatric:  Normal behavior and appropriate affect        Neurological Examination:     Mental status/cognitive function:   Orientated to time, place and person. Recent and remote memory intact. Attention span and concentration as well as fund of knowledge are appropriate for age. Normal language and spontaneous speech. Cranial Nerves:  II-visual fields full. Fundi poorly visualized due to pupillary constriction  III, IV, VI-Pupils were equal, round, and reactive to light and accomodation. Extraocular movements were full and conjugate without nystagmus. Conjugate gaze, normal smooth pursuits, normal saccades   V-facial sensation decreased sensation in the left side of face compared right side. VII-facial expression symmetric, intact forehead wrinkle, strong eye closure, symmetric smile    VIII-hearing grossly intact bilaterally   IX, X-palate elevation symmetric, no dysarthria. XI-shoulder shrug strength intact    XII-tongue protrusion midline. Motor Exam: symmetric bulk and tone throughout, no pronator drift. Power/strength 5/5 right upper and lower extremities, power/strength 4/5 of the left upper extremity and left lower extremity, decreased  strength of the left hand compared to the right. No atrophy, fasciculations or abnormal movements noted. Sensory: Diminished light touch sensation of the left upper and lower extremity compared to the right upper and lower extremity. Reflexes: brachioradialis 2+, biceps 2+, knee 2+, bilaterally  Coordination: Finger nose finger intact bilaterally, no apparent dysmetria, ataxia or tremor noted  Gait: Slightly unsteady gait, uses a cane and leans to her right side. ROS:    Review of Systems   Constitutional: Negative for appetite change, fatigue and fever. HENT: Negative. Negative for hearing loss, tinnitus, trouble swallowing and voice change. Eyes: Negative for photophobia, pain and visual disturbance. Respiratory: Negative. Negative for shortness of breath. Cardiovascular: Negative. Negative for palpitations. Gastrointestinal: Negative. Negative for nausea and vomiting. Endocrine: Negative. Negative for cold intolerance. Genitourinary: Negative. Negative for dysuria, frequency and urgency. Musculoskeletal: Negative for back pain, gait problem, myalgias and neck pain. Skin: Negative. Negative for rash. Allergic/Immunologic: Negative. Neurological: Positive for dizziness (almost all the time ).  Negative for tremors, seizures, syncope, facial asymmetry, speech difficulty, weakness, light-headedness, numbness and headaches. Hematological: Negative. Does not bruise/bleed easily. Psychiatric/Behavioral: Negative. Negative for confusion, hallucinations and sleep disturbance.          I have spent 30 minutes today on this case including chart review, performing history and exam, patient counseling, and documentation/communication      Divya Leahy PA-C  9/8/2023 10:22 AM

## 2023-09-08 NOTE — PROGRESS NOTES
Review of Systems   Constitutional: Negative for appetite change, fatigue and fever. HENT: Negative. Negative for hearing loss, tinnitus, trouble swallowing and voice change. Eyes: Negative for photophobia, pain and visual disturbance. Respiratory: Negative. Negative for shortness of breath. Cardiovascular: Negative. Negative for palpitations. Gastrointestinal: Negative. Negative for nausea and vomiting. Endocrine: Negative. Negative for cold intolerance. Genitourinary: Negative. Negative for dysuria, frequency and urgency. Musculoskeletal: Negative for back pain, gait problem, myalgias and neck pain. Skin: Negative. Negative for rash. Allergic/Immunologic: Negative. Neurological: Positive for dizziness (almost all the time ). Negative for tremors, seizures, syncope, facial asymmetry, speech difficulty, weakness, light-headedness, numbness and headaches. Hematological: Negative. Does not bruise/bleed easily. Psychiatric/Behavioral: Negative. Negative for confusion, hallucinations and sleep disturbance.

## 2023-10-31 ENCOUNTER — PATIENT OUTREACH (OUTPATIENT)
Dept: INTERNAL MEDICINE CLINIC | Facility: CLINIC | Age: 73
End: 2023-10-31

## 2023-10-31 NOTE — PROGRESS NOTES
SW returned nabil to aging worker Kelsey Kwong [phone] which said  SW was listed on her case . She later clarified she has has reached to and has a in home assessment set for  the PA Waiver planned for Monday 11/ 6/23. She denied she needed further assistance,. She shared that this is a re assessment as pt previously denied.    She denied need of further assistance

## 2023-11-01 ENCOUNTER — TELEPHONE (OUTPATIENT)
Dept: CARDIOLOGY CLINIC | Facility: CLINIC | Age: 73
End: 2023-11-01

## 2023-11-01 NOTE — TELEPHONE ENCOUNTER
Gery Claude from [facility] call center called stating they are patient's PCP. Patient told them she continues to take  Amlodipine even though it was D/C' d on 5/24/23. Pharmacy continues to dispense Amlodipine. [address] [facility] [phone] & spoke with Aleksandr, who stated they did continue to fill & dispense Amlodipine prescription for patient. Stated he will call patient to tell her to discontinue. Called patient using Language Dhaval Mcguire, [phone] who left a message for pt to D/C Amlodipine.     Called patients daughter & left a message to return call to office

## 2023-11-07 ENCOUNTER — OFFICE VISIT (OUTPATIENT)
Dept: CARDIOLOGY CLINIC | Facility: CLINIC | Age: 73
End: 2023-11-07
Payer: COMMERCIAL

## 2023-11-07 VITALS
DIASTOLIC BLOOD PRESSURE: 68 MMHG | HEART RATE: 82 BPM | WEIGHT: 192 LBS | OXYGEN SATURATION: 92 % | HEIGHT: 61 IN | SYSTOLIC BLOOD PRESSURE: 114 MMHG | BODY MASS INDEX: 36.25 KG/M2

## 2023-11-07 DIAGNOSIS — R42 VERTIGO: Primary | ICD-10-CM

## 2023-11-07 PROCEDURE — 99204 OFFICE O/P NEW MOD 45 MIN: CPT | Performed by: INTERNAL MEDICINE

## 2023-11-07 PROCEDURE — 99214 OFFICE O/P EST MOD 30 MIN: CPT | Performed by: INTERNAL MEDICINE

## 2023-11-07 NOTE — PROGRESS NOTES
Consultation - Cardiology   THE [facility] 68 y.o. female MRN: [id number]  Unit/Bed#:  Encounter: [id number]      Assessment/Plan: THE [facility] is a 68y.o. year old female history of stroke and most recently TIA, hypertension, hyperlipidemia, diabetes is presenting with recurrent bouts of dizziness to the cardiology office. Dizziness and presyncope: The daughter today states that her symptoms are not necessarily related to room spinning. She believes the meclizine has not really helped the patient with her symptoms. Patient states she feels dizzy and presyncope at different positions whether or not sitting standing and even just walking across the room. She had her symptoms while wearing her Zio patch, however her Zio patch was unrevealing for any conduction abnormalities. Showed normal sinus rhythm with low burden Of atrial tachycardia which would not correlate with her symptoms. I would not medically treat her low burden of a tach right now considering her symptoms don't correlate.   -orthostatics normal in the past   -normal ecg, echo, and zio   -doubt cardiovascular related and more likely related to her previous strokes  -normal bp and already she was stopped on her amlodipine and bp is normal today on lisinopril 10 mg daily. We could try coming down on her lisinopril to 5 mg to see if the patient has improvement in her symptoms with a higher normal BP.  -I reviewed her medications and the daughter states she is not taking asa 81 mg but per the neurology recommendations she should be on DAPT.  I instructed the patient about neurology's recommendations that she should be on brilinta and asa         History of Present Illness   Physician Requesting Consult: No att. providers found  Reason for Consult / Principal Problem: Dizziness  HPI: THE [facility] is a 68y.o. year old female history of stroke and most recently TIA, hypertension, hyperlipidemia, diabetes is presenting with recurrent bouts of dizziness to the cardiology office. The patient was seen by one of my colleagues in May 2023 for complaints of dizziness with some concern for possible room spinning and being related to vertigo. There was a question to have her blood pressure was too strictly controlled and her amlodipine was discontinued. She was started on meclizine for possible vertigo. Either of these interventions seem to have improved her symptoms. And her daughter states that she will have bouts of dizziness and presyncope feeling like she is about to pass out whether that is walking, standing, and sitting. She has not had any true malignant syncopal event. She cannot correlate any of her symptoms to anything in particular. She does not really ever have the symptoms when she is going from sitting to standing and the patient has had negative orthostatics in the past.  Patient's daughter states that she drinks a significant amount of fluids makes sure she is not dehydrated. Patient had her symptoms well while wearing her Zio patch and the patient was in normal sinus rhythm with no signs of worsening conduction disease. She just had some small runs of atrial tachycardia which would not correlate with her symptoms. The daughter and the patient are obviously understandably frustrated.     Consults    Review of Systems:  Review of Systems    14 systems reviewed and negative with the exception of the above and the following    Historical Information   Past Medical History:   Diagnosis Date    Diabetes mellitus ([address])     Hypertension     Stroke [facility])      Past Surgical History:   Procedure Laterality Date     SECTION      x2     Social History     Substance and Sexual Activity   Alcohol Use No     Social History     Substance and Sexual Activity   Drug Use No     Social History     Tobacco Use   Smoking Status Never    Passive exposure: Never   Smokeless Tobacco Never     Family History: non-contributory    Meds/Allergies   all current active meds have been reviewed  No Known Allergies    Objective   Vitals: Blood pressure 114/68, pulse 82, height 5' 1" (1.549 m), weight 87.1 kg (192 lb), SpO2 92 %. , Body mass index is 36.28 kg/m². ,     [unfilled]    Invasive Devices       None                       Physical Exam:  Physical Exam    Gen: No acute distress  HEENT: anicteric, mucous membranes moist  Neck: supple, no jugular venous distention, or carotid bruit  Heart: regular, normal s1 and s2, no murmur/rub or gallop  Lungs :clear to auscultation bilaterally, no rales/rhonchi or wheeze  Abdomen: soft nontender, normoactive bowel sounds, no organomegaly  Ext: warm and perfused, normal femoral pulses, no edema, clubbing  Skin: warm, no rashes  Neuro: AAO x 3, no focal findings  Psychiatric: normal affect  Musculoskeletal: no obvious joint deformities. Lab Results:     No results found for: "CKTOTAL", "CKMB", "CKMBINDEX", "TROPONINI"    Lab Results   Component Value Date    GLUCOSE 173 (H) 06/15/2015    CALCIUM 8.8 04/26/2023     06/15/2015    K 3.7 04/26/2023    CO2 23 04/26/2023     (H) 04/26/2023    BUN 11 04/26/2023    CREATININE 1.14 04/26/2023       Lab Results   Component Value Date    WBC 11.70 (H) 04/26/2023    HGB 12.0 04/26/2023    HCT 37.4 04/26/2023    MCV 94 04/26/2023     04/26/2023       Lab Results   Component Value Date    CHOL 178 06/12/2015     Lab Results   Component Value Date    HDL 42 (L) 04/28/2022    HDL 43 (L) 01/30/2022    HDL 35 (L) 06/28/2016     Lab Results   Component Value Date    LDLCALC 29 04/28/2022    LDLCALC 31 01/30/2022    LDLCALC 34 06/28/2016     Lab Results   Component Value Date    TRIG 135 04/28/2022    TRIG 98 01/30/2022    TRIG 108 06/28/2016       Lab Results   Component Value Date    ALT 20 04/25/2023    AST 18 04/25/2023    ALKPHOS 151 (H) 04/25/2023               Imaging: I have personally reviewed pertinent reports.       EKG: NSR, no acute ST changes

## 2024-01-14 NOTE — TELEPHONE ENCOUNTER
Talked with patient over phone at [phone] concerning the message from GI and the antiplatelet medications she's on (After discharge on 1/30/2022, patient was to be on brilinta 90mg BID but it seems according to GI, patient is on plavix?)  Patient states daughter helps with outpatient followup and stated to talk to her daughter instead  Called daughter Ilan Carney [phone]  Patient actually not on plavix but seems to be on Brilinta according to daughter  Patient has had no new stroke symptoms since the January hospitalization and has been stable since on Brilinta  Reiterated need for neurology appointment  Will arrange appointment Neurology (with me) if possible  [facility] neurology phone number at [phone] to daughter for further contact  Unable to Assess

## 2024-03-11 ENCOUNTER — TELEPHONE (OUTPATIENT)
Dept: NEUROLOGY | Facility: CLINIC | Age: 74
End: 2024-03-11

## 2024-03-11 NOTE — TELEPHONE ENCOUNTER
Spoke with pt daughter to let her know that provider will not be in office today and she would have to r/s her moms appointment. Her new appointment date is 3/12/24 @2PM.

## 2024-03-12 ENCOUNTER — OFFICE VISIT (OUTPATIENT)
Dept: NEUROLOGY | Facility: CLINIC | Age: 74
End: 2024-03-12
Payer: MEDICARE

## 2024-03-12 VITALS
OXYGEN SATURATION: 98 % | WEIGHT: 193.8 LBS | TEMPERATURE: 96.5 F | HEART RATE: 102 BPM | SYSTOLIC BLOOD PRESSURE: 112 MMHG | BODY MASS INDEX: 36.62 KG/M2 | DIASTOLIC BLOOD PRESSURE: 70 MMHG

## 2024-03-12 DIAGNOSIS — I69.354 HEMIPLEGIA AND HEMIPARESIS FOLLOWING CEREBRAL INFARCTION AFFECTING LEFT NON-DOMINANT SIDE (HCC): ICD-10-CM

## 2024-03-12 DIAGNOSIS — I10 PRIMARY HYPERTENSION: Primary | ICD-10-CM

## 2024-03-12 DIAGNOSIS — E11.9 DIABETES MELLITUS TYPE 2, NONINSULIN DEPENDENT (HCC): ICD-10-CM

## 2024-03-12 DIAGNOSIS — I65.23 MILD ATHEROSCLEROSIS OF CAROTID ARTERY, BILATERAL: ICD-10-CM

## 2024-03-12 DIAGNOSIS — Z86.73 HISTORY OF CVA (CEREBROVASCULAR ACCIDENT): ICD-10-CM

## 2024-03-12 DIAGNOSIS — G47.33 OSA (OBSTRUCTIVE SLEEP APNEA): ICD-10-CM

## 2024-03-12 PROCEDURE — 99214 OFFICE O/P EST MOD 30 MIN: CPT

## 2024-03-12 NOTE — PROGRESS NOTES
Patient ID: Lydia Cast is a 73 y.o. female who presents to the [facility].    Assessment/Plan:    History of CVA:    - VAS carotid artery ultrasound ordered for mild atherosclerotic disease of the bilateral carotid arteries.  - Ordered diagnostic sleep study to evaluate for potential obstructive sleep apnea at this time.  - Would recommend that the patient look more into healthier eating and paying attention to what foods she can and cannot have at this time.  Ideally, would recommend a low salt, low fat, and low processed food diet.  Trying to implement as many fruits and vegetables as she possibly can.  Trying to stay away from red meat on a consistent basis which contains a significant amount of fat, and also looking into more leaner proteins such as poultry or fish.  Recommending that the patient get outside and be more active, start walking as much as she possibly can.    - For ongoing stroke prevention continue: Aspirin 81 mg daily, Brilinta 90 mg twice daily, atorvastatin 40 mg daily  - Discussed the importance of antiplatelet management with the patient to prevent future strokes.   - Recommend to check blood pressure occasionally away from the doctor's office to make sure that those numbers are typically less than 130/80.  If they are frequently higher than that, we recommend checking a little more often and to follow up with primary care team   - Will defer to primary care team for monitoring of cholesterol panel and blood sugar numbers with target LDL cholesterol of less than 70 and hemoglobin A1c less than 7%  - Recommend following a low salt, mediterranean diet   - Recommend routine physical exercise as tolerated     We will plan for her to return to the office in 1 year time to see on of the APPs or Dr. Calix but would be happy to see her sooner if the need should arise.  If she has any symptoms concerning for TIA or stroke including sudden painless loss of vision or double  vision, difficulty speaking or swallowing, vertigo/room spinning that does not quickly resolve, or weakness/numbness/loss of coordination affecting 1 side of the face or body she should proceed by ambulance to the nearest emergency room immediately.     Subjective:    HPI      For Review:    The patient was last seen in the office on 09/08/2023 by myself.  At the last visit the patient was presenting as an office visit follow-up appointment in regard to her history of CVA and also due to history of TIA.  Stated that the patient was not acknowledging any new strokelike symptoms at this time.  Is noted that the patient was still having the same persistent dizziness and balance issues that she was having for quite some time.  It was noted that the patient was seeing a cardiologist for more syncopal/blacking out episodes.  She had completed a Zio patch check come back relatively unrevealing except for a few runs of atrial tachycardia and is still currently following with her cardiologist.  Is noted that the patient was still having residual weakness of the left upper and lower extremity and left upper and lower extremity numbness.  It was noted that the patient stopped participating in physical therapy due to the dizziness she was experiencing.  Is noted that the patient did not want to further pursue any study for potential obstructive sleep apnea.  I had suggested that it may be a good idea for the patient to complete this as it could be a secondary stroke risk factor.  I did also encourage the patient to eat a more healthy well-balanced diet and also work on trying to stay active as much as possible.  Encouraged the patient to check her blood pressure at home, often targeting numbers of less than 130/80.  Recommended she still continue to follow with her cardiologist in regard to her presyncopal/syncopal episodes.  Encouraged the patient that if she is feeling dizzy or lightheaded she could use her meclizine as  needed.  She was still continuing on aspirin, Brilinta, and Lipitor for secondary stroke prevention recommended that she continue as such.    Interval History:      New stroke symptoms/residual symptoms:    Any new, sudden onset weakness, numbness, facial droop, slurred speech, difficulty speaking, trouble swallowing, persistent vertigo, or sudden double vision or vision loss? No new stroke-like symptoms     Residual symptoms include: weakness of the left UE/LE: upper extremity and lower extremity and other: numbness of the left upper and lower extremity     Stroke Etiology and Risk Factor modification:    Stroke risk factors were evaluated including: Hypertension, hyperlipidemia, diabetes mellitus, obesity, possible obstructive sleep apnea     AP/AC therapy: aspirin 81 mg once daily and Brillinta 90 mg twice daily at this time. No bleeding or bruising issues      Statin therapy: Lipitor 40 mg once daily     Blood pressure today and as of late: 112/70 BP today in the office.     Most recent LDL: 39 mg/dL in 06/2023    Most recent hemoglobin A1C: 6.2% in 06/2023    Cardiology evaluation? Any cardiac monitoring required?: Has not bee seeing the cardiologist recently. Cardiology could not find any cause of the syncopal episodes. Has not really been passing out but feeling more dizzy. When she is moving around it is more than when she is sitting down.     Endocrinology evaluation? Following proper glycemic treatment/diet? None     Lifestyle history/modifications:    Diet/Exercise regimen: Has not been active and has not been walking consistently. Her family tries to tell her to start walking and move around but she doesn't. Diet has not been preferable either.     Any physical therapy, occupational therapy or speech therapy performed/required at this time?: She does need balance therapy and walking therapy but not currently in now.     Any difficulty with sleep? No issues with falling asleep and staying asleep.     Any  history of sleep apnea? CPAP compliance?: Does not want sleep study test, patient's daughter thinks she will comply.     Post-stroke depression/anxiety? Patient's daughter states the patient says she takes Prozac. Not in her medication list.     Any history of smoking? Does not smoke       Lab Results   Component Value Date/Time    CHOLESTEROL 98 04/28/2022 09:16 AM     Lab Results   Component Value Date/Time    TRIG 135 04/28/2022 09:16 AM    TRIG 209 06/12/2015 05:27 AM     Lab Results   Component Value Date/Time    HDL 42 (L) 04/28/2022 09:16 AM    HDL 36 06/12/2015 05:27 AM     Lab Results   Component Value Date/Time    LDLCALC 29 04/28/2022 09:16 AM    LDLCALC 100 06/12/2015 05:27 AM       Lab Results   Component Value Date/Time    HGBA1C 6.2 (H) 06/15/2023 09:28 AM     Lab Results   Component Value Date/Time     06/15/2023 09:28 AM           Past Medical History:   Diagnosis Date    Diabetes mellitus (HCC)     Hypertension     Stroke (HCC)        Current Outpatient Medications:     albuterol (PROVENTIL HFA,VENTOLIN HFA) 90 mcg/act inhaler, Inhale 2 puffs 2 (two) times a day, Disp: , Rfl:     aspirin 81 mg chewable tablet, TAKE 1 TABLET (81 MG TOTAL) DAILY, Disp: 90 tablet, Rfl: 0    atorvastatin (LIPITOR) 40 mg tablet, Take 40 mg by mouth, Disp: , Rfl:     Blood Glucose Monitoring Suppl (ACCU-CHEK YOUSUF CONNECT) w/Device KIT, Use 1 Device daily, Disp: , Rfl:     Breo Ellipta 200-25 MCG/ACT inhaler, , Disp: , Rfl:     glucose blood test strip, 1 strip by In Vitro route daily, Disp: , Rfl:     Lancets MISC, 1 each by Does not apply route daily, Disp: , Rfl:     lisinopril (ZESTRIL) 10 mg tablet, Take 10 mg by mouth, Disp: , Rfl:     meclizine (ANTIVERT) 25 mg tablet, Take 1 tablet (25 mg total) by mouth every 8 (eight) hours, Disp: 90 tablet, Rfl: 3    ticagrelor (BRILINTA) 90 MG, Take 1 tablet (90 mg total) by mouth every 12 (twelve) hours, Disp: 180 tablet, Rfl: 0     Objective:    Physical Exam:                                                                  Vitals:            Constitutional:    There were no vitals taken for this visit.  BP Readings from Last 3 Encounters:   11/07/23 114/68   09/08/23 130/72   07/31/23 122/70     Pulse Readings from Last 3 Encounters:   11/07/23 82   09/08/23 (!) 119   07/31/23 83         Well developed, well nourished, well groomed. No dysmorphic features.       Psychiatric:  Normal behavior and appropriate affect        Neurological Examination:     Mental status/cognitive function:   Orientated to time, place and person. Recent and remote memory intact. Attention span and concentration as well as fund of knowledge are appropriate for age. Normal language and spontaneous speech.     Cranial Nerves:  II-visual fields full.   III, IV, VI-Pupils were equal, round, and reactive to light and accomodation. Extraocular movements were full and conjugate without nystagmus. Conjugate gaze, normal smooth pursuits, normal saccades   V-facial sensation asymmetric, decreased sensation on the left side of the face compared to the right side.  VII-facial expression symmetric, intact forehead wrinkle, strong eye closure, symmetric smile    VIII-hearing grossly intact bilaterally   IX, X-palate elevation symmetric, no dysarthria.   XI-shoulder shrug strength intact    XII-tongue protrusion midline.    Motor Exam: symmetric bulk and tone throughout, no pronator drift. Power/strength 5/5 right upper and lower extremities, 4/5 of the left upper extremity and left lower extremity, decreased  strength of the left hand compared to the right. No atrophy, fasciculations or abnormal movements noted.   Sensory:  Diminished light touch sensation of the left upper and lower extremity compared to the right upper and lower extremity.   Reflexes: brachioradialis 2+, biceps 2+, knee 2+, bilaterally  Coordination: Finger nose finger intact bilaterally, no apparent dysmetria, ataxia or tremor noted  Gait:  Unsteady gait, uses cane to ambulate and leans to the right side.      ROS:    Review of Systems   Constitutional:  Negative for appetite change, fatigue and fever.   HENT: Negative.  Negative for hearing loss, tinnitus, trouble swallowing and voice change.    Eyes: Negative.  Negative for photophobia, pain and visual disturbance.   Respiratory: Negative.  Negative for shortness of breath.    Cardiovascular: Negative.  Negative for palpitations.   Gastrointestinal: Negative.  Negative for nausea and vomiting.   Endocrine: Negative.  Negative for cold intolerance.   Genitourinary: Negative.  Negative for dysuria, frequency and urgency.   Musculoskeletal:  Negative for back pain, gait problem, myalgias, neck pain and neck stiffness.   Skin: Negative.  Negative for rash.   Allergic/Immunologic: Negative.    Neurological:  Positive for weakness and numbness. Negative for dizziness, tremors, seizures, syncope, facial asymmetry, speech difficulty, light-headedness and headaches.   Hematological: Negative.  Does not bruise/bleed easily.   Psychiatric/Behavioral: Negative.  Negative for confusion, hallucinations and sleep disturbance.    All other systems reviewed and are negative.      I have spent 30 minutes today on this case including chart review, performing history and exam, patient counseling, and documentation/communication      Kristian Freeman PA-C  3/12/2024 2:02 PM

## 2024-03-12 NOTE — PROGRESS NOTES
Review of Systems   Constitutional:  Negative for appetite change, fatigue and fever.   HENT: Negative.  Negative for hearing loss, tinnitus, trouble swallowing and voice change.    Eyes: Negative.  Negative for photophobia, pain and visual disturbance.   Respiratory: Negative.  Negative for shortness of breath.    Cardiovascular: Negative.  Negative for palpitations.   Gastrointestinal: Negative.  Negative for nausea and vomiting.   Endocrine: Negative.  Negative for cold intolerance.   Genitourinary: Negative.  Negative for dysuria, frequency and urgency.   Musculoskeletal:  Negative for back pain, gait problem, myalgias, neck pain and neck stiffness.   Skin: Negative.  Negative for rash.   Allergic/Immunologic: Negative.    Neurological:  Positive for weakness and numbness. Negative for dizziness, tremors, seizures, syncope, facial asymmetry, speech difficulty, light-headedness and headaches.   Hematological: Negative.  Does not bruise/bleed easily.   Psychiatric/Behavioral: Negative.  Negative for confusion, hallucinations and sleep disturbance.    All other systems reviewed and are negative.

## 2024-03-12 NOTE — PATIENT INSTRUCTIONS
History of CVA:    - VAS carotid artery ultrasound ordered for mild atherosclerotic disease of the bilateral carotid arteries.  - Ordered diagnostic sleep study to evaluate for potential obstructive sleep apnea at this time.  - Would recommend that the patient look more into healthier eating and paying attention to what foods she can and cannot have at this time.  Ideally, would recommend a low salt, low fat, and low processed food diet.  Trying to implement as many fruits and vegetables as she possibly can.  Trying to stay away from red meat on a consistent basis which contains a significant amount of fat, and also looking into more leaner proteins such as poultry or fish.  Recommending that the patient get outside and be more active, start walking as much as she possibly can.    - For ongoing stroke prevention continue: Aspirin 81 mg daily, Brilinta 90 mg twice daily, atorvastatin 40 mg daily  - Discussed the importance of antiplatelet management with the patient to prevent future strokes.   - Recommend to check blood pressure occasionally away from the doctor's office to make sure that those numbers are typically less than 130/80.  If they are frequently higher than that, we recommend checking a little more often and to follow up with primary care team   - Will defer to primary care team for monitoring of cholesterol panel and blood sugar numbers with target LDL cholesterol of less than 70 and hemoglobin A1c less than 7%  - Recommend following a low salt, mediterranean diet   - Recommend routine physical exercise as tolerated     We will plan for her to return to the office in 1 year time to see on of the APPs or Dr. Calix but would be happy to see her sooner if the need should arise.  If she has any symptoms concerning for TIA or stroke including sudden painless loss of vision or double vision, difficulty speaking or swallowing, vertigo/room spinning that does not quickly resolve, or weakness/numbness/loss of  coordination affecting 1 side of the face or body she should proceed by ambulance to the nearest emergency room immediately.

## 2024-03-13 DIAGNOSIS — E66.9 CLASS 2 OBESITY WITH BODY MASS INDEX (BMI) OF 36.0 TO 36.9 IN ADULT, UNSPECIFIED OBESITY TYPE, UNSPECIFIED WHETHER SERIOUS COMORBIDITY PRESENT: ICD-10-CM

## 2024-03-13 DIAGNOSIS — R06.83 SNORING: ICD-10-CM

## 2024-03-13 DIAGNOSIS — G47.33 OSA (OBSTRUCTIVE SLEEP APNEA): Primary | ICD-10-CM

## 2024-03-13 DIAGNOSIS — Z86.73 HISTORY OF CVA (CEREBROVASCULAR ACCIDENT): ICD-10-CM

## 2024-04-11 ENCOUNTER — HOSPITAL ENCOUNTER (OUTPATIENT)
Dept: NON INVASIVE DIAGNOSTICS | Facility: HOSPITAL | Age: 74
Discharge: HOME | End: 2024-04-11
Payer: COMMERCIAL

## 2024-04-11 DIAGNOSIS — Z86.73 HISTORY OF CVA (CEREBROVASCULAR ACCIDENT): ICD-10-CM

## 2024-04-11 DIAGNOSIS — I65.23 MILD ATHEROSCLEROSIS OF CAROTID ARTERY, BILATERAL: ICD-10-CM

## 2024-04-11 PROCEDURE — 93880 EXTRACRANIAL BILAT STUDY: CPT | Performed by: SURGERY

## 2024-04-11 PROCEDURE — 93880 EXTRACRANIAL BILAT STUDY: CPT

## 2025-03-17 ENCOUNTER — OFFICE VISIT (OUTPATIENT)
Dept: NEUROLOGY | Facility: CLINIC | Age: 75
End: 2025-03-17
Payer: COMMERCIAL

## 2025-03-17 VITALS
BODY MASS INDEX: 35.98 KG/M2 | SYSTOLIC BLOOD PRESSURE: 134 MMHG | WEIGHT: 190.4 LBS | DIASTOLIC BLOOD PRESSURE: 60 MMHG | HEART RATE: 82 BPM

## 2025-03-17 DIAGNOSIS — Z86.73 HISTORY OF CVA (CEREBROVASCULAR ACCIDENT): ICD-10-CM

## 2025-03-17 DIAGNOSIS — I10 PRIMARY HYPERTENSION: Primary | ICD-10-CM

## 2025-03-17 DIAGNOSIS — E78.5 DYSLIPIDEMIA: ICD-10-CM

## 2025-03-17 DIAGNOSIS — E11.9 DIABETES MELLITUS TYPE 2, NONINSULIN DEPENDENT (HCC): ICD-10-CM

## 2025-03-17 DIAGNOSIS — G47.33 OSA (OBSTRUCTIVE SLEEP APNEA): ICD-10-CM

## 2025-03-17 DIAGNOSIS — I65.23 MILD ATHEROSCLEROSIS OF CAROTID ARTERY, BILATERAL: ICD-10-CM

## 2025-03-17 PROCEDURE — 99213 OFFICE O/P EST LOW 20 MIN: CPT

## 2025-03-17 NOTE — PROGRESS NOTES
Name: Lydia Cast      : 1950      MRN: [id number]  Encounter Provider: Kristian Freeman PA-C  Encounter Date: 3/17/2025   Encounter department: [facility] [place]  :  Assessment & Plan  History of CVA (cerebrovascular accident)  - Recommend at this time that the patient can repeat carotid artery ultrasound in about another year.  Last carotid artery ultrasound showed less than 50% stenosis of the bilateral internal carotid arteries.  No previous study available for comparison.  She can have this acquired in 2026.  - Ordered repeat diagnostic sleep study for the patient to complete to be evaluated for SIENA.  Patient was not able to attend previous appointments to be evaluated for sleep apnea.  Patient would be willing to go to the appointments to be evaluated for this in the future.  - Advised that if the patient were willing and open she could certainly consider vestibular therapy for some of the vertigo and dizziness she is experiencing.  Although, patient's daughter notes that the patient is likely not going to participate in any physical therapy.  Advised that certainly we could consider this as a possibility in the future or the patient can follow-up with cardiology or primary care to see ENT for the vertigo as well.  - Complete lipid panel and hemoglobin A1c ordered to have the patient obtain updated blood work and see if there is any significant progression of dyslipidemia or diabetes mellitus at this time    - For ongoing stroke prevention continue: Aspirin 81 mg daily, Brilinta 90 mg twice daily, atorvastatin 40 mg once daily  - Discussed the importance of antiplatelet/anticoagulation management with the patient to prevent future strokes.   - Recommend to check blood pressure occasionally away from the doctor's office to make sure that those numbers are typically less than 130/80.  If they are frequently higher than that, we recommend checking a little more  often and to follow up with primary care team   - Will defer to primary care team for monitoring of cholesterol panel and blood sugar numbers with target LDL cholesterol of less than 70 and hemoglobin A1c less than 7%  - Recommend following a low salt, mediterranean diet   - Recommend routine physical exercise as tolerated   Orders:    Ambulatory Referral to Sleep Medicine; Future    Lipid Panel with Direct LDL reflex; Future    Hemoglobin A1C; Future    Primary hypertension         Diabetes mellitus type 2, noninsulin dependent (HCC)    Lab Results   Component Value Date    HGBA1C 6.2 (H) 06/15/2023       Orders:    Hemoglobin A1C; Future    SIENA (obstructive sleep apnea)    Orders:    Ambulatory Referral to Sleep Medicine; Future    Mild atherosclerosis of carotid artery, bilateral         Dyslipidemia    Orders:    Lipid Panel with Direct LDL reflex; Future      Patient Instructions   - Recommend at this time that the patient can repeat carotid artery ultrasound in about another year.  Last carotid artery ultrasound showed less than 50% stenosis of the bilateral internal carotid arteries.  No previous study available for comparison.  She can have this acquired in April 2026.  - Ordered repeat diagnostic sleep study for the patient to complete to be evaluated for SIENA.  Patient was not able to attend previous appointments to be evaluated for sleep apnea.  Patient would be willing to go to the appointments to be evaluated for this in the future.  - Advised that if the patient were willing and open she could certainly consider vestibular therapy for some of the vertigo and dizziness she is experiencing.  Although, patient's daughter notes that the patient is likely not going to participate in any physical therapy.  Advised that certainly we could consider this as a possibility in the future or the patient can follow-up with cardiology or primary care to see ENT for the vertigo as well.  - Complete lipid panel and  hemoglobin A1c ordered to have the patient obtain updated blood work and see if there is any significant progression of dyslipidemia or diabetes mellitus at this time    - For ongoing stroke prevention continue: Aspirin 81 mg daily, Brilinta 90 mg twice daily, atorvastatin 40 mg once daily  - Discussed the importance of antiplatelet/anticoagulation management with the patient to prevent future strokes.   - Recommend to check blood pressure occasionally away from the doctor's office to make sure that those numbers are typically less than 130/80.  If they are frequently higher than that, we recommend checking a little more often and to follow up with primary care team   - Will defer to primary care team for monitoring of cholesterol panel and blood sugar numbers with target LDL cholesterol of less than 70 and hemoglobin A1c less than 7%  - Recommend following a low salt, mediterranean diet   - Recommend routine physical exercise as tolerated     We will plan for her to return to the office in 1 year time to see on of the APPs or Dr. Calix but would be happy to see her sooner if the need should arise.  If she has any symptoms concerning for TIA or stroke including sudden painless loss of vision or double vision, difficulty speaking or swallowing, vertigo/room spinning that does not quickly resolve, or weakness/numbness/loss of coordination affecting 1 side of the face or body she should proceed by ambulance to the nearest emergency room immediately.      History of Present Illness   HPI     For Review:    The patient was last seen on 03/12/2024 by myself.  At the time of the last appointment the patient was presenting for an office visit follow-up appointment in regard to her history of stroke.  At the time of the last appointment patient was not having any new strokelike symptoms.  Still having residual deficits of upper extremity and lower extremity weakness and numbness.  She was still taking aspirin 81 mg daily  and Brilinta 90 mg twice daily for secondary stroke prevention.  She was also still continuing to take atorvastatin 40 mg daily for secondary stroke prevention.  Recommended at the last visit that the patient complete carotid artery ultrasound for further evaluation of potential carotid artery stenosis.  Order diagnostic sleep study for further evaluation of SIENA.  Recommended that the patient look into more healthier eating habits and being attention to what she is eating.  Recommend a low-salt, low-fat and low processed food diet trying to implement fruits and vegetables into her diet as well.  The patient was doing relatively well controlling other vascular risk factors like her blood pressure, cholesterol and hemoglobin A1c as well.    Interval History:    New stroke symptoms/residual symptoms:    Any new, sudden onset weakness, numbness, facial droop, slurred speech, difficulty speaking, trouble swallowing, persistent vertigo, or sudden double vision or vision loss? No new stroke-like symptoms    Residual symptoms include: weakness of the left UE/LE: upper extremity and lower extremity    Stroke Etiology and Risk Factor modification:    Stroke risk factors were evaluated including:  Hypertension, hyperlipidemia, diabetes mellitus, obesity, possible obstructive sleep apnea     AP/AC therapy: aspirin 81 mg once daily and Brilinta 90 mg twice daily. No bleeding or bruising issues.     Statin therapy: atorvastatin 40 mg once daily     Blood pressure today and as of late: 134/60 BP today in the office, just taking lisinopril 10 mg daily for BP control     Most recent LDL: 39 mg/dL    Most recent hemoglobin A1C: 6.5%    Cardiology evaluation? Any cardiac monitoring required?: Has been awhile since following up with cardiology     Endocrinology evaluation? Following proper glycemic treatment/diet?: None    Vascular surgery evaluation? Monitoring of carotid artery ultrasound required? VAS carotid artery ultrasound April  of , no significant stenosis noted.     Lifestyle history/modifications:    Diet/Exercise regimen: Has not been eating relatively healthy, has been eating junk food throughout the day. No walking outside of her house.     Any physical therapy, occupational therapy or speech therapy performed/required at this time?: No PT/OT. She did fall a few times since last time.     Any difficulty with sleeping? Any history of sleep apnea? CPAP compliance?: Did not acquire sleep study since last visit.     Post-stroke depression/anxiety? Not currently taking any medication, has anxiety with going to appointments    Any history of smoking or tobacco usage?: No smoking history     Additional History:    Has been experiencing some dizziness/vertigo.  Does have past history of having vertigo.  Recommend looking into vestibular therapy, can potentially consult with primary care provider if she would like to participate in therapy as well.    Review of Systems I have personally reviewed the MA's review of systems and made changes as necessary.    Medical History Reviewed by provider this encounter:     .  Past Medical History   Past Medical History:   Diagnosis Date    Diabetes mellitus (HCC)     Hypertension     Stroke (HCC)      Past Surgical History:   Procedure Laterality Date     SECTION      x2     Family History   Problem Relation Age of Onset    Stroke Father       reports that she has never smoked. She has never been exposed to tobacco smoke. She has never used smokeless tobacco. She reports that she does not drink alcohol and does not use drugs.  Current Outpatient Medications   Medication Instructions    albuterol (PROVENTIL HFA,VENTOLIN HFA) 90 mcg/act inhaler 2 puffs, 2 times daily    aspirin 81 mg chewable tablet TAKE 1 TABLET (81 MG TOTAL) DAILY    atorvastatin (LIPITOR) 40 mg    Blood Glucose Monitoring Suppl (ACCU-CHEK YOUSUF CONNECT) w/Device KIT 1 Device, Daily    Breo Ellipta 200-25 MCG/ACT inhaler No  dose, route, or frequency recorded.    glucose blood test strip 1 strip, Daily    Lancets MISC 1 each, Daily    lisinopril (ZESTRIL) 10 mg    meclizine (ANTIVERT) 25 mg, Oral, Every 8 hours scheduled    ticagrelor (BRILINTA) 90 mg, Oral, Every 12 hours scheduled   No Known Allergies   Current Outpatient Medications on File Prior to Visit   Medication Sig Dispense Refill    albuterol (PROVENTIL HFA,VENTOLIN HFA) 90 mcg/act inhaler Inhale 2 puffs 2 (two) times a day      aspirin 81 mg chewable tablet TAKE 1 TABLET (81 MG TOTAL) DAILY 90 tablet 0    atorvastatin (LIPITOR) 40 mg tablet Take 40 mg by mouth      Blood Glucose Monitoring Suppl (ACCU-CHEK YOUSUF CONNECT) w/Device KIT Use 1 Device daily      Breo Ellipta 200-25 MCG/ACT inhaler       glucose blood test strip 1 strip by In Vitro route daily      Lancets MISC 1 each by Does not apply route daily      lisinopril (ZESTRIL) 10 mg tablet Take 10 mg by mouth      meclizine (ANTIVERT) 25 mg tablet Take 1 tablet (25 mg total) by mouth every 8 (eight) hours 90 tablet 3    ticagrelor (BRILINTA) 90 MG Take 1 tablet (90 mg total) by mouth every 12 (twelve) hours 180 tablet 0     No current facility-administered medications on file prior to visit.      Social History     Tobacco Use    Smoking status: Never     Passive exposure: Never    Smokeless tobacco: Never   Vaping Use    Vaping status: Never Used   Substance and Sexual Activity    Alcohol use: No    Drug use: No    Sexual activity: Not on file        Objective   /60 (BP Location: Left arm, Patient Position: Sitting, Cuff Size: Large)   Pulse 82   Wt 86.4 kg (190 lb 6.4 oz)   BMI 35.98 kg/m²     Physical Exam  Neurological Exam    Physical Exam:                                                                 Vitals:            Constitutional:    /60 (BP Location: Left arm, Patient Position: Sitting, Cuff Size: Large)   Pulse 82   Wt 86.4 kg (190 lb 6.4 oz)   BMI 35.98 kg/m²   BP Readings from Last 3  Encounters:   03/17/25 134/60   03/12/24 112/70   11/07/23 114/68     Pulse Readings from Last 3 Encounters:   03/17/25 82   03/12/24 102   11/07/23 82         Well developed, well nourished, well groomed. No dysmorphic features.       Psychiatric:  Normal behavior and appropriate affect        Neurological Examination:     Mental status/cognitive function:   Orientated to time, place and person. Recent and remote memory intact. Attention span and concentration as well as fund of knowledge are appropriate for age. Normal language and spontaneous speech.    Cranial Nerves:  II-visual fields full.   Fundi poorly visualized due to pupillary constriction  III, IV, VI-Pupils were equal, round, and reactive to light and accomodation. Extraocular movements were full and conjugate without nystagmus. Conjugate gaze, normal smooth pursuits, normal saccades   V-facial sensation symmetric.    VII-facial expression symmetric, intact forehead wrinkle, strong eye closure, symmetric smile    VIII-hearing grossly intact bilaterally   IX, X-palate elevation symmetric, no dysarthria.   XI-shoulder shrug strength intact    XII-tongue protrusion midline.    Motor Exam: symmetric bulk and tone throughout, no pronator drift. Power/strength 5/5 right upper and lower extremities, 4/5 of the left upper extremity and left lower extremity, decreased  strength of the left hand compared to the right. No atrophy, fasciculations or abnormal movements noted.   Sensory:  Diminished light touch sensation of the left upper and lower extremity compared to the right upper and lower extremity.   Reflexes: brachioradialis 2+, biceps 2+, knee 2+, bilaterally  Coordination: Finger nose finger intact bilaterally, no apparent dysmetria, ataxia or tremor noted  Gait: Unsteady gait, uses cane to ambulate    Radiology Results Review: I have reviewed radiology reports from 04/11/2024 including: Ultrasound(s).    Administrative Statements   I have spent a  total time of 20 minutes in caring for this patient on the day of the visit/encounter including Diagnostic results, Risks and benefits of tx options, Instructions for management, Patient and family education, Importance of tx compliance, Risk factor reductions, Impressions, Counseling / Coordination of care, Documenting in the medical record, Reviewing/placing orders in the medical record (including tests, medications, and/or procedures), and Obtaining or reviewing history  .

## 2025-03-17 NOTE — PATIENT INSTRUCTIONS
- Recommend at this time that the patient can repeat carotid artery ultrasound in about another year.  Last carotid artery ultrasound showed less than 50% stenosis of the bilateral internal carotid arteries.  No previous study available for comparison.  She can have this acquired in April 2026.  - Ordered repeat diagnostic sleep study for the patient to complete to be evaluated for SIENA.  Patient was not able to attend previous appointments to be evaluated for sleep apnea.  Patient would be willing to go to the appointments to be evaluated for this in the future.  - Advised that if the patient were willing and open she could certainly consider vestibular therapy for some of the vertigo and dizziness she is experiencing.  Although, patient's daughter notes that the patient is likely not going to participate in any physical therapy.  Advised that certainly we could consider this as a possibility in the future or the patient can follow-up with cardiology or primary care to see ENT for the vertigo as well.  - Complete lipid panel and hemoglobin A1c ordered to have the patient obtain updated blood work and see if there is any significant progression of dyslipidemia or diabetes mellitus at this time    - For ongoing stroke prevention continue: Aspirin 81 mg daily, Brilinta 90 mg twice daily, atorvastatin 40 mg once daily  - Discussed the importance of antiplatelet/anticoagulation management with the patient to prevent future strokes.   - Recommend to check blood pressure occasionally away from the doctor's office to make sure that those numbers are typically less than 130/80.  If they are frequently higher than that, we recommend checking a little more often and to follow up with primary care team   - Will defer to primary care team for monitoring of cholesterol panel and blood sugar numbers with target LDL cholesterol of less than 70 and hemoglobin A1c less than 7%  - Recommend following a low salt, mediterranean diet   -  Recommend routine physical exercise as tolerated     We will plan for her to return to the office in 1 year time to see on of the APPs or Dr. Calix but would be happy to see her sooner if the need should arise.  If she has any symptoms concerning for TIA or stroke including sudden painless loss of vision or double vision, difficulty speaking or swallowing, vertigo/room spinning that does not quickly resolve, or weakness/numbness/loss of coordination affecting 1 side of the face or body she should proceed by ambulance to the nearest emergency room immediately.

## 2025-03-17 NOTE — ASSESSMENT & PLAN NOTE
- Recommend at this time that the patient can repeat carotid artery ultrasound in about another year.  Last carotid artery ultrasound showed less than 50% stenosis of the bilateral internal carotid arteries.  No previous study available for comparison.  She can have this acquired in April 2026.  - Ordered repeat diagnostic sleep study for the patient to complete to be evaluated for SIENA.  Patient was not able to attend previous appointments to be evaluated for sleep apnea.  Patient would be willing to go to the appointments to be evaluated for this in the future.  - Advised that if the patient were willing and open she could certainly consider vestibular therapy for some of the vertigo and dizziness she is experiencing.  Although, patient's daughter notes that the patient is likely not going to participate in any physical therapy.  Advised that certainly we could consider this as a possibility in the future or the patient can follow-up with cardiology or primary care to see ENT for the vertigo as well.  - Complete lipid panel and hemoglobin A1c ordered to have the patient obtain updated blood work and see if there is any significant progression of dyslipidemia or diabetes mellitus at this time    - For ongoing stroke prevention continue: Aspirin 81 mg daily, Brilinta 90 mg twice daily, atorvastatin 40 mg once daily  - Discussed the importance of antiplatelet/anticoagulation management with the patient to prevent future strokes.   - Recommend to check blood pressure occasionally away from the doctor's office to make sure that those numbers are typically less than 130/80.  If they are frequently higher than that, we recommend checking a little more often and to follow up with primary care team   - Will defer to primary care team for monitoring of cholesterol panel and blood sugar numbers with target LDL cholesterol of less than 70 and hemoglobin A1c less than 7%  - Recommend following a low salt, mediterranean diet   -  Recommend routine physical exercise as tolerated   Orders:    Ambulatory Referral to Sleep Medicine; Future    Lipid Panel with Direct LDL reflex; Future    Hemoglobin A1C; Future

## 2025-03-17 NOTE — PROGRESS NOTES
Review of Systems   Constitutional:  Negative for appetite change, fatigue and fever.   HENT: Negative.  Negative for hearing loss, tinnitus, trouble swallowing and voice change.    Eyes: Negative.  Negative for photophobia, pain and visual disturbance.   Respiratory: Negative.  Negative for shortness of breath.    Cardiovascular: Negative.  Negative for palpitations.   Gastrointestinal: Negative.  Negative for nausea and vomiting.   Endocrine: Negative.  Negative for cold intolerance.   Genitourinary: Negative.  Negative for dysuria, frequency and urgency.   Musculoskeletal:  Positive for gait problem (Shuffles Feet). Negative for back pain, myalgias, neck pain and neck stiffness.   Skin: Negative.  Negative for rash.   Allergic/Immunologic: Negative.    Neurological:  Positive for dizziness, weakness (Left Side, has stayed the same) and light-headedness. Negative for tremors, seizures, syncope, facial asymmetry, speech difficulty, numbness and headaches.   Hematological: Negative.  Does not bruise/bleed easily.   Psychiatric/Behavioral: Negative.  Negative for confusion, hallucinations and sleep disturbance.    All other systems reviewed and are negative.     How Severe Is Your Acne?: mild Is This A New Presentation, Or A Follow-Up?: Acne

## 2025-03-17 NOTE — ASSESSMENT & PLAN NOTE
Lab Results   Component Value Date    HGBA1C 6.2 (H) 06/15/2023       Orders:    Hemoglobin A1C; Future

## 2025-03-26 ENCOUNTER — APPOINTMENT (OUTPATIENT)
Dept: LAB | Facility: CLINIC | Age: 75
End: 2025-03-26
Payer: COMMERCIAL

## 2025-03-26 DIAGNOSIS — E11.9 DIABETES MELLITUS TYPE 2, NONINSULIN DEPENDENT (HCC): ICD-10-CM

## 2025-03-26 DIAGNOSIS — E78.5 DYSLIPIDEMIA: ICD-10-CM

## 2025-03-26 DIAGNOSIS — Z86.73 HISTORY OF CVA (CEREBROVASCULAR ACCIDENT): ICD-10-CM

## 2025-03-26 LAB
CHOLEST SERPL-MCNC: 97 MG/DL (ref ?–200)
EST. AVERAGE GLUCOSE BLD GHB EST-MCNC: 120 MG/DL
HBA1C MFR BLD: 5.8 %
HDLC SERPL-MCNC: 35 MG/DL
LDLC SERPL CALC-MCNC: 38 MG/DL (ref 0–100)
TRIGL SERPL-MCNC: 118 MG/DL (ref ?–150)

## 2025-03-26 PROCEDURE — 83036 HEMOGLOBIN GLYCOSYLATED A1C: CPT

## 2025-03-26 PROCEDURE — 36415 COLL VENOUS BLD VENIPUNCTURE: CPT

## 2025-03-26 PROCEDURE — 80061 LIPID PANEL: CPT

## 2025-03-27 ENCOUNTER — RESULTS FOLLOW-UP (OUTPATIENT)
Dept: NEUROLOGY | Facility: CLINIC | Age: 75
End: 2025-03-27

## 2025-04-15 ENCOUNTER — OFFICE VISIT (OUTPATIENT)
Dept: DENTISTRY | Facility: CLINIC | Age: 75
End: 2025-04-15

## 2025-04-15 DIAGNOSIS — K08.89 NON-RESTORABLE TOOTH: Primary | ICD-10-CM

## 2025-04-15 PROCEDURE — D0150 COMPREHENSIVE ORAL EVALUATION - NEW OR ESTABLISHED PATIENT: HCPCS

## 2025-04-15 PROCEDURE — D0330 PANORAMIC RADIOGRAPHIC IMAGE: HCPCS

## 2025-04-15 NOTE — PROGRESS NOTES
Procedure Details   - COMPREHENSIVE ORAL EVALUATION - NEW OR ESTABLISHED PATIENT     COMP EXAM, PANO   REVIEWED MED HX: meds, allergies, health changes reviewed in EPIC  CHIEF CONCERN:     -Last dental visit was more than 15 years ago  PAIN SCALE:  5  ASA CLASS:  ASA 3 - Patient with moderate systemic disease with functional limitations  PLAQUE:  heavy  CALCULUS: Heavy  BLEEDING:  light  STAIN :  Generalized  Heavy  PERIO: periodontally-hopeless prognosis of teeth    Visual and Tactile Intraoral/ Extraoral evaluation: Oral and Oropharyngeal cancer evaluation. No findings     Dr. Dalton Bowen -  Reviewed with patient clinical and radiographic findings and patient verbalized understanding. All questions and concerns addressed.     Pt had generalized pain/sensitivity in her mouth and is aware that her teeth are very mobile. Pt initially did not want to extract teeth. After explaining why her teeth are deemed non-restorable, pt was ok with proceeding.    Tx Plan:  - Full mouth EXTs w/ OMFS  - Upper/Lower Complete dentures      REFERRALS: OMFS referral provided for EXT #6, 7, 8, 9, 10, 11, 13, 20, 22, 23, 25, 26, 27, 28, and 29.        NEXT VISIT:   NV: preliminary impressions for upper/lower complete dentures 6 WEEKS AFTER HER OMFS EXTRACTIONS      Last Pano: 4/15/2025   - PANORAMIC RADIOGRAPHIC IMAGE

## 2025-04-15 NOTE — DENTAL PROCEDURE DETAILS
COMP EXAM, PANO   REVIEWED MED HX: meds, allergies, health changes reviewed in EPIC  CHIEF CONCERN:     -Last dental visit was more than 15 years ago  PAIN SCALE:  5  ASA CLASS:  ASA 3 - Patient with moderate systemic disease with functional limitations  PLAQUE:  heavy  CALCULUS: Heavy  BLEEDING:  light  STAIN :  Generalized  Heavy  PERIO: periodontally-hopeless prognosis of teeth    Visual and Tactile Intraoral/ Extraoral evaluation: Oral and Oropharyngeal cancer evaluation. No findings     Dr. Dalton Bowen -  Reviewed with patient clinical and radiographic findings and patient verbalized understanding. All questions and concerns addressed.     Pt had generalized pain/sensitivity in her mouth and is aware that her teeth are very mobile. Pt initially did not want to extract teeth. After explaining why her teeth are deemed non-restorable, pt was ok with proceeding.    Tx Plan:  - Full mouth EXTs w/ OMFS  - Upper/Lower Complete dentures      REFERRALS: OMFS referral provided for EXT #6, 7, 8, 9, 10, 11, 13, 20, 22, 23, 25, 26, 27, 28, and 29.        NEXT VISIT:   NV: preliminary impressions for upper/lower complete dentures 6 WEEKS AFTER HER OMFS EXTRACTIONS      Last Pano: 4/15/2025

## 2025-04-17 ENCOUNTER — TELEPHONE (OUTPATIENT)
Dept: DENTISTRY | Facility: CLINIC | Age: 75
End: 2025-04-17

## 2025-04-21 ENCOUNTER — TELEPHONE (OUTPATIENT)
Dept: SLEEP CENTER | Facility: CLINIC | Age: 75
End: 2025-04-21

## 2025-04-21 NOTE — TELEPHONE ENCOUNTER
Received referral for diagnostic sleep study but doesn't seem that not was ever completed. Will continue with referral process once note is finished. Reminder to discuss symptoms in note.    Ordering and co-signing providers notified.

## 2025-04-25 ENCOUNTER — TRANSCRIBE ORDERS (OUTPATIENT)
Dept: SLEEP CENTER | Facility: CLINIC | Age: 75
End: 2025-04-25

## 2025-04-25 DIAGNOSIS — Z86.73 HISTORY OF CVA (CEREBROVASCULAR ACCIDENT): Primary | ICD-10-CM

## 2025-04-25 DIAGNOSIS — G47.33 OSA (OBSTRUCTIVE SLEEP APNEA): ICD-10-CM
